# Patient Record
Sex: MALE | Race: WHITE | NOT HISPANIC OR LATINO | Employment: FULL TIME | URBAN - METROPOLITAN AREA
[De-identification: names, ages, dates, MRNs, and addresses within clinical notes are randomized per-mention and may not be internally consistent; named-entity substitution may affect disease eponyms.]

---

## 2017-01-23 ENCOUNTER — COMMUNICATION - HEALTHEAST (OUTPATIENT)
Dept: ENDOCRINOLOGY | Facility: CLINIC | Age: 37
End: 2017-01-23

## 2017-01-23 DIAGNOSIS — E11.9 DIABETES MELLITUS TYPE 2, UNCOMPLICATED (H): ICD-10-CM

## 2017-02-01 ENCOUNTER — AMBULATORY - HEALTHEAST (OUTPATIENT)
Dept: LAB | Facility: CLINIC | Age: 37
End: 2017-02-01

## 2017-02-01 DIAGNOSIS — E11.9 DIABETES MELLITUS TYPE 2, UNCOMPLICATED (H): ICD-10-CM

## 2017-02-01 LAB
CHOLEST SERPL-MCNC: 128 MG/DL
CREAT SERPL-MCNC: 0.7 MG/DL (ref 0.7–1.3)
FASTING STATUS PATIENT QL REPORTED: NORMAL
GFR SERPL CREATININE-BSD FRML MDRD: >60 ML/MIN/1.73M2
HBA1C MFR BLD: 8.4 % (ref 3.5–6)
HDLC SERPL-MCNC: 46 MG/DL
LDLC SERPL CALC-MCNC: 66 MG/DL
TRIGL SERPL-MCNC: 79 MG/DL

## 2017-02-09 ENCOUNTER — OFFICE VISIT - HEALTHEAST (OUTPATIENT)
Dept: ENDOCRINOLOGY | Facility: CLINIC | Age: 37
End: 2017-02-09

## 2017-02-09 DIAGNOSIS — E11.9 DIABETES MELLITUS TYPE 2, UNCOMPLICATED (H): ICD-10-CM

## 2017-02-10 ENCOUNTER — COMMUNICATION - HEALTHEAST (OUTPATIENT)
Dept: ENDOCRINOLOGY | Facility: CLINIC | Age: 37
End: 2017-02-10

## 2017-04-01 ENCOUNTER — OFFICE VISIT - HEALTHEAST (OUTPATIENT)
Dept: FAMILY MEDICINE | Facility: CLINIC | Age: 37
End: 2017-04-01

## 2017-04-01 DIAGNOSIS — H10.10 ALLERGIC CONJUNCTIVITIS: ICD-10-CM

## 2017-05-01 ENCOUNTER — COMMUNICATION - HEALTHEAST (OUTPATIENT)
Dept: ENDOCRINOLOGY | Facility: CLINIC | Age: 37
End: 2017-05-01

## 2017-05-01 DIAGNOSIS — E11.9 TYPE 2 DIABETES MELLITUS WITHOUT COMPLICATION (H): ICD-10-CM

## 2017-05-13 ENCOUNTER — OFFICE VISIT - HEALTHEAST (OUTPATIENT)
Dept: FAMILY MEDICINE | Facility: CLINIC | Age: 37
End: 2017-05-13

## 2017-05-13 ENCOUNTER — COMMUNICATION - HEALTHEAST (OUTPATIENT)
Dept: SCHEDULING | Facility: CLINIC | Age: 37
End: 2017-05-13

## 2017-05-13 ENCOUNTER — COMMUNICATION - HEALTHEAST (OUTPATIENT)
Dept: ENDOCRINOLOGY | Facility: CLINIC | Age: 37
End: 2017-05-13

## 2017-05-13 DIAGNOSIS — E11.9 DIABETES MELLITUS TYPE 2, UNCOMPLICATED (H): ICD-10-CM

## 2017-05-13 ASSESSMENT — MIFFLIN-ST. JEOR: SCORE: 2027.1

## 2017-05-31 ENCOUNTER — COMMUNICATION - HEALTHEAST (OUTPATIENT)
Dept: ENDOCRINOLOGY | Facility: CLINIC | Age: 37
End: 2017-05-31

## 2017-05-31 DIAGNOSIS — E11.9 DIABETES MELLITUS TYPE 2, UNCOMPLICATED (H): ICD-10-CM

## 2017-07-31 ENCOUNTER — COMMUNICATION - HEALTHEAST (OUTPATIENT)
Dept: ENDOCRINOLOGY | Facility: CLINIC | Age: 37
End: 2017-07-31

## 2017-07-31 DIAGNOSIS — E11.9 DIABETES MELLITUS TYPE 2, UNCOMPLICATED (H): ICD-10-CM

## 2017-08-01 ENCOUNTER — COMMUNICATION - HEALTHEAST (OUTPATIENT)
Dept: ENDOCRINOLOGY | Facility: CLINIC | Age: 37
End: 2017-08-01

## 2017-08-01 ENCOUNTER — AMBULATORY - HEALTHEAST (OUTPATIENT)
Dept: ENDOCRINOLOGY | Facility: CLINIC | Age: 37
End: 2017-08-01

## 2017-08-01 ENCOUNTER — AMBULATORY - HEALTHEAST (OUTPATIENT)
Dept: LAB | Facility: CLINIC | Age: 37
End: 2017-08-01

## 2017-08-01 DIAGNOSIS — E11.9 DIABETES MELLITUS TYPE 2, UNCOMPLICATED (H): ICD-10-CM

## 2017-08-01 LAB
CREAT SERPL-MCNC: 0.8 MG/DL (ref 0.7–1.3)
GFR SERPL CREATININE-BSD FRML MDRD: >60 ML/MIN/1.73M2
HBA1C MFR BLD: 8.1 % (ref 3.5–6)

## 2017-08-08 ENCOUNTER — OFFICE VISIT - HEALTHEAST (OUTPATIENT)
Dept: ENDOCRINOLOGY | Facility: CLINIC | Age: 37
End: 2017-08-08

## 2017-08-08 DIAGNOSIS — E11.9 DIABETES MELLITUS TYPE 2, UNCOMPLICATED (H): ICD-10-CM

## 2017-08-08 DIAGNOSIS — E11.9 TYPE 2 DIABETES MELLITUS WITHOUT COMPLICATION (H): ICD-10-CM

## 2017-08-09 ENCOUNTER — COMMUNICATION - HEALTHEAST (OUTPATIENT)
Dept: ENDOCRINOLOGY | Facility: CLINIC | Age: 37
End: 2017-08-09

## 2017-08-09 DIAGNOSIS — E11.9 DIABETES MELLITUS TYPE 2, UNCOMPLICATED (H): ICD-10-CM

## 2017-08-14 ENCOUNTER — COMMUNICATION - HEALTHEAST (OUTPATIENT)
Dept: ENDOCRINOLOGY | Facility: CLINIC | Age: 37
End: 2017-08-14

## 2017-08-14 DIAGNOSIS — E11.9 DIABETES (H): ICD-10-CM

## 2017-08-15 ENCOUNTER — COMMUNICATION - HEALTHEAST (OUTPATIENT)
Dept: ENDOCRINOLOGY | Facility: CLINIC | Age: 37
End: 2017-08-15

## 2017-09-19 ENCOUNTER — COMMUNICATION - HEALTHEAST (OUTPATIENT)
Dept: ENDOCRINOLOGY | Facility: CLINIC | Age: 37
End: 2017-09-19

## 2017-09-21 ENCOUNTER — AMBULATORY - HEALTHEAST (OUTPATIENT)
Dept: ENDOCRINOLOGY | Facility: CLINIC | Age: 37
End: 2017-09-21

## 2017-09-21 DIAGNOSIS — E11.9 DIABETES MELLITUS TYPE 2, UNCOMPLICATED (H): ICD-10-CM

## 2017-09-25 ENCOUNTER — COMMUNICATION - HEALTHEAST (OUTPATIENT)
Dept: ADMINISTRATIVE | Facility: CLINIC | Age: 37
End: 2017-09-25

## 2017-09-25 DIAGNOSIS — E11.9 DIABETES MELLITUS TYPE 2, UNCOMPLICATED (H): ICD-10-CM

## 2017-09-26 ENCOUNTER — COMMUNICATION - HEALTHEAST (OUTPATIENT)
Dept: ENDOCRINOLOGY | Facility: CLINIC | Age: 37
End: 2017-09-26

## 2017-09-26 DIAGNOSIS — N52.9 ERECTILE DYSFUNCTION: ICD-10-CM

## 2017-10-23 ENCOUNTER — AMBULATORY - HEALTHEAST (OUTPATIENT)
Dept: NURSING | Facility: CLINIC | Age: 37
End: 2017-10-23

## 2017-10-25 ENCOUNTER — COMMUNICATION - HEALTHEAST (OUTPATIENT)
Dept: ENDOCRINOLOGY | Facility: CLINIC | Age: 37
End: 2017-10-25

## 2017-10-26 ENCOUNTER — COMMUNICATION - HEALTHEAST (OUTPATIENT)
Dept: ENDOCRINOLOGY | Facility: CLINIC | Age: 37
End: 2017-10-26

## 2017-10-26 DIAGNOSIS — E11.9 DIABETES MELLITUS TYPE 2, UNCOMPLICATED (H): ICD-10-CM

## 2017-11-02 ENCOUNTER — OFFICE VISIT - HEALTHEAST (OUTPATIENT)
Dept: INTERNAL MEDICINE | Facility: CLINIC | Age: 37
End: 2017-11-02

## 2017-11-02 DIAGNOSIS — E11.9 DIABETES MELLITUS (H): ICD-10-CM

## 2017-11-02 DIAGNOSIS — N52.9 ED (ERECTILE DYSFUNCTION): ICD-10-CM

## 2017-11-02 DIAGNOSIS — Z11.3 SCREEN FOR STD (SEXUALLY TRANSMITTED DISEASE): ICD-10-CM

## 2017-11-02 DIAGNOSIS — Z00.00 HEALTH MAINTENANCE EXAMINATION: ICD-10-CM

## 2017-11-02 LAB
HBA1C MFR BLD: 7.5 % (ref 3.5–6)
HIV 1+2 AB+HIV1 P24 AG SERPL QL IA: NEGATIVE

## 2017-11-02 ASSESSMENT — MIFFLIN-ST. JEOR: SCORE: 2029.59

## 2017-11-03 LAB — SYPHILIS RPR SCREEN - HISTORICAL: NORMAL

## 2017-11-29 ENCOUNTER — COMMUNICATION - HEALTHEAST (OUTPATIENT)
Dept: ENDOCRINOLOGY | Facility: CLINIC | Age: 37
End: 2017-11-29

## 2017-11-29 DIAGNOSIS — E11.9 DIABETES MELLITUS TYPE 2, UNCOMPLICATED (H): ICD-10-CM

## 2017-12-18 ENCOUNTER — COMMUNICATION - HEALTHEAST (OUTPATIENT)
Dept: ENDOCRINOLOGY | Facility: CLINIC | Age: 37
End: 2017-12-18

## 2017-12-18 DIAGNOSIS — E11.9 DIABETES MELLITUS TYPE 2, UNCOMPLICATED (H): ICD-10-CM

## 2018-01-22 ENCOUNTER — COMMUNICATION - HEALTHEAST (OUTPATIENT)
Dept: ENDOCRINOLOGY | Facility: CLINIC | Age: 38
End: 2018-01-22

## 2018-01-22 DIAGNOSIS — E11.9 DIABETES MELLITUS TYPE 2, UNCOMPLICATED (H): ICD-10-CM

## 2018-01-23 ENCOUNTER — COMMUNICATION - HEALTHEAST (OUTPATIENT)
Dept: LAB | Facility: CLINIC | Age: 38
End: 2018-01-23

## 2018-01-25 ENCOUNTER — AMBULATORY - HEALTHEAST (OUTPATIENT)
Dept: ENDOCRINOLOGY | Facility: CLINIC | Age: 38
End: 2018-01-25

## 2018-01-25 DIAGNOSIS — E11.9 DIABETES MELLITUS TYPE 2, UNCOMPLICATED (H): ICD-10-CM

## 2018-02-05 ENCOUNTER — AMBULATORY - HEALTHEAST (OUTPATIENT)
Dept: LAB | Facility: CLINIC | Age: 38
End: 2018-02-05

## 2018-02-05 DIAGNOSIS — E11.9 DIABETES MELLITUS TYPE 2, UNCOMPLICATED (H): ICD-10-CM

## 2018-02-05 LAB
CHOLEST SERPL-MCNC: 124 MG/DL
CREAT SERPL-MCNC: 0.74 MG/DL (ref 0.7–1.3)
FASTING STATUS PATIENT QL REPORTED: YES
GFR SERPL CREATININE-BSD FRML MDRD: >60 ML/MIN/1.73M2
HBA1C MFR BLD: 7.3 % (ref 3.5–6)
HDLC SERPL-MCNC: 47 MG/DL
LDLC SERPL CALC-MCNC: 58 MG/DL
POTASSIUM BLD-SCNC: 4.5 MMOL/L (ref 3.5–5)
TRIGL SERPL-MCNC: 95 MG/DL

## 2018-02-12 ENCOUNTER — OFFICE VISIT - HEALTHEAST (OUTPATIENT)
Dept: ENDOCRINOLOGY | Facility: CLINIC | Age: 38
End: 2018-02-12

## 2018-02-12 DIAGNOSIS — E11.9 DIABETES MELLITUS TYPE 2, UNCOMPLICATED (H): ICD-10-CM

## 2018-02-12 ASSESSMENT — MIFFLIN-ST. JEOR: SCORE: 2070.87

## 2018-02-28 ENCOUNTER — COMMUNICATION - HEALTHEAST (OUTPATIENT)
Dept: ENDOCRINOLOGY | Facility: CLINIC | Age: 38
End: 2018-02-28

## 2018-02-28 DIAGNOSIS — E11.9 DIABETES (H): ICD-10-CM

## 2018-03-02 ENCOUNTER — RECORDS - HEALTHEAST (OUTPATIENT)
Dept: ADMINISTRATIVE | Facility: OTHER | Age: 38
End: 2018-03-02

## 2018-03-19 ENCOUNTER — TRANSFERRED RECORDS (OUTPATIENT)
Dept: HEALTH INFORMATION MANAGEMENT | Facility: CLINIC | Age: 38
End: 2018-03-19

## 2018-03-19 ENCOUNTER — RECORDS - HEALTHEAST (OUTPATIENT)
Dept: ADMINISTRATIVE | Facility: OTHER | Age: 38
End: 2018-03-19

## 2018-03-22 ENCOUNTER — TELEPHONE (OUTPATIENT)
Dept: UROLOGY | Facility: CLINIC | Age: 38
End: 2018-03-22

## 2018-03-22 DIAGNOSIS — N35.919 URETHRAL STRICTURE: Primary | ICD-10-CM

## 2018-03-22 NOTE — TELEPHONE ENCOUNTER
Patient is being referred to Dr Awad from Dr Richmond at [facility]. He is being seen for a urethral stricture (has had this problem for the last 8-10 years). Last dilation was about 1 year ago. No previous urethroplasty surgeries.     He recently have a VCUG at an OSH. Will get records and see if he needs a RUG.    His appt was originally scheduled for June.  It was moved up to the end of April.     We also discussed what would happen if he was unable to urinate. He does not self dilate.     Will review images and records to see if he needs a RUG prior to his appointment and let him know.    Patient verbalized understanding. No further questions.       Radha Becerra, RN, BSN

## 2018-03-26 ENCOUNTER — PRE VISIT (OUTPATIENT)
Dept: UROLOGY | Facility: CLINIC | Age: 38
End: 2018-03-26

## 2018-03-26 NOTE — TELEPHONE ENCOUNTER
RECORDS RECEIVED FROM: [facility] IN PROCESS   DATE RECEIVED: IN PROCESS   NOTES STATUS DETAILS   OFFICE NOTE from referring provider {Records Status:[id number]    OFFICE NOTES from other specialists {Records Status:[id number]    DISCHARGE SUMMARY from hospital {Records Status:[id number]    DISCHARGE REPORT from the ER {Records Status:[id number]    OPERATIVE REPORT {Records Status:[id number]    MEDICATION LIST {Records Status:802004    LABS     URINALYSIS (UA) {Records Status:[id number]    URINE CYTOLOGY {Records Status:558607    DIAGNOSIS SPECIFIC LABS     RUG (IMAGES & REPORT) and VCUG (IMAGES & REPORT)  REQUEST SENT,.. CDK {Records Status:[id number] IN PROCESS

## 2018-04-03 ENCOUNTER — COMMUNICATION - HEALTHEAST (OUTPATIENT)
Dept: ENDOCRINOLOGY | Facility: CLINIC | Age: 38
End: 2018-04-03

## 2018-04-03 DIAGNOSIS — E11.9 DIABETES MELLITUS TYPE 2, UNCOMPLICATED (H): ICD-10-CM

## 2018-04-05 ENCOUNTER — COMMUNICATION - HEALTHEAST (OUTPATIENT)
Dept: ENDOCRINOLOGY | Facility: CLINIC | Age: 38
End: 2018-04-05

## 2018-04-05 DIAGNOSIS — E11.9 DIABETES MELLITUS TYPE 2, UNCOMPLICATED (H): ICD-10-CM

## 2018-04-10 ENCOUNTER — COMMUNICATION - HEALTHEAST (OUTPATIENT)
Dept: ENDOCRINOLOGY | Facility: CLINIC | Age: 38
End: 2018-04-10

## 2018-04-13 ENCOUNTER — TELEPHONE (OUTPATIENT)
Dept: UROLOGY | Facility: CLINIC | Age: 38
End: 2018-04-13

## 2018-04-16 ENCOUNTER — OFFICE VISIT (OUTPATIENT)
Dept: UROLOGY | Facility: CLINIC | Age: 38
End: 2018-04-16
Payer: COMMERCIAL

## 2018-04-16 ENCOUNTER — HOSPITAL ENCOUNTER (OUTPATIENT)
Dept: GENERAL RADIOLOGY | Facility: CLINIC | Age: 38
End: 2018-04-16
Attending: UROLOGY
Payer: COMMERCIAL

## 2018-04-16 ENCOUNTER — RECORDS - HEALTHEAST (OUTPATIENT)
Dept: ADMINISTRATIVE | Facility: OTHER | Age: 38
End: 2018-04-16

## 2018-04-16 ENCOUNTER — ALLIED HEALTH/NURSE VISIT (OUTPATIENT)
Dept: UROLOGY | Facility: CLINIC | Age: 38
End: 2018-04-16
Payer: COMMERCIAL

## 2018-04-16 VITALS
HEIGHT: 70 IN | BODY MASS INDEX: 37.37 KG/M2 | DIASTOLIC BLOOD PRESSURE: 72 MMHG | HEART RATE: 66 BPM | SYSTOLIC BLOOD PRESSURE: 113 MMHG | WEIGHT: 261 LBS

## 2018-04-16 DIAGNOSIS — N35.912 BULBOUS URETHRAL STRICTURE: Primary | ICD-10-CM

## 2018-04-16 DIAGNOSIS — N35.919 URETHRAL STRICTURE: ICD-10-CM

## 2018-04-16 PROCEDURE — 25000125 ZZHC RX 250: Performed by: STUDENT IN AN ORGANIZED HEALTH CARE EDUCATION/TRAINING PROGRAM

## 2018-04-16 PROCEDURE — 51610 INJECTION FOR BLADDER X-RAY: CPT

## 2018-04-16 RX ORDER — LISINOPRIL 10 MG/1
10 TABLET ORAL
COMMUNITY
Start: 2014-03-27 | End: 2019-09-23

## 2018-04-16 RX ORDER — LORATADINE 10 MG/1
10 TABLET ORAL
COMMUNITY
Start: 2013-09-11

## 2018-04-16 RX ORDER — DAPAGLIFLOZIN 10 MG/1
10 TABLET, FILM COATED ORAL
COMMUNITY
Start: 2017-02-10 | End: 2018-05-17

## 2018-04-16 RX ORDER — SIMVASTATIN 20 MG
20 TABLET ORAL
COMMUNITY
Start: 2017-11-29 | End: 2019-09-23

## 2018-04-16 RX ORDER — SIMVASTATIN 40 MG
40 TABLET ORAL
COMMUNITY
Start: 2014-01-08 | End: 2018-04-30

## 2018-04-16 RX ORDER — CIPROFLOXACIN 2 MG/ML
400 INJECTION, SOLUTION INTRAVENOUS EVERY 12 HOURS
Status: CANCELLED | OUTPATIENT
Start: 2018-04-16

## 2018-04-16 RX ORDER — TADALAFIL 10 MG/1
10 TABLET ORAL
COMMUNITY
Start: 2017-09-26 | End: 2023-04-26

## 2018-04-16 RX ORDER — METFORMIN HCL 500 MG
2000 TABLET, EXTENDED RELEASE 24 HR ORAL
COMMUNITY
Start: 2014-01-08 | End: 2019-09-23

## 2018-04-16 RX ADMIN — LIDOCAINE HYDROCHLORIDE 10 TUBE: 20 JELLY TOPICAL at 09:15

## 2018-04-16 ASSESSMENT — ENCOUNTER SYMPTOMS
BLOATING: 0
DIFFICULTY URINATING: 1
VOMITING: 1
ABDOMINAL PAIN: 1
NAUSEA: 1
CONSTIPATION: 0
DIARRHEA: 1
FLANK PAIN: 0
HEMATURIA: 0
JAUNDICE: 0
DYSURIA: 1
HEARTBURN: 0
BLOOD IN STOOL: 0
RECTAL PAIN: 0
BOWEL INCONTINENCE: 0

## 2018-04-16 ASSESSMENT — PAIN SCALES - GENERAL: PAINLEVEL: NO PAIN (0)

## 2018-04-16 NOTE — MR AVS SNAPSHOT
After Visit Summary   2018    Clarence Boyle    MRN: [id number]           Patient Information     Date Of Birth          1980        Visit Information        Provider Department      2018 12:30 PM Nurse, [facility] and [facility]        Today's Diagnoses     Bulbous urethral stricture    -  1       Follow-ups after your visit        Who to contact     Please call your clinic at [phone] to:    Ask questions about your health    Make or cancel appointments    Discuss your medicines    Learn about your test results    Speak to your doctor            Additional Information About Your Visit        MyChart Information     Wesabe is an electronic gateway that provides easy, online access to your medical records. With Wesabe, you can request a clinic appointment, read your test results, renew a prescription or communicate with your care team.     To sign up for MD2Ut visit the website at [email or web address]   You will be asked to enter the access code listed below, as well as some personal information. Please follow the directions to create your username and password.     Your access code is: [email or web address]  Expires: 2018  6:31 AM     Your access code will  in 90 days. If you need help or a new code, please contact your [facility] or call [phone] for assistance.        Care EveryWhere ID     This is your Care EveryWhere ID. This could be used by other organizations to access your [facility] medical records  [id number]         Blood Pressure from Last 3 Encounters:   18 113/72    Weight from Last 3 Encounters:   18 118.4 kg (261 lb)              Today, you had the following     No orders found for display       Primary Care Provider Office Phone # Fax #    Marcos Jo [phone]       [facility] [address] [place] [address]         Equal Access to Services     [facility]: Hadii dominik florentino aaron Somerly, waaxda luqadaha, qaybta kaalmada mayatwilasteven, abbe leachmelodytanya dave. So [facility] [phone].    ATENCIÓN: Si habla español, tiene a he disposición servicios gratuitos de asistencia lingüística. Llame al [phone].    We comply with applicable federal civil rights laws and [place] laws. We do not discriminate on the basis of race, color, national origin, age, disability, sex, sexual orientation, or gender identity.            Thank you!     Thank you for choosing [facility]OLOG[facility] AND [facility] FOR PROSTATE AND UROLOGIC CANCERS  for your care. Our goal is always to provide you with excellent care. Hearing back from our patients is one way we can continue to improve our services. Please take a few minutes to complete the written survey that you may receive in the mail after your visit with us. Thank you!             Your Updated Medication List - Protect others around you: Learn how to safely use, store and throw away your medicines at www.disposemymeds.org.          This list is accurate as of 4/16/18  2:35 PM.  Always use your most recent med list.                   Brand Name Dispense Instructions for use Diagnosis    ASPIRIN 81 PO      Take 81 mg by mouth        B-D U/F 31G X 8 MM   Generic drug:  insulin pen needle      FOR ADMINISTERING INSULIN (3 HUMALOG AND 1 LANTUS)        MARYURI CONTOUR test strip   Generic drug:  blood glucose monitoring      Dispense test strips covered by the patient insurance. Test 3 times per day.        dapagliflozin 10 MG Tabs tablet    FARXIGA     Take 10 mg by mouth        insulin degludec 100 UNIT/ML pen    TRESIBA     Inject 65 Units Subcutaneous        lisinopril 10 MG tablet    PRINIVIL/ZESTRIL     Take 10 mg by mouth        loratadine 10 MG tablet    CLARITIN     Take 10 mg by mouth        metFORMIN 500 MG 24 hr tablet    GLUCOPHAGE-XR     Take 1,000 mg by mouth        MULTI COMPLETE PO            NovoLOG FLEXPEN 100 UNIT/ML injection   Generic drug:  insulin aspart           * simvastatin 40 MG tablet    ZOCOR     Take 40 mg by mouth        * simvastatin 20 MG tablet    ZOCOR     Take 20 mg by mouth        tadalafil 10 MG tablet    CIALIS     Take 10 mg by mouth        * Notice:  This list has 2 medication(s) that are the same as other medications prescribed for you. Read the directions carefully, and ask your doctor or other care provider to review them with you.

## 2018-04-16 NOTE — NURSING NOTE
"Chief Complaint   Patient presents with     Consult     Urethral stricture consult       Blood pressure 113/72, pulse 66, height 1.778 m (5' 10\"), weight 118.4 kg (261 lb). Body mass index is 37.45 kg/(m^2).    There is no problem list on file for this patient.      Allergies   Allergen Reactions     Penicillins        Current Outpatient Prescriptions   Medication Sig Dispense Refill     insulin pen needle (B-D U/F) 31G X 8 MM FOR ADMINISTERING INSULIN (3 HUMALOG AND 1 LANTUS)       blood glucose monitoring (Lipocalyx CONTOUR) test strip Dispense test strips covered by the patient insurance. Test 3 times per day.       lisinopril (PRINIVIL/ZESTRIL) 10 MG tablet Take 10 mg by mouth       loratadine (CLARITIN) 10 MG tablet Take 10 mg by mouth       metFORMIN (GLUCOPHAGE-XR) 500 MG 24 hr tablet Take 1,000 mg by mouth       insulin aspart (NOVOLOG FLEXPEN) 100 UNIT/ML injection        simvastatin (ZOCOR) 40 MG tablet Take 40 mg by mouth       dapagliflozin (FARXIGA) 10 MG TABS tablet Take 10 mg by mouth       insulin degludec (TRESIBA) 100 UNIT/ML pen Inject 65 Units Subcutaneous       simvastatin (ZOCOR) 20 MG tablet Take 20 mg by mouth       tadalafil (CIALIS) 10 MG tablet Take 10 mg by mouth       ASPIRIN 81 PO Take 81 mg by mouth       Multiple Vitamins-Minerals (MULTI COMPLETE PO)          Social History   Substance Use Topics     Smoking status: Never Smoker     Smokeless tobacco: Never Used     Alcohol use Not on file       LISA Rivas  4/16/2018  10:06 AM       "

## 2018-04-16 NOTE — PROGRESS NOTES
Pre Op Teaching Flowsheet       Pre and Post op Patient Education  Relevant Diagnosis:  Bulbous urethral stricture  Teaching Topic:  Pre and post op teaching for Posterior urethroplasty with single buccal mucosa graft  Person Involved in teaching:  Clarence Boyle      Motivation Level:  Asks Questions: Yes  Eager to Learn:  Yes  Cooperative: Yes  Receptive (willing/able to accept information):  Yes  Patient demonstrates understanding of the following:  Date and time of surgery:  TBD  Location of surgery: TBD  History and Physical and any other testing necessary prior to surgery: Yes  Required time line for completion of History and Physical and any pre-op testing: Yes    NPO Guidelines: NPO per Anesthesia Guidelines    Patient demonstrates understanding of the following:  Patient understands the need for a responsible adult to drive them home and someone to stay with them for the first 24 hours post-operatively: YES Wife  Pre-op bowel prep: No, not needed  Pre-op showering/scrub information with Hibiclens Soap: Yes  Medications to take the day of surgery:  Per PCP  Blood thinner medications discussed and when to stop (if applicable):  Yes  Diabetes medication management (if applicable):  Patient to discuss with Primary Care Provider  Discussed pain control after surgery: pain scale, pain medications and pain management techniques  Infection Prevention: Patient demonstrates understanding of the following:  Patient instructed on hand hygiene:  Yes  Surgical procedure site care taught: Yes  Signs and symptoms of infection taught:  Yes  Wound care will be taught at the time of discharge.  Central venous catheter care will be taught at the time of discharge (if applicable).    Post-op follow-up:  Discussed how to contact the hospital, nurse, and clinic scheduling staff if necessary.    Instructional materials used/given/mailed:  [facility] Booklet, post op teaching sheet, Map, Soap, and arrival/location  information.    Surgical instructions given to patient in clinic: Yes.    Instructional Materials given:  Before your surgery packet , Medications to avoid before surgery , Showering or Bathing instructions before surgery  and What to expect after surgery    Post-op appointment/testing scheduled per MD orders: TBD    Total time with patient: 10 minutes    Megan Villareal RN  Urology Care Coordinator

## 2018-04-16 NOTE — LETTER
4/16/2018       RE: Clarence Boyle  [address]  [facility] [address]     Dear Colleague,    Thank you for referring your patient, Clarence Boyle, to the [facility] UROLOGY AND [facility] PROSTATE AND UROLOGIC CANCERS at [facility]. Please see a copy of my visit note below.    UROLOGY CONSULT HISTORY & PHYSICAL    Name: Clarence Boyle    MRN: [id number]   YOB: 1980         CHIEF COMPLAINT:  Urethral stricture    HISTORY OF PRESENT ILLNESS:  Mr. Boyle is a 37 year old-year-old man seen in consultation from Dr. Richmond for urethral stricture. Symptoms include slow stream and sense of incomplete emptying. He has had prior treatments for his urethral stricture. He reports at least 10-12 urethral dilations since his early 20s. Each helped him for roughly 3 months. He was seen by Dr. Richmond at [facility] and was offered urethroplasty with buccal graft, but they were not able to have surgery with him due to insurance issues. He is now referred here for further discussion of urethroplasty.     He does not have a history of self dilation. He currently does not perform self dilation. He does not currently have an indwelling catheter.    Etiology:  He denies a history of sexually transmitted diseases. He denies a history of straddle injury. He denies a history of pelvic fracture. He confirms a history of urethral catheterization or instrumentation as per HPI.    REVIEW OF QUESTIONNAIRES:  Questionnaires reviewed. See flowsheet for details.    No past medical history on file.    No past surgical history on file.    Current Outpatient Prescriptions   Medication     insulin pen needle (B-D U/F) 31G X 8 MM     blood glucose monitoring (MARYURI CONTOUR) test strip     lisinopril (PRINIVIL/ZESTRIL) 10 MG tablet     loratadine (CLARITIN) 10 MG tablet     metFORMIN (GLUCOPHAGE-XR) 500 MG 24 hr tablet     insulin aspart (NOVOLOG FLEXPEN) 100 UNIT/ML injection     simvastatin (ZOCOR) 40 MG tablet      "dapagliflozin (FARXIGA) 10 MG TABS tablet     insulin degludec (TRESIBA) 100 UNIT/ML pen     simvastatin (ZOCOR) 20 MG tablet     tadalafil (CIALIS) 10 MG tablet     ASPIRIN 81 PO     Multiple Vitamins-Minerals (MULTI COMPLETE PO)     No current facility-administered medications for this visit.        Allergies as of 04/16/2018 - never reviewed   Allergen Reaction Noted     Penicillins         No family history on file.    Social History     Social History     Marital status:      Spouse name: N/A     Number of children: N/A     Years of education: N/A     Occupational History     Not on file.     Social History Main Topics     Smoking status: Never Smoker     Smokeless tobacco: Never Used     Alcohol use Not on file     Drug use: Not on file     Sexual activity: Not on file     Other Topics Concern     Not on file     Social History Narrative     No narrative on file       REVIEW OF SYSTEMS:   See HPI for pertinent details.  Remainder of 10-point ROS negative.    PHYSICAL EXAM:  General: Well-dressed, well-nourished man in no acute distress  Vitals: /72  Pulse 66  Ht 1.778 m (5' 10\")  Wt 118.4 kg (261 lb)  BMI 37.45 kg/m2 Estimated body mass index is 37.45 kg/(m^2) as calculated from the following:    Height as of this encounter: 1.778 m (5' 10\").    Weight as of this encounter: 118.4 kg (261 lb).  Eyes: Anicteric, EOMI  Lymph: No cervical, supraclavicular lymphadenopathy  Lungs: No respiratory distress  Heart: Regular rate and rhythm  Abdomen: moderately obese, soft, nontender, without masses.  There are no surgical scars  Back: Flanks are nontender  : Circumcised. Meatal stenosis is absent, penile plaques are absent. Skin lesions are absent.  There is not firmness along the course of the entire urethra urethra.  Testes are 10 mL bilaterally without masses. Rectal examination was not performed  Neurologic: Grossly nonfocal    REVIEW OF IMAGING STUDIES:  Retrograde urethrogram was performed " earlier and the images were independently interpreted by me and are reviewed with the patient.  These demonstrate a 3 cm  distal bulbar urethral stricture that is not obliterative.  8 Fr feeding tube was unable to be advanced for VCUG.     REVIEW OF OFFICE STUDIES:  Urinary Flow Rate  Peak Flow: 9.9 mL/s  Average Flow: 6.9 mL/s  Voided (mL): 259 mL  Residual Volume by Ultrasound: 45 mL    REVIEW OF OUTSIDE RECORDS:  I reviewed outside records for 10 minutes.  Findings include:  His first intervention for urethral stricture was a dilation in ~2000 by Dr. Barton.  Subsequent urethral stricture treatments include 10-12 additional dilations by Dr. Barton over the past 15 years.    ASSESSMENT/PLAN:  A 37 year old-year-old gentleman with urethral stricture refractory to minimally invasive management.  He has been managed by prior urologist(s) and is referred to our center for advanced treatment options.    Risks, benefits, and alternatives of repeat urethrotomy versus urethroplasty were described.  He wishes to proceed with urethroplasty.  He understands the risks to include but not be limited to bleeding, infection, penile pain or numbness, scrotal pain or numbness, lower extremity neuropathy, change in erectile function, change in ejaculatory function, and need for additional procedures. He understands the success rate of urethroplasty to be about 95% for anastomotic and 85% for augmentation procedures.      He would like to proceed with urethroplasty with buccal mucosal graft. He is amenable to participation in the dorsal vs ventral onlay study and signed the consent.     He is also interested in vasectomy and would like to have this done at the time of his urethroplasty if possible. We discussed that vasectomy is considered a permanent procedure. We discussed that he should consider himself fertile until azoospermia is confirmed on semen analysis and that there is a 1 in 5000 failure rate even after two negative semen  analyses.     Additional Risk Factors in this case/sugery: none    Patient was seen and examined with Dr. Awad    --    Vinicius Alicea MD  Urology Resident  11:56 AM, 4/16/2018    =======================================================  As the attending surgeon I, Herb Awad, interviewed and examined the patient. The plan was developed between me and the patient. My findings and plan are as stated by the resident.      Again, thank you for allowing me to participate in the care of your patient.      Sincerely,    Herb Awad MD

## 2018-04-16 NOTE — PROGRESS NOTES
UROLOGY CONSULT HISTORY & PHYSICAL    Name: Clarence Boyle    MRN: [id number]   YOB: 1980         CHIEF COMPLAINT:  Urethral stricture    HISTORY OF PRESENT ILLNESS:  Mr. Boyle is a 37 year old-year-old man seen in consultation from Dr. Richmond for urethral stricture. Symptoms include slow stream and sense of incomplete emptying. He has had prior treatments for his urethral stricture. He reports at least 10-12 urethral dilations since his early 20s. Each helped him for roughly 3 months. He was seen by Dr. Richmond at [facility] and was offered urethroplasty with buccal graft, but they were not able to have surgery with him due to insurance issues. He is now referred here for further discussion of urethroplasty.     He does not have a history of self dilation. He currently does not perform self dilation. He does not currently have an indwelling catheter.    Etiology:  He denies a history of sexually transmitted diseases. He denies a history of straddle injury. He denies a history of pelvic fracture. He confirms a history of urethral catheterization or instrumentation as per HPI.    REVIEW OF QUESTIONNAIRES:  Questionnaires reviewed. See flowsheet for details.    No past medical history on file.    No past surgical history on file.    Current Outpatient Prescriptions   Medication     insulin pen needle (B-D U/F) 31G X 8 MM     blood glucose monitoring (MARYURI CONTOUR) test strip     lisinopril (PRINIVIL/ZESTRIL) 10 MG tablet     loratadine (CLARITIN) 10 MG tablet     metFORMIN (GLUCOPHAGE-XR) 500 MG 24 hr tablet     insulin aspart (NOVOLOG FLEXPEN) 100 UNIT/ML injection     simvastatin (ZOCOR) 40 MG tablet     dapagliflozin (FARXIGA) 10 MG TABS tablet     insulin degludec (TRESIBA) 100 UNIT/ML pen     simvastatin (ZOCOR) 20 MG tablet     tadalafil (CIALIS) 10 MG tablet     ASPIRIN 81 PO     Multiple Vitamins-Minerals (MULTI COMPLETE PO)     No current facility-administered medications for this visit.   "      Allergies as of 04/16/2018 - never reviewed   Allergen Reaction Noted     Penicillins         No family history on file.    Social History     Social History     Marital status:      Spouse name: N/A     Number of children: N/A     Years of education: N/A     Occupational History     Not on file.     Social History Main Topics     Smoking status: Never Smoker     Smokeless tobacco: Never Used     Alcohol use Not on file     Drug use: Not on file     Sexual activity: Not on file     Other Topics Concern     Not on file     Social History Narrative     No narrative on file       REVIEW OF SYSTEMS:   See HPI for pertinent details.  Remainder of 10-point ROS negative.    PHYSICAL EXAM:  General: Well-dressed, well-nourished man in no acute distress  Vitals: /72  Pulse 66  Ht 1.778 m (5' 10\")  Wt 118.4 kg (261 lb)  BMI 37.45 kg/m2 Estimated body mass index is 37.45 kg/(m^2) as calculated from the following:    Height as of this encounter: 1.778 m (5' 10\").    Weight as of this encounter: 118.4 kg (261 lb).  Eyes: Anicteric, EOMI  Lymph: No cervical, supraclavicular lymphadenopathy  Lungs: No respiratory distress  Heart: Regular rate and rhythm  Abdomen: moderately obese, soft, nontender, without masses.  There are no surgical scars  Back: Flanks are nontender  : Circumcised. Meatal stenosis is absent, penile plaques are absent. Skin lesions are absent.  There is not firmness along the course of the entire urethra urethra.  Testes are 10 mL bilaterally without masses. Rectal examination was not performed  Neurologic: Grossly nonfocal    REVIEW OF IMAGING STUDIES:  Retrograde urethrogram was performed earlier and the images were independently interpreted by me and are reviewed with the patient.  These demonstrate a 3 cm  distal bulbar urethral stricture that is not obliterative.  8 Fr feeding tube was unable to be advanced for VCUG.     REVIEW OF OFFICE STUDIES:  Urinary Flow Rate  Peak Flow: 9.9 " mL/s  Average Flow: 6.9 mL/s  Voided (mL): 259 mL  Residual Volume by Ultrasound: 45 mL    REVIEW OF OUTSIDE RECORDS:  I reviewed outside records for 10 minutes.  Findings include:  His first intervention for urethral stricture was a dilation in ~2000 by Dr. Barton.  Subsequent urethral stricture treatments include 10-12 additional dilations by Dr. Barton over the past 15 years.    ASSESSMENT/PLAN:  A 37 year old-year-old gentleman with urethral stricture refractory to minimally invasive management.  He has been managed by prior urologist(s) and is referred to our center for advanced treatment options.    Risks, benefits, and alternatives of repeat urethrotomy versus urethroplasty were described.  He wishes to proceed with urethroplasty.  He understands the risks to include but not be limited to bleeding, infection, penile pain or numbness, scrotal pain or numbness, lower extremity neuropathy, change in erectile function, change in ejaculatory function, and need for additional procedures. He understands the success rate of urethroplasty to be about 95% for anastomotic and 85% for augmentation procedures.      He would like to proceed with urethroplasty with buccal mucosal graft. He is amenable to participation in the dorsal vs ventral onlay study and signed the consent.     He is also interested in vasectomy and would like to have this done at the time of his urethroplasty if possible. We discussed that vasectomy is considered a permanent procedure. We discussed that he should consider himself fertile until azoospermia is confirmed on semen analysis and that there is a 1 in 5000 failure rate even after two negative semen analyses.     Additional Risk Factors in this case/sugery: none    Patient was seen and examined with Dr. Awad    --    Vinicius Alicea MD  Urology Resident  11:56 AM, 4/16/2018    =======================================================  As the attending surgeon I, Herb Awad, interviewed and examined  the patient. The plan was developed between me and the patient. My findings and plan are as stated by the resident.    Herb Awad MD    Answers for HPI/ROS submitted by the patient on 4/16/2018   General Symptoms: No  Skin Symptoms: No  HENT Symptoms: No  EYE SYMPTOMS: No  HEART SYMPTOMS: No  LUNG SYMPTOMS: No  INTESTINAL SYMPTOMS: Yes  URINARY SYMPTOMS: Yes  REPRODUCTIVE SYMPTOMS: No  SKELETAL SYMPTOMS: No  BLOOD SYMPTOMS: No  NERVOUS SYSTEM SYMPTOMS: No  MENTAL HEALTH SYMPTOMS: No  Heart burn or indigestion: No  Nausea: Yes  Vomiting: Yes  Abdominal pain: Yes  Bloating: No  Constipation: No  Diarrhea: Yes  Blood in stool: No  Black stools: No  Rectal or Anal pain: No  Fecal incontinence: No  Yellowing of skin or eyes: No  Vomit with blood: No  Change in stools: No  Trouble holding urine or incontinence: No  Pain or burning: Yes  Trouble starting or stopping: Yes  Increased frequency of urination: Yes  Blood in urine: No  Decreased frequency of urination: No  Frequent nighttime urination: No  Flank pain: No  Difficulty emptying bladder: Yes

## 2018-04-16 NOTE — MR AVS SNAPSHOT
"              After Visit Summary   2018    Clarence Boyle    MRN: [id number]           Patient Information     Date Of Birth          1980        Visit Information        Provider Department      2018 10:30 AM Herb Awad MD [facility]        Today's Diagnoses     Bulbous urethral stricture    -  1       Follow-ups after your visit        Who to contact     Please call your clinic at [phone] to:    Ask questions about your health    Make or cancel appointments    Discuss your medicines    Learn about your test results    Speak to your doctor            Additional Information About Your Visit        MyChart Information     [email or web address] is an electronic gateway that provides easy, online access to your medical records. With [email or web address], you can request a clinic appointment, read your test results, renew a prescription or communicate with your care team.     To sign up for [email or web address] visit the website at [email or web address]   You will be asked to enter the access code listed below, as well as some personal information. Please follow the directions to create your username and password.     Your access code is: [email or web address]  Expires: 2018  6:31 AM     Your access code will  in 90 days. If you need help or a new code, please contact your [facility] or call [phone] for assistance.        Care EveryWhere ID     This is your Care EveryWhere ID. This could be used by other organizations to access your [facility] medical records  [id number]        Your Vitals Were     Pulse Height BMI (Body Mass Index)             66 1.778 m (5' 10\") 37.45 kg/m2          Blood Pressure from Last 3 Encounters:   18 113/72    Weight from Last 3 Encounters:   18 118.4 kg (261 lb)              We Performed the Following     COMPLEX UROFLOWMETRY     Maria L-Operative Worksheet  (Urology General)     POST-VOID RESIDUAL " BLADDER SCAN        Primary Care Provider Office Phone # Fax #    Marcos Jo [phone]       [place] [address]        Equal Access to Services     ABNER TERRY : Hadii dominik ku hadzio Soomaali, waaxda luqadaha, qaybta kaalmada adeegyada, abbe olivarezary dave. So [facility] [phone].    ATENCIÓN: Si habla español, tiene a he disposición servicios gratuitos de asistencia lingüística. Llame al [phone].    We comply with applicable federal civil rights laws and [place] laws. We do not discriminate on the basis of race, color, national origin, age, disability, sex, sexual orientation, or gender identity.            Thank you!     Thank you for choosing [facility] AND [facility]  for your care. Our goal is always to provide you with excellent care. Hearing back from our patients is one way we can continue to improve our services. Please take a few minutes to complete the written survey that you may receive in the mail after your visit with us. Thank you!             Your Updated Medication List - Protect others around you: Learn how to safely use, store and throw away your medicines at www.disposemymeds.org.          This list is accurate as of 4/16/18 12:58 PM.  Always use your most recent med list.                   Brand Name Dispense Instructions for use Diagnosis    ASPIRIN 81 PO      Take 81 mg by mouth        B-D U/F 31G X 8 MM   Generic drug:  insulin pen needle      FOR ADMINISTERING INSULIN (3 HUMALOG AND 1 LANTUS)        MARYURI CONTOUR test strip   Generic drug:  blood glucose monitoring      Dispense test strips covered by the patient insurance. Test 3 times per day.        dapagliflozin 10 MG Tabs tablet    FARXIGA     Take 10 mg by mouth        insulin degludec 100 UNIT/ML pen    TRESIBA     Inject 65 Units Subcutaneous        lisinopril 10 MG tablet    PRINIVIL/ZESTRIL     Take 10 mg  by mouth        loratadine 10 MG tablet    CLARITIN     Take 10 mg by mouth        metFORMIN 500 MG 24 hr tablet    GLUCOPHAGE-XR     Take 1,000 mg by mouth        MULTI COMPLETE PO           NovoLOG FLEXPEN 100 UNIT/ML injection   Generic drug:  insulin aspart           * simvastatin 40 MG tablet    ZOCOR     Take 40 mg by mouth        * simvastatin 20 MG tablet    ZOCOR     Take 20 mg by mouth        tadalafil 10 MG tablet    CIALIS     Take 10 mg by mouth        * Notice:  This list has 2 medication(s) that are the same as other medications prescribed for you. Read the directions carefully, and ask your doctor or other care provider to review them with you.

## 2018-04-17 ENCOUNTER — TELEPHONE (OUTPATIENT)
Dept: UROLOGY | Facility: CLINIC | Age: 38
End: 2018-04-17

## 2018-04-17 DIAGNOSIS — N35.919 URETHRAL STRICTURE: Primary | ICD-10-CM

## 2018-04-17 NOTE — TELEPHONE ENCOUNTER
Patient returned phone call to schedule surgery with Dr Awad. Surgery scheduled on 5/2/18 @ 8:25 am @ [facility] OR.  Surgery packet was given in clinic during surgery teaching on 4/16/18 with RN care coordinator Kerry

## 2018-04-18 ENCOUNTER — COMMUNICATION - HEALTHEAST (OUTPATIENT)
Dept: ENDOCRINOLOGY | Facility: CLINIC | Age: 38
End: 2018-04-18

## 2018-04-18 DIAGNOSIS — E11.9 DIABETES MELLITUS TYPE 2, UNCOMPLICATED (H): ICD-10-CM

## 2018-04-19 ENCOUNTER — COMMUNICATION - HEALTHEAST (OUTPATIENT)
Dept: ENDOCRINOLOGY | Facility: CLINIC | Age: 38
End: 2018-04-19

## 2018-04-24 PROBLEM — N35.919 URETHRAL STRICTURE: Status: ACTIVE | Noted: 2018-04-24

## 2018-04-27 ENCOUNTER — OFFICE VISIT - HEALTHEAST (OUTPATIENT)
Dept: INTERNAL MEDICINE | Facility: CLINIC | Age: 38
End: 2018-04-27

## 2018-04-27 ENCOUNTER — COMMUNICATION - HEALTHEAST (OUTPATIENT)
Dept: LAB | Facility: CLINIC | Age: 38
End: 2018-04-27

## 2018-04-27 ENCOUNTER — TRANSFERRED RECORDS (OUTPATIENT)
Dept: HEALTH INFORMATION MANAGEMENT | Facility: CLINIC | Age: 38
End: 2018-04-27

## 2018-04-27 DIAGNOSIS — I10 ESSENTIAL HYPERTENSION: ICD-10-CM

## 2018-04-27 DIAGNOSIS — E11.9 TYPE 2 DIABETES MELLITUS WITHOUT COMPLICATION, WITH LONG-TERM CURRENT USE OF INSULIN (H): ICD-10-CM

## 2018-04-27 DIAGNOSIS — Z01.818 PREOP EXAM FOR INTERNAL MEDICINE: ICD-10-CM

## 2018-04-27 DIAGNOSIS — Z79.4 TYPE 2 DIABETES MELLITUS WITHOUT COMPLICATION, WITH LONG-TERM CURRENT USE OF INSULIN (H): ICD-10-CM

## 2018-04-27 DIAGNOSIS — Z79.899 MEDICATION MANAGEMENT: ICD-10-CM

## 2018-04-27 LAB
ALBUMIN UR-MCNC: NEGATIVE MG/DL
ANION GAP SERPL CALCULATED.3IONS-SCNC: 10 MMOL/L (ref 5–18)
APPEARANCE UR: CLEAR
BACTERIA #/AREA URNS HPF: ABNORMAL HPF
BILIRUB UR QL STRIP: NEGATIVE
BUN SERPL-MCNC: 20 MG/DL (ref 8–22)
CALCIUM SERPL-MCNC: 9.4 MG/DL (ref 8.5–10.5)
CHLORIDE BLD-SCNC: 105 MMOL/L (ref 98–107)
CO2 SERPL-SCNC: 23 MMOL/L (ref 22–31)
COLOR UR AUTO: YELLOW
CREAT SERPL-MCNC: 0.73 MG/DL (ref 0.7–1.3)
GFR SERPL CREATININE-BSD FRML MDRD: >60 ML/MIN/1.73M2
GLUCOSE BLD-MCNC: 166 MG/DL (ref 70–125)
GLUCOSE UR STRIP-MCNC: ABNORMAL MG/DL
HBA1C MFR BLD: 7.5 % (ref 3.5–6)
HGB UR QL STRIP: NEGATIVE
KETONES UR STRIP-MCNC: ABNORMAL MG/DL
LEUKOCYTE ESTERASE UR QL STRIP: NEGATIVE
MUCOUS THREADS #/AREA URNS LPF: ABNORMAL LPF
NITRATE UR QL: NEGATIVE
PH UR STRIP: 6 [PH] (ref 4.5–8)
POTASSIUM BLD-SCNC: 4.6 MMOL/L (ref 3.5–5)
RBC #/AREA URNS AUTO: ABNORMAL HPF
SODIUM SERPL-SCNC: 138 MMOL/L (ref 136–145)
SP GR UR STRIP: 1.03 (ref 1–1.03)
SPERM #/AREA URNS HPF: PRESENT /[HPF]
SQUAMOUS #/AREA URNS AUTO: ABNORMAL LPF
UROBILINOGEN UR STRIP-ACNC: ABNORMAL
WBC #/AREA URNS AUTO: ABNORMAL HPF

## 2018-04-27 ASSESSMENT — MIFFLIN-ST. JEOR: SCORE: 2127.17

## 2018-04-28 LAB — BACTERIA SPEC CULT: NO GROWTH

## 2018-04-30 ENCOUNTER — COMMUNICATION - HEALTHEAST (OUTPATIENT)
Dept: INTERNAL MEDICINE | Facility: CLINIC | Age: 38
End: 2018-04-30

## 2018-05-01 ENCOUNTER — ANESTHESIA EVENT (OUTPATIENT)
Dept: SURGERY | Facility: AMBULATORY SURGERY CENTER | Age: 38
End: 2018-05-01

## 2018-05-01 NOTE — TELEPHONE ENCOUNTER
Left the patient a voice message time changed to 7:15am with a 5:45am check in. Left my direct number for a call back.

## 2018-05-02 ENCOUNTER — ANESTHESIA (OUTPATIENT)
Dept: SURGERY | Facility: AMBULATORY SURGERY CENTER | Age: 38
End: 2018-05-02

## 2018-05-02 ENCOUNTER — SURGERY (OUTPATIENT)
Age: 38
End: 2018-05-02

## 2018-05-02 ENCOUNTER — HOSPITAL ENCOUNTER (OUTPATIENT)
Facility: AMBULATORY SURGERY CENTER | Age: 38
End: 2018-05-02
Attending: UROLOGY
Payer: COMMERCIAL

## 2018-05-02 VITALS
BODY MASS INDEX: 38.08 KG/M2 | OXYGEN SATURATION: 93 % | RESPIRATION RATE: 16 BRPM | DIASTOLIC BLOOD PRESSURE: 83 MMHG | HEART RATE: 90 BPM | TEMPERATURE: 96.9 F | WEIGHT: 266 LBS | HEIGHT: 70 IN | SYSTOLIC BLOOD PRESSURE: 129 MMHG

## 2018-05-02 LAB
GLUCOSE BLDC GLUCOMTR-MCNC: 107 MG/DL (ref 70–99)
GLUCOSE BLDC GLUCOMTR-MCNC: 125 MG/DL (ref 70–99)

## 2018-05-02 RX ORDER — SODIUM CHLORIDE, SODIUM LACTATE, POTASSIUM CHLORIDE, CALCIUM CHLORIDE 600; 310; 30; 20 MG/100ML; MG/100ML; MG/100ML; MG/100ML
INJECTION, SOLUTION INTRAVENOUS CONTINUOUS
Status: DISCONTINUED | OUTPATIENT
Start: 2018-05-02 | End: 2018-05-02 | Stop reason: HOSPADM

## 2018-05-02 RX ORDER — CHLORHEXIDINE GLUCONATE ORAL RINSE 1.2 MG/ML
SOLUTION DENTAL PRN
Status: DISCONTINUED | OUTPATIENT
Start: 2018-05-02 | End: 2018-05-02 | Stop reason: HOSPADM

## 2018-05-02 RX ORDER — EPHEDRINE SULFATE 50 MG/ML
INJECTION, SOLUTION INTRAMUSCULAR; INTRAVENOUS; SUBCUTANEOUS PRN
Status: DISCONTINUED | OUTPATIENT
Start: 2018-05-02 | End: 2018-05-02

## 2018-05-02 RX ORDER — KETOROLAC TROMETHAMINE 30 MG/ML
INJECTION, SOLUTION INTRAMUSCULAR; INTRAVENOUS PRN
Status: DISCONTINUED | OUTPATIENT
Start: 2018-05-02 | End: 2018-05-02

## 2018-05-02 RX ORDER — TOLTERODINE 4 MG/1
4 CAPSULE, EXTENDED RELEASE ORAL DAILY PRN
Qty: 30 CAPSULE | Refills: 0 | Status: SHIPPED | OUTPATIENT
Start: 2018-05-02 | End: 2018-08-06

## 2018-05-02 RX ORDER — OXYCODONE HYDROCHLORIDE 5 MG/1
5 TABLET ORAL ONCE
Status: COMPLETED | OUTPATIENT
Start: 2018-05-02 | End: 2018-05-02

## 2018-05-02 RX ORDER — FENTANYL CITRATE 50 UG/ML
25-50 INJECTION, SOLUTION INTRAMUSCULAR; INTRAVENOUS
Status: DISCONTINUED | OUTPATIENT
Start: 2018-05-02 | End: 2018-05-02 | Stop reason: HOSPADM

## 2018-05-02 RX ORDER — ACETAMINOPHEN 325 MG/1
325-650 TABLET ORAL EVERY 6 HOURS PRN
Qty: 45 TABLET | Refills: 0 | Status: SHIPPED | OUTPATIENT
Start: 2018-05-02 | End: 2021-08-06

## 2018-05-02 RX ORDER — LIDOCAINE 40 MG/G
CREAM TOPICAL
Status: DISCONTINUED | OUTPATIENT
Start: 2018-05-02 | End: 2018-05-02 | Stop reason: HOSPADM

## 2018-05-02 RX ORDER — LIDOCAINE HYDROCHLORIDE 20 MG/ML
INJECTION, SOLUTION INFILTRATION; PERINEURAL PRN
Status: DISCONTINUED | OUTPATIENT
Start: 2018-05-02 | End: 2018-05-02

## 2018-05-02 RX ORDER — ONDANSETRON 2 MG/ML
4 INJECTION INTRAMUSCULAR; INTRAVENOUS EVERY 30 MIN PRN
Status: DISCONTINUED | OUTPATIENT
Start: 2018-05-02 | End: 2018-05-03 | Stop reason: HOSPADM

## 2018-05-02 RX ORDER — NALOXONE HYDROCHLORIDE 0.4 MG/ML
.1-.4 INJECTION, SOLUTION INTRAMUSCULAR; INTRAVENOUS; SUBCUTANEOUS
Status: DISCONTINUED | OUTPATIENT
Start: 2018-05-02 | End: 2018-05-03 | Stop reason: HOSPADM

## 2018-05-02 RX ORDER — ONDANSETRON 2 MG/ML
INJECTION INTRAMUSCULAR; INTRAVENOUS PRN
Status: DISCONTINUED | OUTPATIENT
Start: 2018-05-02 | End: 2018-05-02

## 2018-05-02 RX ORDER — ACETAMINOPHEN 325 MG/1
975 TABLET ORAL ONCE
Status: COMPLETED | OUTPATIENT
Start: 2018-05-02 | End: 2018-05-02

## 2018-05-02 RX ORDER — GLYCOPYRROLATE 0.2 MG/ML
INJECTION, SOLUTION INTRAMUSCULAR; INTRAVENOUS PRN
Status: DISCONTINUED | OUTPATIENT
Start: 2018-05-02 | End: 2018-05-02

## 2018-05-02 RX ORDER — BACITRACIN ZINC 500 [USP'U]/G
OINTMENT TOPICAL
Qty: 28 G | Refills: 0 | Status: SHIPPED | OUTPATIENT
Start: 2018-05-02 | End: 2018-08-06

## 2018-05-02 RX ORDER — AMOXICILLIN 250 MG
2 CAPSULE ORAL 2 TIMES DAILY
Qty: 60 TABLET | Refills: 1 | Status: SHIPPED | OUTPATIENT
Start: 2018-05-02 | End: 2018-08-06

## 2018-05-02 RX ORDER — BUPIVACAINE HYDROCHLORIDE AND EPINEPHRINE 5; 5 MG/ML; UG/ML
INJECTION, SOLUTION PERINEURAL PRN
Status: DISCONTINUED | OUTPATIENT
Start: 2018-05-02 | End: 2018-05-02 | Stop reason: HOSPADM

## 2018-05-02 RX ORDER — DEXAMETHASONE SODIUM PHOSPHATE 10 MG/ML
INJECTION, SOLUTION INTRAMUSCULAR; INTRAVENOUS PRN
Status: DISCONTINUED | OUTPATIENT
Start: 2018-05-02 | End: 2018-05-02

## 2018-05-02 RX ORDER — GABAPENTIN 300 MG/1
300 CAPSULE ORAL ONCE
Status: COMPLETED | OUTPATIENT
Start: 2018-05-02 | End: 2018-05-02

## 2018-05-02 RX ORDER — PROPOFOL 10 MG/ML
INJECTION, EMULSION INTRAVENOUS PRN
Status: DISCONTINUED | OUTPATIENT
Start: 2018-05-02 | End: 2018-05-02

## 2018-05-02 RX ORDER — SODIUM CHLORIDE, SODIUM LACTATE, POTASSIUM CHLORIDE, CALCIUM CHLORIDE 600; 310; 30; 20 MG/100ML; MG/100ML; MG/100ML; MG/100ML
INJECTION, SOLUTION INTRAVENOUS CONTINUOUS
Status: DISCONTINUED | OUTPATIENT
Start: 2018-05-02 | End: 2018-05-03 | Stop reason: HOSPADM

## 2018-05-02 RX ORDER — OXYCODONE HYDROCHLORIDE 5 MG/1
5-10 TABLET ORAL EVERY 6 HOURS PRN
Qty: 30 TABLET | Refills: 0 | Status: SHIPPED | OUTPATIENT
Start: 2018-05-02 | End: 2018-08-06

## 2018-05-02 RX ORDER — CIPROFLOXACIN 2 MG/ML
400 INJECTION, SOLUTION INTRAVENOUS EVERY 12 HOURS
Status: DISCONTINUED | OUTPATIENT
Start: 2018-05-02 | End: 2018-05-02 | Stop reason: HOSPADM

## 2018-05-02 RX ORDER — ONDANSETRON 4 MG/1
4 TABLET, ORALLY DISINTEGRATING ORAL EVERY 30 MIN PRN
Status: DISCONTINUED | OUTPATIENT
Start: 2018-05-02 | End: 2018-05-03 | Stop reason: HOSPADM

## 2018-05-02 RX ORDER — NITROFURANTOIN 25; 75 MG/1; MG/1
100 CAPSULE ORAL DAILY
Qty: 30 CAPSULE | Refills: 0 | Status: SHIPPED | OUTPATIENT
Start: 2018-05-02 | End: 2018-08-06

## 2018-05-02 RX ORDER — PROPOFOL 10 MG/ML
INJECTION, EMULSION INTRAVENOUS CONTINUOUS PRN
Status: DISCONTINUED | OUTPATIENT
Start: 2018-05-02 | End: 2018-05-02

## 2018-05-02 RX ORDER — FENTANYL CITRATE 50 UG/ML
INJECTION, SOLUTION INTRAMUSCULAR; INTRAVENOUS PRN
Status: DISCONTINUED | OUTPATIENT
Start: 2018-05-02 | End: 2018-05-02

## 2018-05-02 RX ORDER — CHLORHEXIDINE GLUCONATE ORAL RINSE 1.2 MG/ML
15 SOLUTION DENTAL
Qty: 750 ML | Refills: 0 | Status: SHIPPED | OUTPATIENT
Start: 2018-05-02 | End: 2018-05-17

## 2018-05-02 RX ORDER — CIPROFLOXACIN 500 MG/1
500 TABLET, FILM COATED ORAL ONCE
Qty: 1 TABLET | Refills: 0 | Status: SHIPPED | OUTPATIENT
Start: 2018-05-02 | End: 2018-05-02

## 2018-05-02 RX ADMIN — SODIUM CHLORIDE, SODIUM LACTATE, POTASSIUM CHLORIDE, CALCIUM CHLORIDE: 600; 310; 30; 20 INJECTION, SOLUTION INTRAVENOUS at 10:05

## 2018-05-02 RX ADMIN — ONDANSETRON 4 MG: 2 INJECTION INTRAMUSCULAR; INTRAVENOUS at 10:29

## 2018-05-02 RX ADMIN — Medication 100 MCG: at 08:20

## 2018-05-02 RX ADMIN — Medication 200 MCG: at 08:35

## 2018-05-02 RX ADMIN — SODIUM CHLORIDE, SODIUM LACTATE, POTASSIUM CHLORIDE, CALCIUM CHLORIDE: 600; 310; 30; 20 INJECTION, SOLUTION INTRAVENOUS at 06:21

## 2018-05-02 RX ADMIN — FENTANYL CITRATE 50 MCG: 50 INJECTION, SOLUTION INTRAMUSCULAR; INTRAVENOUS at 07:55

## 2018-05-02 RX ADMIN — ONDANSETRON 4 MG: 2 INJECTION INTRAMUSCULAR; INTRAVENOUS at 07:25

## 2018-05-02 RX ADMIN — Medication 0.5 MG: at 09:42

## 2018-05-02 RX ADMIN — KETOROLAC TROMETHAMINE 30 MG: 30 INJECTION, SOLUTION INTRAMUSCULAR; INTRAVENOUS at 09:32

## 2018-05-02 RX ADMIN — LIDOCAINE HYDROCHLORIDE 100 MG: 20 INJECTION, SOLUTION INFILTRATION; PERINEURAL at 07:25

## 2018-05-02 RX ADMIN — GLYCOPYRROLATE 0.2 MG: 0.2 INJECTION, SOLUTION INTRAMUSCULAR; INTRAVENOUS at 07:30

## 2018-05-02 RX ADMIN — PROPOFOL: 10 INJECTION, EMULSION INTRAVENOUS at 09:55

## 2018-05-02 RX ADMIN — EPHEDRINE SULFATE 5 MG: 50 INJECTION, SOLUTION INTRAMUSCULAR; INTRAVENOUS; SUBCUTANEOUS at 08:38

## 2018-05-02 RX ADMIN — Medication 100 MCG: at 08:21

## 2018-05-02 RX ADMIN — BUPIVACAINE HYDROCHLORIDE AND EPINEPHRINE 10 ML: 5; 5 INJECTION, SOLUTION PERINEURAL at 08:00

## 2018-05-02 RX ADMIN — ACETAMINOPHEN 975 MG: 325 TABLET ORAL at 06:21

## 2018-05-02 RX ADMIN — PROPOFOL: 10 INJECTION, EMULSION INTRAVENOUS at 08:40

## 2018-05-02 RX ADMIN — EPHEDRINE SULFATE 5 MG: 50 INJECTION, SOLUTION INTRAMUSCULAR; INTRAVENOUS; SUBCUTANEOUS at 08:43

## 2018-05-02 RX ADMIN — PROPOFOL 120 MCG/KG/MIN: 10 INJECTION, EMULSION INTRAVENOUS at 07:29

## 2018-05-02 RX ADMIN — CHLORHEXIDINE GLUCONATE ORAL RINSE 5 ML: 1.2 SOLUTION DENTAL at 07:30

## 2018-05-02 RX ADMIN — OXYCODONE HYDROCHLORIDE 5 MG: 5 TABLET ORAL at 11:03

## 2018-05-02 RX ADMIN — CIPROFLOXACIN 400 MG: 2 INJECTION, SOLUTION INTRAVENOUS at 08:23

## 2018-05-02 RX ADMIN — DEXAMETHASONE SODIUM PHOSPHATE 4 MG: 10 INJECTION, SOLUTION INTRAMUSCULAR; INTRAVENOUS at 07:25

## 2018-05-02 RX ADMIN — Medication 50 MG: at 07:25

## 2018-05-02 RX ADMIN — PROPOFOL 100 MCG/KG/MIN: 10 INJECTION, EMULSION INTRAVENOUS at 07:52

## 2018-05-02 RX ADMIN — PROPOFOL 200 MG: 10 INJECTION, EMULSION INTRAVENOUS at 07:25

## 2018-05-02 RX ADMIN — PROPOFOL: 10 INJECTION, EMULSION INTRAVENOUS at 09:17

## 2018-05-02 RX ADMIN — FENTANYL CITRATE 50 MCG: 50 INJECTION, SOLUTION INTRAMUSCULAR; INTRAVENOUS at 07:25

## 2018-05-02 RX ADMIN — GABAPENTIN 300 MG: 300 CAPSULE ORAL at 06:21

## 2018-05-02 NOTE — OP NOTE
PREOPERATIVE DIAGNOSIS: proximal to mid  Bulbar urethral stricture (3cm) .   POSTOPERATIVE DIAGNOSIS: Same  PROCEDURES:   1. Cystoscopy.   2. Bickleton of buccal mucosa graft from the left oral cavity ( 5 cm wide x 2.5 cm long).   3. Preparation of wound bed for grafting   4. Complex single stage posterior urethral reconstruction utilizing a dorsal onlay of buccal mucosa graft. (Recipient site: 5cm X 2.5cm)  SURGEON: Herb Awad MD, MS  ASSISTANT: Alvin Hopper MD; Polo Silvestre MD  INDICATIONS: Mr. Clarence Boyle is a 38 year old gentleman with a bulbar urethral stricture refractory to minimally-invasive management . He has been randomized to the dorsal buccal approach.The risks, benefits and alternatives of the multiple treatment options were described and the patient wished to proceed with urethroplasty. He understood the risks to include but not be limited to bleeding, infection, penile pain or numbness, scrotal pain or numbness, change in erectile or ejaculatory function, lower extremity neuropathy, deep venous thrombosis. He wished to proceed.   DESCRIPTION OF PROCEDURE:   After informed consent was obtained and preoperative antibiotics were given, the patient was taken to the operating room and placed supine on the operating table. General anesthesia was induced. He was intubated.    The patient was placed in the high lithotomy position. The perineum was shaved, prepped and draped in the usual sterile fashion. The mouth was prepped and draped in the usual sterile fashion.   A vertical midline incision was made in the perineum and carried down through Colle ' s fascia.   The bulbospongiosus muscles were split in the midline and reflected off the bulbar urethra.   A 20-Latvian red rubber catheter passed through the meatus and up to the point of obstruction. The urethra was unilaterally mobilized off the corporal bodies from tip of the catheter to just beyond where we believed the proximal end of the stricture to  be.  Holding sutures were placed in the posterior midline of the corpus spongiosum and used to rotate the urethra 180 degrees. A dorsal midline urethrotomy was made with a #15 blade scalpel over the tip of the red rubber catheter. This was in the proximal to mid bulbar urethra. Holding stitches of 4-0 Vicryl were placed on either side of the urethra. The urethrotomy was then continued proximally and distally for a total length of 5cm until the urethra calibrated to 28F proximally and 28F distally. Upon visual inspection the urethral stricture appeared to be 3cm long. We had extended our urethrotomy into normal urethra for a distance of 1cm proximally and 1cm distally.  A flexible cystoscope was advanced through the proximal urethral opening. Additional urethral stricture was none. The voluntary sphincter was intact and was 2cm proximal to our urethrotomy. The prostate was small . Bladder stones were absent. Trabeculations were present. Tumors were absent. The scope was removed.   A 5cm x 2.5cm buccal graft was harvested from the left oral cavity in the standard fashion. Three holding sutures of 2-0 silk were placed in the vermillion border of the lip. The Steinhauser buccal mucosa retractor was put in place. The graft was marked out with calipers and hydrodissection was achieved with 0.5% Marcaine with 1:200,000 epinephrine. The graft was sharply harvested with a #15-blade scalpel taking care to avoid Colton's duct and leave the buccinator muscle down with the patient. The graft was defatted and tapered on the back table using a silicone block.   The corporal bodies (wound bed) were prepared for grafting by ensuring they were free of overlying tissue and controlling all bleeding points with bipolar cautery.  The graft was brought into the field and sewn to these previously placed sutures. The graft was fixed in several places to the coporal bodies with mattress sutures of 4-0 Vicryl. 4-0 Vicryl was also used to  "fixed the lateral edges of the graft to the lateral edges of the corporal bodies. 5-0 PDS interrupted sutures were used to anastomose the proximal and distal apices of the graft to the proximal and distal apices of the urethra. The deep (right) edge of the graft was sewn to the lateral edge of the urethrotomy on the first side with a running 5-0 PDS. The urethra was then rotated back to anatomical position (0 degrees) The lateral edge of the second side of the graft was then sewn to the second side of the urethrotomy with another 5-0 PDS suture. This was all done over 16-Tamazight silicone catheter. The muscle was closed with a running 3-0 Vicryl suture. Subcutaneous fatty layer was closed using 3-0 Vicryl. Colles fascia was closed with a running 3-0 Vicryl suture. Skin was closed with interrupted 4-0 Monocryl sutures.   Xeroform, fluffs and scrotal support were placed. The patient was awakened from anesthesia, transferred to [facility] and then to the postanesthesia care unit in stable condition. There were no complications.   ESTIMATED BLOOD LOSS: 50 mL.   SPECIMENS TO PATHOLOGY: None.     Polo Silvestre MD  Urology Resident      Lithotomy time: 2 hours 25 minutes  Operative Time: 2 hours 17 minutes  5' 10\"  266 lbs 0 oz  Body mass index is 38.17 kg/(m^2).    "

## 2018-05-02 NOTE — IP AVS SNAPSHOT
MRN:[id number]                      After Visit Summary   5/2/2018    Clarence Boyle    MRN: [id number]           Thank you!     Thank you for choosing [facility] for your care. Our goal is always to provide you with excellent care. Hearing back from our patients is one way we can continue to improve our services. Please take a few minutes to complete the written survey that you may receive in the mail after you visit with us. Thank you!        Patient Information     Date Of Birth          1980        About your hospital stay     You were admitted on:  May 2, 2018 [facility]    You were discharged on:  May 2, 2018       Who to Call     For medical emergencies, please call 911.  For non-urgent questions about your medical care, please call your primary care provider or clinic, [phone]  For questions related to your surgery, please call your surgery clinic        Attending Provider     Provider Specialty    Herb Awad MD Urology       Primary Care Provider Office Phone # Fax #    Marcos ARMANI Jo [phone]      After Care Instructions     Discharge Instructions       Procedure:  Urethroplasty     Pain:    You have a buccal mucosa graft done, so usually the mouth hurts more than the groin. For pain you are being prescribed oxycodone, a narcotic pain medication which should only be required for the first three to five days.  Narcotics will cause sleepiness and constipation, therefore it is best to stop or reduce them as soon as you can and switch to using acetaminophen (Tylenol) and/or ibuprofen (Advil/Motrin), taken as directed on the packaging.  Do not take more than 4,000mg of Tylenol (acetaminophen) from all sources in any 24 hour period since this can cause liver damage.      You may notice bladder spasms which can present as sharp sudden pains in the area of bladder (just above the pubic hair) or the  tip of the penis.  This is usually because the catheter irritates the bladder. You may feel like you have to urinate even though the bladder is fully drained. You will be given a prescription (Detrol / tolterodine) of a medication that can help with these.  It is important to take the bladder spasm medicine because occasionally these bladder spasms can get intense enough to cause you to urinate around (rather than through) the catheter, and this rush of urine can damage the urethroplasty work.    If you had a buccal graft, you will notice some tightness and discomfort in one side of your mouth that may limit how much you can open your mouth.  This should improve over the next several weeks.    Urination:  You will go home with the Mercedes catheter in your penis to drain your bladder until your follow-up appointment.  This should be secured at all times to avoid tugging /trauma, especially to the penis.  This catheter will not generally restrict your activities, but you should be careful if you are driving such that they do not become a distraction.  You should apply the bacitracin antibiotic ointment (see below) to the tip of the penis 2-3 times/a day to prevent irritation.  You should also apply this ointment to the incision behind the scrotum. You may notice a little blood, small amount of white discharge, or caking at the tip of the penis. This is normal but it can irritate the catheter insertion sites, so it is best to wash this off in the shower.  It is fine to get the urethral catheter and drain bags wet.     Activity:   - No strenuous exercise for 6 weeks but walking and stairs are fine. You may gradually return to normal activity and normal lifting over the course of 6 weeks but take care not to strain your incisions.  Use good judgment: if something hurts then don't do it.  - You should not soak in a tub or pool until the catheter is removed but daily showering is important for healing.  - Do not strain with  bowel movements.    - Do not drive until you can press the brake pedal quickly and fully without pain.   - Do not operate a motor vehicle while taking narcotic pain medications.     Diet:  You may return to your normal diet that you were taking before surgery. If you had a buccal graft used as part of your surgery, you may find soft food like yogurt and smoothies more comfortable for the first couple of days, but there are no specific restrictions.    Wound Care:    -- Mouth -- the stitches will dissolve in about a week. You will be given a mouth wash to keep the incision clean (see below).  -- You will have an incision between your scrotum and anus. These will be closed with stitches that will dissolve on their own and do not need to be removed.  You will not need any specific bandage on this area, but you may find wearing a small pad in your underwear can help protect from blood staining your underwear. It is recommended that you wear your scrotal support for the first week following surgery to help reduce swelling (please wash scrotal support if it becomes soiled).   You can shower and let the water run over your incisions but you should not scrub them with soap.      Medications:  1) Oxycodone- this is a narcotic pain medication which you should only need for three to five days after surgery.    2) Ditropan - This is a bladder spasm medicine which you should take three times daily  3) Bacitracin Ointment - This is an antibiotic ointment which will help with discomfort from the fried cathteter  4) Macrobid - This is an antibiotic pill which you will take daily to prevent infection while the catheter is in place  5) Ciprofloxacin  - This is an antibiotic pill to take the morning of your Fried catheter removal appointment  6) Pericolace (Senna/docusate) - This is a stool softener to take twice daily.  The surgery, pain medications, and bladder spasm medications can lead to constipation.  You can stop or reduce the  pericolace if you are having loose stools.  Other over the counter solutions such as prune juice, miralax, fiber products, senna, and dulcolax can also be used.  If you have not had a bowel movement in 3 days start over-the-counter Milk of Magnesia taken twice daily as directed until you have a good result.   7) Peridex - if you had a buccal graft you will also have this mouth rinse to use every two hours while you are awake, until it is gone to help prevent infection and promote healing.    Follow-up:  Please call Dr. Awad's nurse on the Monday after your surgery at [phone] to update him on your progress and so he can answer any questions you have.   Your follow-up appointment in Dr. Awad's clinic is on: 5/21/18 in the outpatient Urology Clinic. This appointment may be with our Physician's Assistant, or with Dr. Awad's fellow, rather than Dr. Awad. They are part of his team and very experienced in the post-operative care of his patients.      You will have an X-ray and catheter removal done immediately before the clinic visit: Please take your Ciprofloxacin tablet prior to these appointments.   Call or return sooner than your regularly scheduled visit if you develop any of the following:  Fever (greater than 101.3F) or flu-like symptoms, uncontrolled pain, uncontrolled nausea or vomiting, as well as increased redness, swelling, or drainage from your wound.  Call or return sooner if you notice bladder or kidney pain, difficulty with your catheter drainage or problem with your drains (redness, pain or increasing drain output (rather than decreasing output)).     Questions:  If you have any questions here are some phone numbers and emails you can use to reach us:  - Dr. Awad's nurse- [phone]  - Nursing phone helpline at the Urology Clinic (8A-5P M-F): [phone] and choose option 3  - Nights or weekends, call [phone] and ask the  to page the urology resident on call.   - For  emergencies, always call 911                  Your next 10 appointments already scheduled     May 21, 2018  2:00 PM CDT   XR CYSTOGRAM VOIDING with UUXR4   [facility], [place],  Radiology ([facility], [place])    [address] [facility] [id number] [phone]           Please bring a list of your current medicines to your exam. (Include vitamins, minerals and over-thecounter medicines.) Leave your valuables at home.  Tell your doctor if there is a chance you may be pregnant.  You do not need to do anything special for this exam.            May 21, 2018  4:30 PM CDT   (Arrive by 4:15 PM)   Post-Op with Janae Juarez PA-C   [facility] ([facility])    [address] [address] [phone]              Further instructions from your care team       [facility]  Home Care Following Anesthesia  For 24 hours after surgery:  1. Get plenty of rest.  A responsible adult must stay with you for at least 24 hours after you leave the surgery center.  2. Do not drive or use heavy equipment.  If you have weakness or tingling, don't drive or use heavy equipment until this feeling goes away.   3. Do not drink alcohol.   4. Avoid strenuous or risky activities.  Ask for help when climbing stairs.  5. You may feel lightheaded.  IF so, sit for a few minutes before standing.  Have someone help you get up.   6. If you have nausea (feel sick to your stomach): Drink only clear liquids such as apple juice, ginger ale, broth or 7-Up.  Rest may also help.  Be sure to drink enough fluids.  Move to a regular diet as you feel able.   7. You may have a slight fever.  Call the doctor if your fever is over 100 F (37.7 C) (taken under the tongue) or lasts longer than 24 hours.  8. You may have a dry mouth, a sore throat, muscle aches or trouble  sleeping. These should go away after 24 hours.  9. Do not make important or legal decisions.       Tips for taking pain medications  To get the best pain relief possible, remember these points:    Take pain medications as directed, before pain becomes severe.    Pain medication can upset your stomach: taking it with food may help.    Constipation is a common side effect of pain medication. Drink plenty of  fluids.    Eat foods high in fiber. Take a stool softener if recommended by your doctor or pharmacist.    Do not drink alcohol, drive or operate machinery while taking pain medications.    Ask about other ways to control pain, such as with heat, ice or relaxation.    Tylenol/Acetaminophen Consumption  To help encourage the safe use of acetaminophen, the makers of TYLENOL  have lowered the maximum daily dose for single-ingredient Extra Strength TYLENOL  (acetaminophen) products sold in the [place] from 8 pills per day (4,000 mg) to 6 pills per day (3,000 mg). The dosing interval has also changed from 2 pills every 4-6 hours to 2 pills every 6 hours.    If you feel your pain relief is insufficient, you may take Tylenol/Acetaminophen in addition to your narcotic pain medication.     Be careful not to exceed 3,000 mg of Tylenol/Acetaminophen in a 24 hour period from all sources.    If you are taking extra strength Tylenol/acetaminophen (500 mg), the maximum dose is 6 tablets in 24 hours.    If you are taking regular strength acetaminophen (325 mg), the maximum dose is 9 tablets in 24 hours.    Call a doctor for any of the followin. Signs of infection (fever, growing tenderness at the surgery site, a large amount of drainage or bleeding, severe pain, foul-smelling drainage, redness, swelling).  2. It has been over 8 to 10 hours since surgery and you are still not able to urinate (pass water).  3. Headache for over 24 hours.  Your doctor is:  Dr. Herb Wong, Prostate and Urology: [phone]                    Or  dial [phone] and ask for the resident on call for:  Prostate Urology  For emergency care, call the:  [facility]:  [phone] (TTY for hearing impaired: [phone])    Care of Indwelling Mercedes Catheter  Cleanliness is VERY important!  1. Wash well around catheter with soap and water and rinse well.  Do this every morning and before bedtime.  2. Empty leg bag or bed bag into toilet whenever it becomes half full.  3. When disconnecting and reconnecting, wipe both the catheter end and tubing tip with alcohol. (You may use commercially prepared alcohol wipes or regular cotton balls soaked in alcohol.)  4. Rinse leg bag and bed bag inside and out after each use. You can soak leg bag and/or bed bag in two to three ounces of white vinegar when not in use. Rinse thoroughly before reconnecting to catheter.  5. You may clamp the catheter for short periods of time (two to three hours) if you are not uncomfortable. If planning intercourse: clamp catheter, disconnect from bag and   a) Tape catheter along shaft of penis, if male; or  b) Tape catheter to abdomen, if female  6. Drink lots of fluids (at least eight to ten cups/glasses per day) and take two to four grams of Vitamin C (optional) per day.      7. Watch for sign of catheter-associated urinary tract infection which include:      Cloudy urine, sediment in urine (may look like sand particles or white flakes), foul smelling urine    A burning feeling, pressure or pain in your lower abdomen    A burning feeling in the urethra or genitalia    Aching in the back (by the kidney)                  Pending Results     No orders found from 4/30/2018 to 5/3/2018.            Admission Information     Date & Time Provider Department Dept. Phone    5/2/2018 Herb Awad MD [facility] [phone]      Your Vitals Were     Blood Pressure Pulse Temperature Respirations Height Weight    102/72 95 97.1  F (36.2  C) (Temporal)  "14 1.778 m (5' 10\") 120.7 kg (266 lb)    Pulse Oximetry BMI (Body Mass Index)                95% 38.17 kg/m2          [employer] Information     [employer] is an electronic gateway that provides easy, online access to your medical records. With [employer], you can request a clinic appointment, read your test results, renew a prescription or communicate with your care team.     To sign up for [employer] visit the website at [email or web address]   You will be asked to enter the access code listed below, as well as some personal information. Please follow the directions to create your username and password.     Your access code is: [email or web address]  Expires: 2018  6:31 AM     Your access code will  in 90 days. If you need help or a new code, please contact your [facility] or call [phone] for assistance.        Care EveryWhere ID     This is your Care EveryWhere ID. This could be used by other organizations to access your [facility] medical records  [id number]        Equal Access to Services     ABNER TERRY AH: Hadii dominik Lazaro, warishida ailin, qaybchun ganallissy yarbrough, abbe ledesma . So [facility] [phone].    ATENCIÓN: Si habla español, tiene a he disposición servicios gratuitos de asistencia lingüística. Lldg al [phone].    We comply with applicable federal civil rights laws and [place] laws. We do not discriminate on the basis of race, color, national origin, age, disability, sex, sexual orientation, or gender identity.               Review of your medicines      START taking        Dose / Directions    acetaminophen 325 MG tablet   Commonly known as:  TYLENOL   Used for:  Other specified causes of urethral stricture        Dose:  325-650 mg   Take 1-2 tablets (325-650 mg) by mouth every 6 hours as needed for mild pain   Quantity:  45 tablet   Refills:  0       bacitracin ointment   Used for:  Other specified causes of urethral " stricture        Apply to the incision twice daily for 7 days to prevent a wound infection   Quantity:  28 g   Refills:  0       chlorhexidine 0.12 % solution   Commonly known as:  PERIDEX   Used for:  Other specified causes of urethral stricture        Dose:  15 mL   Swish and spit 15 mLs in mouth every 2 hours (while awake) Do this for 7 days   Quantity:  750 mL   Refills:  0       nitroFURantoin (macrocrystal-monohydrate) 100 MG capsule   Commonly known as:  MACROBID   Used for:  Other specified causes of urethral stricture        Dose:  100 mg   Take 1 capsule (100 mg) by mouth daily Take while your catheter is in place   Quantity:  30 capsule   Refills:  0       oxyCODONE IR 5 MG tablet   Commonly known as:  ROXICODONE   Used for:  Other specified causes of urethral stricture        Dose:  5-10 mg   Take 1-2 tablets (5-10 mg) by mouth every 6 hours as needed for severe pain maximum 6 tablet(s) per day   Quantity:  30 tablet   Refills:  0       senna-docusate 8.6-50 MG per tablet   Commonly known as:  SENOKOT-S;PERICOLACE   Used for:  Other specified causes of urethral stricture        Dose:  2 tablet   Take 2 tablets by mouth 2 times daily to prevent constipation with narcotic pain medication. Hold if you have loose stools.   Quantity:  60 tablet   Refills:  1       tolterodine 4 MG 24 hr capsule   Commonly known as:  DETROL LA   Used for:  Other specified causes of urethral stricture        Dose:  4 mg   Take 1 capsule (4 mg) by mouth daily as needed   Quantity:  30 capsule   Refills:  0         CONTINUE these medicines which have NOT CHANGED        Dose / Directions    B-D U/F 31G X 8 MM   Generic drug:  insulin pen needle        FOR ADMINISTERING INSULIN (3 HUMALOG AND 1 LANTUS)   Refills:  0       MARYURI CONTOUR test strip   Generic drug:  blood glucose monitoring        Dispense test strips covered by the patient insurance. Test 3 times per day.   Refills:  0       dapagliflozin 10 MG Tabs tablet   Commonly  known as:  FARXIGA        Dose:  10 mg   Take 10 mg by mouth   Refills:  0       LANTUS SC        Dose:  70 Units   Inject 70 Units Subcutaneous At Bedtime   Refills:  0       lisinopril 10 MG tablet   Commonly known as:  PRINIVIL/ZESTRIL        Dose:  10 mg   Take 10 mg by mouth daily (with dinner)   Refills:  0       loratadine 10 MG tablet   Commonly known as:  CLARITIN        Dose:  10 mg   Take 10 mg by mouth   Refills:  0       metFORMIN 500 MG 24 hr tablet   Commonly known as:  GLUCOPHAGE-XR        Dose:  1000 mg   Take 1,000 mg by mouth daily (with dinner)   Refills:  0       MULTI COMPLETE PO        Refills:  0       NovoLOG FLEXPEN 100 UNIT/ML injection   Generic drug:  insulin aspart        Refills:  0       simvastatin 20 MG tablet   Commonly known as:  ZOCOR        Dose:  20 mg   Take 20 mg by mouth   Refills:  0       tadalafil 10 MG tablet   Commonly known as:  CIALIS        Dose:  10 mg   Take 10 mg by mouth   Refills:  0         STOP taking     ASPIRIN 81 PO                Where to get your medicines      These medications were sent to [address] [address]    Hours:  TRANSPLANT PHONE NUMBER [phone] Phone:  [phone]     acetaminophen 325 MG tablet    bacitracin ointment    chlorhexidine 0.12 % solution    nitroFURantoin (macrocrystal-monohydrate) 100 MG capsule    senna-docusate 8.6-50 MG per tablet    tolterodine 4 MG 24 hr capsule         Some of these will need a paper prescription and others can be bought over the counter. Ask your nurse if you have questions.     Bring a paper prescription for each of these medications     oxyCODONE IR 5 MG tablet                Protect others around you: Learn how to safely use, store and throw away your medicines at www.disposemymeds.org.        Information about OPIOIDS     PRESCRIPTION OPIOIDS: WHAT YOU NEED TO KNOW   You have a  prescription for an opioid (narcotic) pain medicine. Opioids can cause addiction. If you have a history of chemical dependency of any type, you are at a higher risk of becoming addicted to opioids. Only take this medicine after all other options have been tried. Take it for as short a time and as few doses as possible.     Do not:    Drive. If you drive while taking these medicines, you could be arrested for driving under the influence (DUI).    Operate heavy machinery    Do any other dangerous activities while taking these medicines.     Drink any alcohol while taking these medicines.      Take with any other medicines that contain acetaminophen. Read all labels carefully. Look for the word  acetaminophen  or  Tylenol.  Ask your pharmacist if you have questions or are unsure.    Store your pills in a secure place, locked if possible. We will not replace any lost or stolen medicine. If you don t finish your medicine, please throw away (dispose) as directed by your pharmacist. The [place] [employer]ution [employer] has more information about safe disposal: https://www.pca.[facility].mn.us/living-green/managing-unwanted-medications    All opioids tend to cause constipation. Drink plenty of water and eat foods that have a lot of fiber, such as fruits, vegetables, prune juice, apple juice and high-fiber cereal. Take a laxative (Miralax, milk of magnesia, Colace, Senna) if you don t move your bowels at least every other day.              Medication List: This is a list of all your medications and when to take them. Check marks below indicate your daily home schedule. Keep this list as a reference.      Medications           Morning Afternoon Evening Bedtime As Needed    acetaminophen 325 MG tablet   Commonly known as:  TYLENOL   Take 1-2 tablets (325-650 mg) by mouth every 6 hours as needed for mild pain   Last time this was given:  975 mg on 5/2/2018  6:21 AM                                B-D U/F 31G X 8 MM   FOR  ADMINISTERING INSULIN (3 HUMALOG AND 1 LANTUS)   Generic drug:  insulin pen needle                                bacitracin ointment   Apply to the incision twice daily for 7 days to prevent a wound infection                                [email or web address] CONTOUR test strip   Dispense test strips covered by the patient insurance. Test 3 times per day.   Generic drug:  blood glucose monitoring                                chlorhexidine 0.12 % solution   Commonly known as:  PERIDEX   Swish and spit 15 mLs in mouth every 2 hours (while awake) Do this for 7 days   Last time this was given:  5 mLs on 5/2/2018  7:30 AM                                dapagliflozin 10 MG Tabs tablet   Commonly known as:  FARXIGA   Take 10 mg by mouth                                LANTUS SC   Inject 70 Units Subcutaneous At Bedtime                                lisinopril 10 MG tablet   Commonly known as:  PRINIVIL/ZESTRIL   Take 10 mg by mouth daily (with dinner)                                loratadine 10 MG tablet   Commonly known as:  CLARITIN   Take 10 mg by mouth                                metFORMIN 500 MG 24 hr tablet   Commonly known as:  GLUCOPHAGE-XR   Take 1,000 mg by mouth daily (with dinner)                                MULTI COMPLETE PO                                nitroFURantoin (macrocrystal-monohydrate) 100 MG capsule   Commonly known as:  MACROBID   Take 1 capsule (100 mg) by mouth daily Take while your catheter is in place                                NovoLOG FLEXPEN 100 UNIT/ML injection   Generic drug:  insulin aspart                                oxyCODONE IR 5 MG tablet   Commonly known as:  ROXICODONE   Take 1-2 tablets (5-10 mg) by mouth every 6 hours as needed for severe pain maximum 6 tablet(s) per day   Last time this was given:  5 mg on 5/2/2018 11:03 AM                                senna-docusate 8.6-50 MG per tablet   Commonly known as:  SENOKOT-S;PERICOLACE   Take 2 tablets by mouth 2 times daily to  prevent constipation with narcotic pain medication. Hold if you have loose stools.                                simvastatin 20 MG tablet   Commonly known as:  ZOCOR   Take 20 mg by mouth                                tadalafil 10 MG tablet   Commonly known as:  CIALIS   Take 10 mg by mouth                                tolterodine 4 MG 24 hr capsule   Commonly known as:  DETROL LA   Take 1 capsule (4 mg) by mouth daily as needed

## 2018-05-02 NOTE — ANESTHESIA POSTPROCEDURE EVALUATION
Patient: Clarence Boyle    Procedure(s):  Posterior Urethroplasty with Single Buccal Mucosa Graft - Wound Class: I-Clean    Diagnosis:Urethral Stricture  Diagnosis Additional Information: No value filed.    Anesthesia Type:  General, ETT    Note:  Anesthesia Post Evaluation    Patient location during evaluation: bedside  Patient participation: Able to fully participate in evaluation  Level of consciousness: awake  Pain management: adequate  Airway patency: patent  Cardiovascular status: acceptable  Respiratory status: acceptable  Hydration status: acceptable  PONV: controlled     Anesthetic complications: None          Last vitals:  Vitals:    05/02/18 1138 05/02/18 1223 05/02/18 1244   BP: 104/71 110/70 114/70   Pulse: 82 95 93   Resp:      Temp: 35.9  C (96.7  F) 36.2  C (97.2  F) 35.9  C (96.7  F)   SpO2: 95% 92%          Electronically Signed By: Balaji Ochoa MD, MD  May 2, 2018  1:01 PM

## 2018-05-02 NOTE — BRIEF OP NOTE
[facility]    Brief Operative Note    Pre-operative diagnosis: Urethral Stricture  Post-operative diagnosis Same  Procedure: Procedure(s):  Posterior Urethroplasty with Single Buccal Mucosa Graft - Wound Class: I-Clean   Surgeon: Surgeon(s) and Role:     * Herb Awad MD - Primary     * Jaron Hopper MD - Assisting     * Polo Silvestre MD-Resident  Anesthesia: General   Estimated blood loss: 50  Drains: Mercedes  Specimens: None  Findings: 3cm mid to proximal bulbar sx. Dorsal buccal  Complications: None.

## 2018-05-02 NOTE — DISCHARGE INSTRUCTIONS
[facility]  Home Care Following Anesthesia  For 24 hours after surgery:  1. Get plenty of rest.  A responsible adult must stay with you for at least 24 hours after you leave the surgery center.  2. Do not drive or use heavy equipment.  If you have weakness or tingling, don't drive or use heavy equipment until this feeling goes away.   3. Do not drink alcohol.   4. Avoid strenuous or risky activities.  Ask for help when climbing stairs.  5. You may feel lightheaded.  IF so, sit for a few minutes before standing.  Have someone help you get up.   6. If you have nausea (feel sick to your stomach): Drink only clear liquids such as apple juice, ginger ale, broth or 7-Up.  Rest may also help.  Be sure to drink enough fluids.  Move to a regular diet as you feel able.   7. You may have a slight fever.  Call the doctor if your fever is over 100 F (37.7 C) (taken under the tongue) or lasts longer than 24 hours.  8. You may have a dry mouth, a sore throat, muscle aches or trouble sleeping. These should go away after 24 hours.  9. Do not make important or legal decisions.       Tips for taking pain medications  To get the best pain relief possible, remember these points:    Take pain medications as directed, before pain becomes severe.    Pain medication can upset your stomach: taking it with food may help.    Constipation is a common side effect of pain medication. Drink plenty of  fluids.    Eat foods high in fiber. Take a stool softener if recommended by your doctor or pharmacist.    Do not drink alcohol, drive or operate machinery while taking pain medications.    Ask about other ways to control pain, such as with heat, ice or relaxation.    Tylenol/Acetaminophen Consumption  To help encourage the safe use of acetaminophen, the makers of TYLENOL  have lowered the maximum daily dose for single-ingredient Extra Strength TYLENOL  (acetaminophen) products sold in the [place] from 8 pills per  day (4,000 mg) to 6 pills per day (3,000 mg). The dosing interval has also changed from 2 pills every 4-6 hours to 2 pills every 6 hours.    If you feel your pain relief is insufficient, you may take Tylenol/Acetaminophen in addition to your narcotic pain medication.     Be careful not to exceed 3,000 mg of Tylenol/Acetaminophen in a 24 hour period from all sources.    If you are taking extra strength Tylenol/acetaminophen (500 mg), the maximum dose is 6 tablets in 24 hours.    If you are taking regular strength acetaminophen (325 mg), the maximum dose is 9 tablets in 24 hours.    Call a doctor for any of the followin. Signs of infection (fever, growing tenderness at the surgery site, a large amount of drainage or bleeding, severe pain, foul-smelling drainage, redness, swelling).  2. It has been over 8 to 10 hours since surgery and you are still not able to urinate (pass water).  3. Headache for over 24 hours.  Your doctor is:  Dr. Herb Wong, Prostate and Urology: [phone]                    Or dial [phone] and ask for the resident on call for:  Prostate Urology  For emergency care, call the:  [place]:  [phone] (TTY for hearing impaired: [phone])    Care of Indwelling Mercedes Catheter  Cleanliness is VERY important!  1. Wash well around catheter with soap and water and rinse well.  Do this every morning and before bedtime.  2. Empty leg bag or bed bag into toilet whenever it becomes half full.  3. When disconnecting and reconnecting, wipe both the catheter end and tubing tip with alcohol. (You may use commercially prepared alcohol wipes or regular cotton balls soaked in alcohol.)  4. Rinse leg bag and bed bag inside and out after each use. You can soak leg bag and/or bed bag in two to three ounces of white vinegar when not in use. Rinse thoroughly before reconnecting to catheter.  5. You may clamp the catheter for short periods of time (two to three hours) if you are  not uncomfortable. If planning intercourse: clamp catheter, disconnect from bag and   a) Tape catheter along shaft of penis, if male; or  b) Tape catheter to abdomen, if female  6. Drink lots of fluids (at least eight to ten cups/glasses per day) and take two to four grams of Vitamin C (optional) per day.      7. Watch for sign of catheter-associated urinary tract infection which include:      Cloudy urine, sediment in urine (may look like sand particles or white flakes), foul smelling urine    A burning feeling, pressure or pain in your lower abdomen    A burning feeling in the urethra or genitalia    Aching in the back (by the kidney)

## 2018-05-02 NOTE — ANESTHESIA CARE TRANSFER NOTE
Patient: Clarence Boyle    Procedure(s):  Posterior Urethroplasty with Single Buccal Mucosa Graft - Wound Class: I-Clean    Diagnosis: Urethral Stricture  Diagnosis Additional Information: No value filed.    Anesthesia Type:   General, ETT     Note:  Airway :Face Mask  Patient transferred to:PACU  Comments: To PACU pt. Alert O2 via face mask @ 8 liters. Report given to RNHandoff Report: Identifed the Patient, Identified the Reponsible Provider, Reviewed the pertinent medical history, Discussed the surgical course, Reviewed Intra-OP anesthesia mangement and issues during anesthesia, Set expectations for post-procedure period and Allowed opportunity for questions and acknowledgement of understanding      Vitals: (Last set prior to Anesthesia Care Transfer)    CRNA VITALS  5/2/2018 1007 - 5/2/2018 1042      5/2/2018             Pulse: 95    SpO2: 92 %    Resp Rate (set): 10                Electronically Signed By: NAN Chen CRNA  May 2, 2018  10:42 AM

## 2018-05-02 NOTE — ANESTHESIA PREPROCEDURE EVALUATION
Anesthesia Evaluation     . Pt has had prior anesthetic. Type: General    No history of anesthetic complications          ROS/MED HX    ENT/Pulmonary:  - neg pulmonary ROS     Neurologic:  - neg neurologic ROS     Cardiovascular:     (+) hypertension----. : . . . :. .       METS/Exercise Tolerance:  4 - Raking leaves, gardening   Hematologic:  - neg hematologic  ROS       Musculoskeletal:  - neg musculoskeletal ROS       GI/Hepatic:  - neg GI/hepatic ROS       Renal/Genitourinary:         Endo:     (+) type II DM Last HgA1c: 7.5 Using insulin - not using insulin pump Obesity, .      Psychiatric:  - neg psychiatric ROS       Infectious Disease:         Malignancy:         Other:                     Physical Exam  Normal systems: dental    Airway   Mallampati: II  TM distance: >3 FB  Neck ROM: full    Dental     Cardiovascular   Rhythm and rate: regular and normal      Pulmonary    breath sounds clear to auscultation                    Anesthesia Plan      History & Physical Review  History and physical reviewed and following examination; no interval change.    ASA Status:  3 .    NPO Status:  > 6 hours    Plan for General and ETT with Intravenous induction. Maintenance will be TIVA.    PONV prophylaxis:  Ondansetron (or other 5HT-3) and Dexamethasone or Solumedrol       Postoperative Care  Postoperative pain management:  Oral pain medications and Multi-modal analgesia.      Consents  Anesthetic plan, risks, benefits and alternatives discussed with:  Patient..                          .

## 2018-05-02 NOTE — IP AVS SNAPSHOT
[facility]    [address] [place] [address]    Phone:  [phone]    Fax:  [phone]                                       After Visit Summary   5/2/2018    Clarence Boyle    MRN: [id number]           After Visit Summary Signature Page     I have received my discharge instructions, and my questions have been answered. I have discussed any challenges I see with this plan with the nurse or doctor.    ..........................................................................................................................................  Patient/Patient Representative Signature      ..........................................................................................................................................  Patient Representative Print Name and Relationship to Patient    ..................................................               ................................................  Date                                            Time    ..........................................................................................................................................  Reviewed by Signature/Title    ...................................................              ..............................................  Date                                                            Time

## 2018-05-03 ENCOUNTER — CARE COORDINATION (OUTPATIENT)
Dept: UROLOGY | Facility: CLINIC | Age: 38
End: 2018-05-03

## 2018-05-03 ENCOUNTER — COMMUNICATION - HEALTHEAST (OUTPATIENT)
Dept: ENDOCRINOLOGY | Facility: CLINIC | Age: 38
End: 2018-05-03

## 2018-05-03 DIAGNOSIS — Z79.4 TYPE 2 DIABETES MELLITUS WITHOUT COMPLICATION, WITH LONG-TERM CURRENT USE OF INSULIN (H): ICD-10-CM

## 2018-05-03 DIAGNOSIS — E11.9 TYPE 2 DIABETES MELLITUS WITHOUT COMPLICATION, WITH LONG-TERM CURRENT USE OF INSULIN (H): ICD-10-CM

## 2018-05-03 NOTE — PROGRESS NOTES
Urology Postop Phone Note:    Mr. Clarence Boyle is a 38 year old male who underwent Posterior urethroplasty with single buccal mucosa graft on 5/2/18 with Dr. Awad for a history of Bulbous urethral stricture.  His postoperative course was unremarkable and the patient was d/c to home on 5/2/18.    Fevers/chills: Patient denies any fever or chills.  Eating/drinking: Patient is able to eat and drink without any complaints.  Bowel habits: Patient reports no bowel movement since surgery.  Urine output Mercedes catheter Color Dark Yellow Clarity Clear Odor None  Incisions: Patient denies any signs or symptoms of infection.  Pain: Patient reports pain as a 2 out of 10.  The pain is located at incision site.  Narcotics: The patient has been rotating Oxycodone 5 mg and Tylenol for pain medication and is able to control the pain.  Antibiotics: Yes. Cipro 500 mg once prior to catheter removal.    Follow up appointment scheduled on 5/21/18 at 1630 with Janae Juarez PA-C.  [facility] at 1400.    Informed the patient of the clinic triage nursing # ([phone] option 3) and urology resident on call # for after hours concerns.    Megan Villareal, JERRY  Care Coordinator - Reconstructive Urology

## 2018-05-08 ENCOUNTER — CARE COORDINATION (OUTPATIENT)
Dept: UROLOGY | Facility: CLINIC | Age: 38
End: 2018-05-08

## 2018-05-08 NOTE — PROGRESS NOTES
Patient states having pain and swelling in scrotum area from Posterior urethroplasty with single buccal mucosa graft on 5/2/18 with Dr. Awad.  Explained to patient that 7-10 days post op the swelling can be at it's worst.  Patient took his last oxycodone 5 mg this morning and is worried he will not be able to tolerate the pain.  I recommended patient try rotating Tylenol 1000 mg and Ibuprofen 400 mg every 3-6 hours, not to take more then 4000 mg of Tylenol per day.  Will check back in with patient tomorrow to see how patient is coping with pain/swelling.    Megan Villareal, JERRY  Care Coordinator- Reconstructive Urology

## 2018-05-09 ENCOUNTER — CARE COORDINATION (OUTPATIENT)
Dept: UROLOGY | Facility: CLINIC | Age: 38
End: 2018-05-09

## 2018-05-09 NOTE — PROGRESS NOTES
Left voicemail to see how patient's swelling/pain is in scrotum area from Posterior urethroplasty with single buccal mucosa graft on 5/2/18 with Dr. Awad.  Recommended yesterday that patient try rotating Tylenol 1000 mg and Ibuprofen 400 mg every 3-6 hours, not to take more then 4000 mg of Tylenol per day. Waiting to hear back from patient.    Megan Villareal RN  Care Coordinator- Reconstructive Urology

## 2018-05-11 ENCOUNTER — CARE COORDINATION (OUTPATIENT)
Dept: UROLOGY | Facility: CLINIC | Age: 38
End: 2018-05-11

## 2018-05-11 NOTE — PROGRESS NOTES
"    Picture above is of left buccal graft from Posterior urethroplasty with single buccal mucosa graft on 5/2/18 with Dr. Awad.  Patient reports increased swelling in left cheek, intermittent pain, an \"odd taste in his mouth\", no fever/chills.  Per Dr. Awad  graft looks really good, will continue to heal.  Patient states understanding.    Megan Villareal RN  Care Coordinator- Reconstructive Urology        "

## 2018-05-14 ENCOUNTER — PRE VISIT (OUTPATIENT)
Dept: UROLOGY | Facility: CLINIC | Age: 38
End: 2018-05-14

## 2018-05-14 NOTE — TELEPHONE ENCOUNTER
Patient is coming in to see Janae Juarez PA-C for a post op appointment for proximal to mid  Bulbar urethral stricture (3cm).

## 2018-05-17 ENCOUNTER — OFFICE VISIT (OUTPATIENT)
Dept: UROLOGY | Facility: CLINIC | Age: 38
End: 2018-05-17
Payer: COMMERCIAL

## 2018-05-17 ENCOUNTER — RECORDS - HEALTHEAST (OUTPATIENT)
Dept: ADMINISTRATIVE | Facility: OTHER | Age: 38
End: 2018-05-17

## 2018-05-17 VITALS
DIASTOLIC BLOOD PRESSURE: 86 MMHG | SYSTOLIC BLOOD PRESSURE: 124 MMHG | HEIGHT: 70 IN | WEIGHT: 265.3 LBS | BODY MASS INDEX: 37.98 KG/M2

## 2018-05-17 DIAGNOSIS — S30.22XA SCROTAL HEMATOMA: Primary | ICD-10-CM

## 2018-05-17 DIAGNOSIS — N35.912 BULBOUS URETHRAL STRICTURE: ICD-10-CM

## 2018-05-17 ASSESSMENT — PAIN SCALES - GENERAL: PAINLEVEL: MILD PAIN (3)

## 2018-05-17 NOTE — MR AVS SNAPSHOT
After Visit Summary   5/17/2018    Clarence Boyle    MRN: [id number]           Patient Information     Date Of Birth          1980        Visit Information        Provider Department      5/17/2018 9:00 AM Janae Juarez PA-C [facility]        Today's Diagnoses     Scrotal hematoma    -  1    Bulbous urethral stricture           Follow-ups after your visit        Your next 10 appointments already scheduled     May 21, 2018  2:00 PM CDT   XR CYSTOGRAM VOIDING with [id number]   [facility] [place],  Radiology ([facility])    [address] [id number] [phone]           Please bring a list of your current medicines to your exam. (Include vitamins, minerals and over-thecounter medicines.) Leave your valuables at home.  Tell your doctor if there is a chance you may be pregnant.  You do not need to do anything special for this exam.            May 21, 2018  4:30 PM CDT   (Arrive by 4:15 PM)   Post-Op with ANTWON Leung [facility] Urologic C[facility] ([facility])    [address] [id number] [phone]              Who to contact     Please call your clinic at [phone] to:    Ask questions about your health    Make or cancel appointments    Discuss your medicines    Learn about your test results    Speak to your doctor            Additional Information About Your Visit        MyChart Information     [employer] is an electronic gateway that provides easy, online access to your medical records. With [employer], you can request a clinic appointment, read your test results, renew a prescription or communicate with your care team.     To sign up for [employer] visit the website at [email or web address]   You will be asked to enter the access code listed below, as well as some personal  "information. Please follow the directions to create your username and password.     Your access code is: [email or web address]  Expires: 2018  6:31 AM     Your access code will  in 90 days. If you need help or a new code, please contact your [facility] or call [phone] for assistance.        Care EveryWhere ID     This is your Care EveryWhere ID. This could be used by other organizations to access your [facility] medical records  [id number]        Your Vitals Were     Height BMI (Body Mass Index)                1.778 m (5' 10\") 38.07 kg/m2           Blood Pressure from Last 3 Encounters:   18 124/86   18 129/83   18 113/72    Weight from Last 3 Encounters:   18 120.3 kg (265 lb 4.8 oz)   18 120.7 kg (266 lb)   18 118.4 kg (261 lb)              Today, you had the following     No orders found for display       Primary Care Provider Office Phone # Fax #    Marcos ARMANI Jo [phone]       [facility] [address] [place] [address]        Equal Access to Services     ABNER TERRY AH: Hadii aad ku hadasho Soomaali, waaxda luqadaha, qaybta kaalmada adeegyada, waxay idiin hayalanisn sarah trujillo lakassidy . So [facility] [phone].    ATENCIÓN: Si habla español, tiene a eh disposición servicios gratuitos de asistencia lingüística. Llame al [phone].    We comply with applicable federal civil rights laws and [place] laws. We do not discriminate on the basis of race, color, national origin, age, disability, sex, sexual orientation, or gender identity.            Thank you!     Thank you for choosing [facility] UROLOGY AND [facility] FOR PROSTATE AND UROLOGIC CANCERS  for your care. Our goal is always to provide you with excellent care. Hearing back from our patients is one way we can continue to improve our services. Please take a few minutes to complete the written survey that you may receive in the mail after your visit with us. " Thank you!             Your Updated Medication List - Protect others around you: Learn how to safely use, store and throw away your medicines at www.disposemymeds.org.          This list is accurate as of 5/17/18 10:06 AM.  Always use your most recent med list.                   Brand Name Dispense Instructions for use Diagnosis    acetaminophen 325 MG tablet    TYLENOL    45 tablet    Take 1-2 tablets (325-650 mg) by mouth every 6 hours as needed for mild pain    Other specified causes of urethral stricture       B-D U/F 31G X 8 MM   Generic drug:  insulin pen needle      FOR ADMINISTERING INSULIN (3 HUMALOG AND 1 LANTUS)        bacitracin ointment     28 g    Apply to the incision twice daily for 7 days to prevent a wound infection    Other specified causes of urethral stricture       MARYURI CONTOUR test strip   Generic drug:  blood glucose monitoring      Dispense test strips covered by the patient insurance. Test 3 times per day.        empagliflozin 10 MG Tabs tablet    JARDIANCE     Take 10 mg by mouth        insulin lispro 100 UNIT/ML injection    HumaLOG PEN     Inject 8 units with breakfast and lunch and 10 units with supper.        LANTUS SC      Inject 70 Units Subcutaneous At Bedtime        lisinopril 10 MG tablet    PRINIVIL/ZESTRIL     Take 10 mg by mouth daily (with dinner)        loratadine 10 MG tablet    CLARITIN     Take 10 mg by mouth        metFORMIN 500 MG 24 hr tablet    GLUCOPHAGE-XR     Take 1,000 mg by mouth daily (with dinner)        MULTI COMPLETE PO           nitroFURantoin (macrocrystal-monohydrate) 100 MG capsule    MACROBID    30 capsule    Take 1 capsule (100 mg) by mouth daily Take while your catheter is in place    Other specified causes of urethral stricture       NovoLOG FLEXPEN 100 UNIT/ML injection   Generic drug:  insulin aspart           oxyCODONE IR 5 MG tablet    ROXICODONE    30 tablet    Take 1-2 tablets (5-10 mg) by mouth every 6 hours as needed for severe pain maximum 6  tablet(s) per day    Other specified causes of urethral stricture       senna-docusate 8.6-50 MG per tablet    SENOKOT-S;PERICOLACE    60 tablet    Take 2 tablets by mouth 2 times daily to prevent constipation with narcotic pain medication. Hold if you have loose stools.    Other specified causes of urethral stricture       simvastatin 20 MG tablet    ZOCOR     Take 20 mg by mouth        tadalafil 10 MG tablet    CIALIS     Take 10 mg by mouth        tolterodine 4 MG 24 hr capsule    DETROL LA    30 capsule    Take 1 capsule (4 mg) by mouth daily as needed    Other specified causes of urethral stricture

## 2018-05-17 NOTE — PROGRESS NOTES
"Urethroplasty post-operative visit:    DATE: 5/2/18  SURGEON: Dr. Herb Awad  PROCEDURE:   1. Cystoscopy.   2. Trinity of buccal mucosa graft from the left oral cavity ( 5 cm wide x 2.5 cm long).   3. Preparation of wound bed for grafting   4. Complex single stage posterior urethral reconstruction utilizing a dorsal onlay of buccal mucosa graft. (Recipient site: 5cm X 2.5cm)    HPI: Mr. Clarence Boyle is a pleasant 38 year old male who is 2 weeks s/p the above noted urethroplasty with Dr. Herb Awad for a history of bulbar urethral stricture refractory to minimally invasive management. Initial post-op course was unremarkable and he was discharged to home same day. He had been recovering well with minimal pain until this morning when he \"woke up in a pool of blood.\" He called the on-call line who arranged today's clinic visit. He describes this pool of blood as roughly 5 x 5 inches with one smaller spot as well. Also noted blood in his underwear. The blood is dark red. He is unsure if there is any active bleeding at the moment; possibly some oozing when he left the house this morning. He denies any fevers, chills, N/V. Catheter is draining well with clear, yellow urine with no hematuria or clots. He does admit to being more active yesterday than he had been previously. Still taking Detrol and Macrobid daily. He has had significant scrotal swelling (scrotum was the size of a \"softball\" at one point) which has now started to improve in the last few days.     Pain is mild. Maybe 2-3/10 - along perineal incision. Taking ibuprofen and APAP for pain.  Appetite is normal. Eating and drinking ok.  Bowel movements are normal.  Mercedes catheter in place. Urine has been clear yellow - no significant hematuria or clots.    PEx:  Blood pressure 124/86, height 1.778 m (5' 10\"), weight 120.3 kg (265 lb 4.8 oz).   GEN: well-appearing male in NAD  RESP: no increased respiratory effort  ABD: soft, NT, ND  : Moderate size scrotal " hematoma that is somewhat tense/firm to palpation, but non-tender. Purple ecchymosis noted in the suprapubic region.  Perineal incision clean, dry, intact with sutures intact. There is a pinpoint opening at the anterior-most aspect of the incision with very slow oozing of dark red blood. With applied pressure, the oozing/bleeding does not increase. The rest of the incision looks great.   No tenderness or evidence of cellulitis.   Merceeds catheter indwelling draining clear, yellow urine. No blood coming from urethral meatus.       ASSESSMENT/PLAN:  38 year old male who is 2 weeks s/p the above noted urethroplasty with a post-op scrotal hematoma.   -There is a pinpoint opening at the anterior-most aspect of the perineal incision that is now oozing very scant amounts of dark, old blood. No appreciable sites of active or new bleeding. No evidence for infection. The pinpoint incisional opening is not large enough to pack.   -At 2 weeks post-op, this new bleeding most likely represents lysis of an old hematoma rather than new bleeding.  -Images of scrotum and perineal wound sent to Dr. Awad and case was discussed with him via telephone. He agrees with the above assessment and plan below.    Plan:  -Will observe for now.  -Encouraged scrotal elevation, limiting time where patient is sitting directly on scrotum to reduce pressure, and continuing with ibuprofen and APAP PRN for pain.   -Warning signs discussed on when to call vs present to ER if bleeding worsens outside of clinic hours. Patient verbalized understanding and agreement.     He will follow up on Monday, 5/21 as planned for VCUG, catheter removal, and repeat wound check.     I have enjoyed participating in the medical care of this very pleasant patient and will continue to keep you updated on his progress.      _________________________________________________________________  Janae Juarez PA-C  Department of Urology

## 2018-05-17 NOTE — LETTER
"5/17/2018       RE: Clarence Boyle  [address]  [place] [address]     Dear Colleague,    Thank you for referring your patient, Clarence Boyle, to the [facility] UROLOGY AND [facility] FOR PROSTATE AND UROLOGIC CANCERS at [facility]. Please see a copy of my visit note below.    Urethroplasty post-operative visit:    DATE: 5/2/18  SURGEON: Dr. Herb Awad  PROCEDURE:   1. Cystoscopy.   2. [place] of buccal mucosa graft from the left oral cavity ( 5 cm wide x 2.5 cm long).   3. Preparation of wound bed for grafting   4. Complex single stage posterior urethral reconstruction utilizing a dorsal onlay of buccal mucosa graft. (Recipient site: 5cm X 2.5cm)    HPI: Mr. Clarence Boyle is a pleasant 38 year old male who is 2 weeks s/p the above noted urethroplasty with Dr. Herb Awad for a history of bulbar urethral stricture refractory to minimally invasive management. Initial post-op course was unremarkable and he was discharged to home same day. He had been recovering well with minimal pain until this morning when he \"woke up in a pool of blood.\" He called the on-call line who arranged today's clinic visit. He describes this pool of blood as roughly 5 x 5 inches with one smaller spot as well. Also noted blood in his underwear. The blood is dark red. He is unsure if there is any active bleeding at the moment; possibly some oozing when he left the house this morning. He denies any fevers, chills, N/V. Catheter is draining well with clear, yellow urine with no hematuria or clots. He does admit to being more active yesterday than he had been previously. Still taking Detrol and Macrobid daily. He has had significant scrotal swelling (scrotum was the size of a \"softball\" at one point) which has now started to improve in the last few days.     Pain is mild. Maybe 2-3/10 - along perineal incision. Taking ibuprofen and APAP for pain.  Appetite is normal. Eating and drinking ok.  Bowel movements are " "normal.  Mercedes catheter in place. Urine has been clear yellow - no significant hematuria or clots.    PEx:  Blood pressure 124/86, height 1.778 m (5' 10\"), weight 120.3 kg (265 lb 4.8 oz).   GEN: well-appearing male in NAD  RESP: no increased respiratory effort  ABD: soft, NT, ND  : Moderate size scrotal hematoma that is somewhat tense/firm to palpation, but non-tender. Purple ecchymosis noted in the suprapubic region.  Perineal incision clean, dry, intact with sutures intact. There is a pinpoint opening at the anterior-most aspect of the incision with very slow oozing of dark red blood. With applied pressure, the oozing/bleeding does not increase. The rest of the incision looks great.   No tenderness or evidence of cellulitis.   Mercedes catheter indwelling draining clear, yellow urine. No blood coming from urethral meatus.       ASSESSMENT/PLAN:  38 year old male who is 2 weeks s/p the above noted urethroplasty with a post-op scrotal hematoma.   -There is a pinpoint opening at the anterior-most aspect of the perineal incision that is now oozing very scant amounts of dark, old blood. No appreciable sites of active or new bleeding. No evidence for infection. The pinpoint incisional opening is not large enough to pack.   -At 2 weeks post-op, this new bleeding most likely represents lysis of an old hematoma rather than new bleeding.  -Images of scrotum and perineal wound sent to Dr. Awad and case was discussed with him via telephone. He agrees with the above assessment and plan below.    Plan:  -Will observe for now.  -Encouraged scrotal elevation, limiting time where patient is sitting directly on scrotum to reduce pressure, and continuing with ibuprofen and APAP PRN for pain.   -Warning signs discussed on when to call vs present to ER if bleeding worsens outside of clinic hours. Patient verbalized understanding and agreement.     He will follow up on Monday, 5/21 as planned for VCUG, catheter removal, and repeat " wound check.     I have enjoyed participating in the medical care of this very pleasant patient and will continue to keep you updated on his progress.      _________________________________________________________________  Janae Juarez PA-C  Department of Urology

## 2018-05-21 ENCOUNTER — HOSPITAL ENCOUNTER (OUTPATIENT)
Dept: GENERAL RADIOLOGY | Facility: CLINIC | Age: 38
Discharge: HOME | End: 2018-05-21
Attending: UROLOGY | Admitting: UROLOGY
Payer: COMMERCIAL

## 2018-05-21 ENCOUNTER — RECORDS - HEALTHEAST (OUTPATIENT)
Dept: ADMINISTRATIVE | Facility: OTHER | Age: 38
End: 2018-05-21

## 2018-05-21 ENCOUNTER — OFFICE VISIT (OUTPATIENT)
Dept: UROLOGY | Facility: CLINIC | Age: 38
End: 2018-05-21
Payer: COMMERCIAL

## 2018-05-21 ENCOUNTER — TELEPHONE (OUTPATIENT)
Dept: UROLOGY | Facility: CLINIC | Age: 38
End: 2018-05-21

## 2018-05-21 VITALS
BODY MASS INDEX: 37.65 KG/M2 | HEART RATE: 113 BPM | DIASTOLIC BLOOD PRESSURE: 78 MMHG | WEIGHT: 263 LBS | HEIGHT: 70 IN | SYSTOLIC BLOOD PRESSURE: 117 MMHG

## 2018-05-21 DIAGNOSIS — S30.22XA SCROTAL HEMATOMA: ICD-10-CM

## 2018-05-21 DIAGNOSIS — N35.919 URETHRAL STRICTURE: ICD-10-CM

## 2018-05-21 DIAGNOSIS — N35.912 BULBOUS URETHRAL STRICTURE: Primary | ICD-10-CM

## 2018-05-21 PROCEDURE — 51600 INJECTION FOR BLADDER X-RAY: CPT

## 2018-05-21 PROCEDURE — 25500064 ZZH RX 255 OP 636: Performed by: RADIOLOGY

## 2018-05-21 RX ADMIN — DIATRIZOATE MEGLUMINE 150 ML: 180 INJECTION, SOLUTION INTRAVESICAL at 14:30

## 2018-05-21 ASSESSMENT — PAIN SCALES - GENERAL: PAINLEVEL: NO PAIN (0)

## 2018-05-21 NOTE — PATIENT INSTRUCTIONS
UROLOGY CLINIC VISIT PATIENT INSTRUCTIONS    -You may stop all antibiotics at this time.  - No sexual intercourse x 6 weeks after surgery.  Bulbospongiosus muscles divided and need time to heal.  - Some burning with urination and blood in the urine are normal the first week following catheter removal, but should resolve. Drink plenty of water.  - Physical activity as tolerated but do not lift >10 lbs for another 2 weeks.  If it is uncomfortable or painful, you should not do it, ie - bike riding, lunges, etc. Avoid straddle activities (biking, motorcycle, horse riding, tractor work etc) for another 3 months and in general should avoid these activities.   -As much as possible, try to keep pressure off of your scrotum to allow the hematoma (collection of blood) to continue healing. You can expect some drainage for another 1-2 weeks. The swelling may take up to 1 month to resolve.       Follow up with Dr. Alvin Hopper on Monday, 6/4 for another wound check.  Follow up with Dr. Awad 3 months after surgery for a cystoscopy (scope to look into the bladder).     CYSTOSCOPY    What is a Cystoscopy?  This is a procedure done to check for problems inside the bladder.  Problems may include polyps (growths), tumors, inflammation (swelling and redness) and other concerns.    The doctor inserts a thin tube (called a cystoscope) into the bladder.  The tube is about the size of a pencil.  We will give you numbing medicine to reduce the pain or discomfort you may feel.    The tube allows the doctor to:  The doctor will be able to see inside the bladder by filling the bladder with water.  The water makes it easier to see any problems that may be present.    If needed, the doctor may use the tube to:  The doctor is able to take tissue samples (biopsies).  Samples are sent to the lab for testing.  The doctor can also burn off any small growths or tumors that are found.  This is call fulguration.    How should I get ready for the  exam?  To prepare, stop taking any medications as instructed. Ask whether you should avoid eating or drinking anything after midnight before the procedure. Follow any other instructions your doctor gives you.    If you are having this procedure done at the clinic, you will be there for up to an hour.  You will receive care before and after the procedure.    Please tell your doctor if:  - You have a history of urinary tract infections.  - You know that you have a tumor in your bladder.  - You have bleeding problems.  - You have any allergies.  - You are or may be pregnant.      What happens after the exam?  You may go back to your normal diet and activity as you feel ready, unless your doctor tells you not to.    For the next two days, you may notice:  - Some blood in your urine.  - Some burning when you urinate (use the toilet).  - An urge to urinate more often.  - Bladder spasms.    These are normal after the procedure. They should go away on their own after a day or two.      - You can help to relieve the above listed symptoms by:  - Drinking 6 to 8 large glasses of water each day (includes drinks at meals).  This will help clear the urine.  - Take warm baths to relieve pain and bladder spasms.  Do not add anything to the bath water.  - Your doctor may prescribe pain medicine.  You may also take Tylenol (acetaminophen) for pain.    When should I call my doctor?  - A fever over 100.0 F (38 C) for more than a day.  (Before you call the doctor, check your temperature under your tongue.)  - Chills.  - Failure to urinate: No urine comes out when you try to use the toilet.  (Try soaking in a bathtub full of warm water.  If still no urine, call your doctor.)  - A lot of blood in the urine or blood clots larger than a nickel.  - Pain in the back or abdomen (belly / stomach area).  - Pain or spasms that are not relieved by warm tub baths and pain medicine.  - Severe pain, burning or other problems while passing  urine.  - Pain that gets worse after two days.        If you have any issues, questions or concerns in the meantime, do not hesitate to contact us at [phone] or via [employer].     It was a pleasure meeting with you today.  Thank you for allowing me and my team the privilege of caring for you today.  YOU are the reason we are here, and I truly hope we provided you with the excellent service you deserve.  Please let us know if there is anything else we can do for you so that we can be sure you are leaving completely satisfied with your care experience.

## 2018-05-21 NOTE — NURSING NOTE
Chief Complaint   Patient presents with     RECHECK     proximal to mid  Bulbar urethral stricture (3cm).       Annabelle Contreras MA

## 2018-05-21 NOTE — TELEPHONE ENCOUNTER
[facility] Call Center    Phone Message    May a detailed message be left on voicemail: yes    Reason for Call: Other: Pt requesting a call back from the clinic to discuss his imaging appt today and if he should indeed have it done even though he still has his cath and is bleeding from his incision sight like his was in his visist last Thursday. Please call back pt before 2pm to discuss what he should do about the appt. Thanks     Action Taken: Message routed to:  [facility] ([facility] Uro

## 2018-05-21 NOTE — MR AVS SNAPSHOT
After Visit Summary   5/21/2018    Clarence Boyle    MRN: [id number]           Patient Information     Date Of Birth          1980        Visit Information        Provider Department      5/21/2018 4:30 PM Janae Juarez PA-C [facility] and [facility] for Prostate and Urologic Cancers        Today's Diagnoses     Bulbous urethral stricture    -  1    Scrotal hematoma          Care Instructions    UROLOGY CLINIC VISIT PATIENT INSTRUCTIONS    -You may stop all antibiotics at this time.  - No sexual intercourse x 6 weeks after surgery.  Bulbospongiosus muscles divided and need time to heal.  - Some burning with urination and blood in the urine are normal the first week following catheter removal, but should resolve. Drink plenty of water.  - Physical activity as tolerated but do not lift >10 lbs for another 2 weeks.  If it is uncomfortable or painful, you should not do it, ie - bike riding, lunges, etc. Avoid straddle activities (biking, motorcycle, horse riding, tractor work etc) for another 3 months and in general should avoid these activities.   -As much as possible, try to keep pressure off of your scrotum to allow the hematoma (collection of blood) to continue healing. You can expect some drainage for another 1-2 weeks. The swelling may take up to 1 month to resolve.       Follow up with Dr. Alvin Hopper on Monday, 6/4 for another wound check.  Follow up with Dr. Awad 3 months after surgery for a cystoscopy (scope to look into the bladder).     CYSTOSCOPY    What is a Cystoscopy?  This is a procedure done to check for problems inside the bladder.  Problems may include polyps (growths), tumors, inflammation (swelling and redness) and other concerns.    The doctor inserts a thin tube (called a cystoscope) into the bladder.  The tube is about the size of a pencil.  We will give you numbing medicine to reduce the pain or discomfort you may feel.    The tube allows the doctor to:  The doctor will  be able to see inside the bladder by filling the bladder with water.  The water makes it easier to see any problems that may be present.    If needed, the doctor may use the tube to:  The doctor is able to take tissue samples (biopsies).  Samples are sent to the lab for testing.  The doctor can also burn off any small growths or tumors that are found.  This is call fulguration.    How should I get ready for the exam?  To prepare, stop taking any medications as instructed. Ask whether you should avoid eating or drinking anything after midnight before the procedure. Follow any other instructions your doctor gives you.    If you are having this procedure done at the clinic, you will be there for up to an hour.  You will receive care before and after the procedure.    Please tell your doctor if:  - You have a history of urinary tract infections.  - You know that you have a tumor in your bladder.  - You have bleeding problems.  - You have any allergies.  - You are or may be pregnant.      What happens after the exam?  You may go back to your normal diet and activity as you feel ready, unless your doctor tells you not to.    For the next two days, you may notice:  - Some blood in your urine.  - Some burning when you urinate (use the toilet).  - An urge to urinate more often.  - Bladder spasms.    These are normal after the procedure. They should go away on their own after a day or two.      - You can help to relieve the above listed symptoms by:  - Drinking 6 to 8 large glasses of water each day (includes drinks at meals).  This will help clear the urine.  - Take warm baths to relieve pain and bladder spasms.  Do not add anything to the bath water.  - Your doctor may prescribe pain medicine.  You may also take Tylenol (acetaminophen) for pain.    When should I call my doctor?  - A fever over 100.0 F (38 C) for more than a day.  (Before you call the doctor, check your temperature under your tongue.)  - Chills.  - Failure  to urinate: No urine comes out when you try to use the toilet.  (Try soaking in a bathtub full of warm water.  If still no urine, call your doctor.)  - A lot of blood in the urine or blood clots larger than a nickel.  - Pain in the back or abdomen (belly / stomach area).  - Pain or spasms that are not relieved by warm tub baths and pain medicine.  - Severe pain, burning or other problems while passing urine.  - Pain that gets worse after two days.        If you have any issues, questions or concerns in the meantime, do not hesitate to contact us at [phone] or via [employer].     It was a pleasure meeting with you today.  Thank you for allowing me and my team the privilege of caring for you today.  YOU are the reason we are here, and I truly hope we provided you with the excellent service you deserve.  Please let us know if there is anything else we can do for you so that we can be sure you are leaving completely satisfied with your care experience.                Follow-ups after your visit        Your next 10 appointments already scheduled     Jun 04, 2018  3:00 PM CDT   (Arrive by 2:45 PM)   Return Visit with Jaron Hopper MD   [facility] and [facility] for Prostate and Urologic Cancers ([facility])    [address] [place] [id number] [phone]            Aug 06, 2018  3:30 PM CDT   (Arrive by 3:15 PM)   Cystoscopy with Herb Awad MD   [facility] and [facility] for Prostate and Urologic Cancers ([facility])    [address] [place] [id number] [phone]              Who to contact     Please call your clinic at [phone] to:    Ask questions about your health    Make or cancel appointments    Discuss your medicines    Learn about your test results    Speak to your doctor            Additional Information About Your Visit        That's [employer] Information     [employer] is an electronic gateway  "that provides easy, online access to your medical records. With [employer], you can request a clinic appointment, read your test results, renew a prescription or communicate with your care team.     To sign up for [employer] visit the website at [email or web address]   You will be asked to enter the access code listed below, as well as some personal information. Please follow the directions to create your username and password.     Your access code is: [email or web address]  Expires: 2018  6:31 AM     Your access code will  in 90 days. If you need help or a new code, please contact your [facility] or call [phone] for assistance.        Care EveryWhere ID     This is your Care EveryWhere ID. This could be used by other organizations to access your [facility] medical records  [id number]        Your Vitals Were     Pulse Height BMI (Body Mass Index)             113 1.778 m (5' 10\") 37.74 kg/m2          Blood Pressure from Last 3 Encounters:   18 117/78   18 124/86   18 129/83    Weight from Last 3 Encounters:   18 119.3 kg (263 lb)   18 120.3 kg (265 lb 4.8 oz)   18 120.7 kg (266 lb)              Today, you had the following     No orders found for display       Primary Care Provider Office Phone # Fax #    Marcos ARMANI Jo [phone]       [facility] [address] [place] [id number]        Equal Access to Services     ABNER TERRY AH: Hadii dominik ku hadasho Soomaali, waaxda luqadaha, qaybta kaalmada adeegyada, abbe dave. So [facility] [phone].    ATENCIÓN: Si habla español, tiene a he disposición servicios gratuitos de asistencia lingüística. Llame al [phone].    We comply with applicable federal civil rights laws and [place] laws. We do not discriminate on the basis of race, color, national origin, age, disability, sex, sexual orientation, or gender identity.            Thank " you!     Thank you for choosing [facility] UROLOGY AND [facility] FOR PROSTATE AND UROLOGIC CANCERS  for your care. Our goal is always to provide you with excellent care. Hearing back from our patients is one way we can continue to improve our services. Please take a few minutes to complete the written survey that you may receive in the mail after your visit with us. Thank you!             Your Updated Medication List - Protect others around you: Learn how to safely use, store and throw away your medicines at www.disposemymeds.org.          This list is accurate as of 5/21/18  4:47 PM.  Always use your most recent med list.                   Brand Name Dispense Instructions for use Diagnosis    acetaminophen 325 MG tablet    TYLENOL    45 tablet    Take 1-2 tablets (325-650 mg) by mouth every 6 hours as needed for mild pain    Other specified causes of urethral stricture       B-D U/F 31G X 8 MM   Generic drug:  insulin pen needle      FOR ADMINISTERING INSULIN (3 HUMALOG AND 1 LANTUS)        bacitracin ointment     28 g    Apply to the incision twice daily for 7 days to prevent a wound infection    Other specified causes of urethral stricture       MARYURI CONTOUR test strip   Generic drug:  blood glucose monitoring      Dispense test strips covered by the patient insurance. Test 3 times per day.        empagliflozin 10 MG Tabs tablet    JARDIANCE     Take 10 mg by mouth        insulin lispro 100 UNIT/ML injection    HumaLOG PEN     Inject 8 units with breakfast and lunch and 10 units with supper.        LANTUS SC      Inject 70 Units Subcutaneous At Bedtime        lisinopril 10 MG tablet    PRINIVIL/ZESTRIL     Take 10 mg by mouth daily (with dinner)        loratadine 10 MG tablet    CLARITIN     Take 10 mg by mouth        metFORMIN 500 MG 24 hr tablet    GLUCOPHAGE-XR     Take 1,000 mg by mouth daily (with dinner)        MULTI COMPLETE PO           nitroFURantoin (macrocrystal-monohydrate) 100 MG capsule    MACROBID    30  capsule    Take 1 capsule (100 mg) by mouth daily Take while your catheter is in place    Other specified causes of urethral stricture       NovoLOG FLEXPEN 100 UNIT/ML injection   Generic drug:  insulin aspart           oxyCODONE IR 5 MG tablet    ROXICODONE    30 tablet    Take 1-2 tablets (5-10 mg) by mouth every 6 hours as needed for severe pain maximum 6 tablet(s) per day    Other specified causes of urethral stricture       senna-docusate 8.6-50 MG per tablet    SENOKOT-S;PERICOLACE    60 tablet    Take 2 tablets by mouth 2 times daily to prevent constipation with narcotic pain medication. Hold if you have loose stools.    Other specified causes of urethral stricture       simvastatin 20 MG tablet    ZOCOR     Take 20 mg by mouth        tadalafil 10 MG tablet    CIALIS     Take 10 mg by mouth        tolterodine 4 MG 24 hr capsule    DETROL LA    30 capsule    Take 1 capsule (4 mg) by mouth daily as needed    Other specified causes of urethral stricture

## 2018-05-21 NOTE — PROGRESS NOTES
"Urethroplasty post-operative visit:    DATE: 5/2/18  SURGEON: Dr. Herb Awad  PROCEDURE:   1. Cystoscopy.   2. [place] of buccal mucosa graft from the left oral cavity ( 5 cm wide x 2.5 cm long).   3. Preparation of wound bed for grafting   4. Complex single stage posterior urethral reconstruction utilizing a dorsal onlay of buccal mucosa graft. (Recipient site: 5cm X 2.5cm)    HPI: Mr. Clarence Boyle is a pleasant 38 year old male who is 2.5 weeks s/p the above noted urethroplasty with Dr. Herb Awad for a history of bulbar urethral stricture refractory to minimally invasive management. Initial post-op course was unremarkable and he was discharged to home same day. He had been recovering well with minimal pain until the morning of 5/17/18 when he \"woke up in a pool of blood.\" He called the on-call line who arranged a clinic visit with me later that morning. He described this pool of blood as roughly 5 x 5 inches with one smaller spot as well. Also noted blood in his underwear. The blood was dark red. He denied any fevers, chills, N/V. Catheter was draining well with clear, yellow urine with no hematuria or clots. He did admit to being more active the day prior than he had been previously. He also reported significant scrotal swelling (scrotum was the size of a \"softball\" at one point) which had started to improve over the few days preceding his appointment on 5/17/18. On exam, he was noted to have a moderate size scrotal hematoma and bruising in the suprapubic region. There was also a pinpoint opening at the anterior-most aspect of the perineal incision with very slow oozing of dark red blood noted. No evidence for cellulitis. The case was discussed with Dr. Awad who recommended conservative management. Patient was sent home with instructions to elevate the scrotum and minimize direct pressure on the scrotum/perineum as much as possible.    He returns today for catheter removal in conjunction with VCUG and " "another wound check. Continues to have minimal, intermittent blood-tinged drainage but overall this seems to be improving. The scrotal swelling is also slightly improved but still bothersome and somewhat painful. He continues to deny fevers, chills, N/V, gross hematuria.     Pain is mild.  Appetite is normal. Eating and drinking ok.  Bowel movements are normal.  Mercedes catheter has remained in place until today's radiology appointment.  Urine has been clear yellow.    PEx:  Blood pressure 117/78, pulse 113, height 1.778 m (5' 10\"), weight 119.3 kg (263 lb).   GEN: well-appearing male in NAD  RESP: no increased respiratory effort  ABD: soft, NT, ND  : Moderate size scrotal hematoma that is mildly tender to palpation but somewhat improved from last check on 5/17/18. Purple ecchymosis noted in the suprapubic region which is significantly improved from last check on 5/17/18.  Perineal incision clean, dry, intact with sutures intact. There is a tiny pinpoint opening at the anterior-most aspect of the incision with no active bleeding or drainage noted today. The rest of the incision looks great.   No tenderness or evidence of cellulitis.     IMAGING  VCUG 5/21/18 (post-op):   IMPRESSION:   1. Postoperative changes of ureteroplasty without significant residual  stricture. Mild narrowing of the penile urethra may represent normal  postoperative change/edema.  2. Near the bulbar urethra, there is an 8 mm tubular area which fills  with contrast and fully clears with voiding. This could represent a  small false channel in the urethral wall related to patient's  urethrotomy.        RUG  4/16/18 (pre-op):        ASSESSMENT/PLAN:  - Excellent outcome after urethroplasty.  - Post-op scrotal hematoma continues to resolve. No evidence for infection. Patient would prefer to have one more wound check in another 1-2 weeks. Will schedule with Dr. Hopper who has appointment openings on 6/4/18.  - Encouraged scrotal elevation, limiting " time where patient is sitting directly on scrotum to reduce pressure, and continuing with ibuprofen and APAP PRN for pain.   - One cipro 500 mg tablet taken this morning prior to imaging appointment.    - No sexual intercourse x 6 weeks after surgery.  Bulbospongiosus muscles divided and need time to heal.  - Some dysuria and hematuria are normal the first week following catheter removal, but should resolve.   - Physical activity as tolerated but do not lift >10 lbs for another 2 weeks.  If it is uncomfortable or painful, Mr. Boyle should not do it, ie - bike riding, lunges, etc. He is to avoid straddle activities (biking, motorcycle, horse riding, tractor work etc) for another 3 months and in general should avoid these activities.   - He understands that should he require catheterization in the future, to make the performing provider aware of his urethral reconstruction.    Follow Up:   -2 weeks for one more wound check for monitoring of post-op scrotal hematoma with mild drainage per perineal incision.  -3 months with Dr. Awad for uroflow, PVR, and cystourethroscopy.    I have enjoyed participating in the medical care of this very pleasant patient and will continue to keep you updated on his progress.      _________________________________________________________________  Janae Juarez PA-C  Department of Urology

## 2018-05-21 NOTE — TELEPHONE ENCOUNTER
Patient called again and left message to go ahead with imaging.  Spoke to Simeon and she agreed. Anisa Hernández LPN Staff Nurse

## 2018-05-21 NOTE — LETTER
"5/21/2018       RE: Clarence Boyle  [address]  [place] [address]     Dear Colleague,    Thank you for referring your patient, Clarence Boyle, to the [facility] UROLOGY AND [facility] FOR PROSTATE AND UROLOGIC CANCERS at [facility]. Please see a copy of my visit note below.    Urethroplasty post-operative visit:    DATE: 5/2/18  SURGEON: Dr. Herb Awad  PROCEDURE:   1. Cystoscopy.   2. [place] of buccal mucosa graft from the left oral cavity ( 5 cm wide x 2.5 cm long).   3. Preparation of wound bed for grafting   4. Complex single stage posterior urethral reconstruction utilizing a dorsal onlay of buccal mucosa graft. (Recipient site: 5cm X 2.5cm)    HPI: Mr. Clarence Boyle is a pleasant 38 year old male who is 2.5 weeks s/p the above noted urethroplasty with Dr. Herb Awad for a history of bulbar urethral stricture refractory to minimally invasive management. Initial post-op course was unremarkable and he was discharged to home same day. He had been recovering well with minimal pain until the morning of 5/17/18 when he \"woke up in a pool of blood.\" He called the on-call line who arranged a clinic visit with me later that morning. He described this pool of blood as roughly 5 x 5 inches with one smaller spot as well. Also noted blood in his underwear. The blood was dark red. He denied any fevers, chills, N/V. Catheter was draining well with clear, yellow urine with no hematuria or clots. He did admit to being more active the day prior than he had been previously. He also reported significant scrotal swelling (scrotum was the size of a \"softball\" at one point) which had started to improve over the few days preceding his appointment on 5/17/18. On exam, he was noted to have a moderate size scrotal hematoma and bruising in the suprapubic region. There was also a pinpoint opening at the anterior-most aspect of the perineal incision with very slow oozing of dark red blood noted. No evidence for " "cellulitis. The case was discussed with Dr. Awad who recommended conservative management. Patient was sent home with instructions to elevate the scrotum and minimize direct pressure on the scrotum/perineum as much as possible.    He returns today for catheter removal in conjunction with VCUG and another wound check. Continues to have minimal, intermittent blood-tinged drainage but overall this seems to be improving. The scrotal swelling is also slightly improved but still bothersome and somewhat painful. He continues to deny fevers, chills, N/V, gross hematuria.     Pain is mild.  Appetite is normal. Eating and drinking ok.  Bowel movements are normal.  Mercedes catheter has remained in place until today's radiology appointment.  Urine has been clear yellow.    PEx:  Blood pressure 117/78, pulse 113, height 1.778 m (5' 10\"), weight 119.3 kg (263 lb).   GEN: well-appearing male in NAD  RESP: no increased respiratory effort  ABD: soft, NT, ND  : Moderate size scrotal hematoma that is mildly tender to palpation but somewhat improved from last check on 5/17/18. Purple ecchymosis noted in the suprapubic region which is significantly improved from last check on 5/17/18.  Perineal incision clean, dry, intact with sutures intact. There is a tiny pinpoint opening at the anterior-most aspect of the incision with no active bleeding or drainage noted today. The rest of the incision looks great.   No tenderness or evidence of cellulitis.     IMAGING  VCUG 5/21/18 (post-op):   IMPRESSION:   1. Postoperative changes of ureteroplasty without significant residual  stricture. Mild narrowing of the penile urethra may represent normal  postoperative change/edema.  2. Near the bulbar urethra, there is an 8 mm tubular area which fills  with contrast and fully clears with voiding. This could represent a  small false channel in the urethral wall related to patient's  urethrotomy.        RUG  4/16/18 (pre-op):      ASSESSMENT/PLAN:  - " Excellent outcome after urethroplasty.  - Post-op scrotal hematoma continues to resolve. No evidence for infection. Patient would prefer to have one more wound check in another 1-2 weeks. Will schedule with Dr. Hopper who has appointment openings on 6/4/18.  - Encouraged scrotal elevation, limiting time where patient is sitting directly on scrotum to reduce pressure, and continuing with ibuprofen and APAP PRN for pain.   - One cipro 500 mg tablet taken this morning prior to imaging appointment.    - No sexual intercourse x 6 weeks after surgery.  Bulbospongiosus muscles divided and need time to heal.  - Some dysuria and hematuria are normal the first week following catheter removal, but should resolve.   - Physical activity as tolerated but do not lift >10 lbs for another 2 weeks.  If it is uncomfortable or painful, Mr. Boyle should not do it, ie - bike riding, lunges, etc. He is to avoid straddle activities (biking, motorcycle, horse riding, tractor work etc) for another 3 months and in general should avoid these activities.   - He understands that should he require catheterization in the future, to make the performing provider aware of his urethral reconstruction.    Follow Up:   -2 weeks for one more wound check for monitoring of post-op scrotal hematoma with mild drainage per perineal incision.  -3 months with Dr. Awad for uroflow, PVR, and cystourethroscopy.    I have enjoyed participating in the medical care of this very pleasant patient and will continue to keep you updated on his progress.      _________________________________________________________________  Janae Juarez PA-C  Department of Urology

## 2018-05-22 ENCOUNTER — PRE VISIT (OUTPATIENT)
Dept: UROLOGY | Facility: CLINIC | Age: 38
End: 2018-05-22

## 2018-05-22 NOTE — TELEPHONE ENCOUNTER
Patient with history of post-op scrotal hematoma with mild drainage per perineal incision coming in for wound check. Patient chart reviewed, no need for call, all records available and ready for appointment.

## 2018-05-25 DIAGNOSIS — R30.0 DYSURIA: ICD-10-CM

## 2018-05-25 DIAGNOSIS — R30.0 DYSURIA: Primary | ICD-10-CM

## 2018-05-25 LAB
ALBUMIN UR-MCNC: 30 MG/DL
APPEARANCE UR: CLEAR
BILIRUB UR QL STRIP: NEGATIVE
COLOR UR AUTO: YELLOW
GLUCOSE UR STRIP-MCNC: >=1000 MG/DL
HGB UR QL STRIP: ABNORMAL
KETONES UR STRIP-MCNC: NEGATIVE MG/DL
LEUKOCYTE ESTERASE UR QL STRIP: NEGATIVE
MUCOUS THREADS #/AREA URNS LPF: PRESENT /LPF
NITRATE UR QL: NEGATIVE
PH UR STRIP: 5 PH (ref 5–7)
RBC #/AREA URNS AUTO: ABNORMAL /HPF
SOURCE: ABNORMAL
SP GR UR STRIP: >1.03 (ref 1–1.03)
UROBILINOGEN UR STRIP-ACNC: 0.2 EU/DL (ref 0.2–1)
WBC #/AREA URNS AUTO: ABNORMAL /HPF

## 2018-05-25 PROCEDURE — 81001 URINALYSIS AUTO W/SCOPE: CPT | Performed by: PHYSICIAN ASSISTANT

## 2018-05-25 PROCEDURE — 87086 URINE CULTURE/COLONY COUNT: CPT | Performed by: PHYSICIAN ASSISTANT

## 2018-05-26 LAB
BACTERIA SPEC CULT: NO GROWTH
SPECIMEN SOURCE: NORMAL

## 2018-06-02 ENCOUNTER — COMMUNICATION - HEALTHEAST (OUTPATIENT)
Dept: ENDOCRINOLOGY | Facility: CLINIC | Age: 38
End: 2018-06-02

## 2018-06-02 DIAGNOSIS — E11.9 DIABETES MELLITUS TYPE 2, UNCOMPLICATED (H): ICD-10-CM

## 2018-06-04 ENCOUNTER — OFFICE VISIT (OUTPATIENT)
Dept: UROLOGY | Facility: CLINIC | Age: 38
End: 2018-06-04
Payer: COMMERCIAL

## 2018-06-04 ENCOUNTER — RECORDS - HEALTHEAST (OUTPATIENT)
Dept: ADMINISTRATIVE | Facility: OTHER | Age: 38
End: 2018-06-04

## 2018-06-04 VITALS
BODY MASS INDEX: 37.65 KG/M2 | DIASTOLIC BLOOD PRESSURE: 80 MMHG | HEART RATE: 100 BPM | HEIGHT: 70 IN | SYSTOLIC BLOOD PRESSURE: 126 MMHG | WEIGHT: 263 LBS

## 2018-06-04 ASSESSMENT — PAIN SCALES - GENERAL: PAINLEVEL: NO PAIN (0)

## 2018-06-04 NOTE — NURSING NOTE
"Chief Complaint   Patient presents with     RECHECK     wound check       Blood pressure 126/80, pulse 100, height 1.778 m (5' 10\"), weight 119.3 kg (263 lb). Body mass index is 37.74 kg/(m^2).    Patient Active Problem List   Diagnosis     Urethral stricture       Allergies   Allergen Reactions     Liraglutide      Stomach upset, vomiting      Penicillins        Current Outpatient Prescriptions   Medication Sig Dispense Refill     acetaminophen (TYLENOL) 325 MG tablet Take 1-2 tablets (325-650 mg) by mouth every 6 hours as needed for mild pain 45 tablet 0     bacitracin ointment Apply to the incision twice daily for 7 days to prevent a wound infection 28 g 0     blood glucose monitoring (MARYURI CONTOUR) test strip Dispense test strips covered by the patient insurance. Test 3 times per day.       empagliflozin (JARDIANCE) 10 MG TABS tablet Take 10 mg by mouth       insulin aspart (NOVOLOG FLEXPEN) 100 UNIT/ML injection        Insulin Glargine (LANTUS SC) Inject 70 Units Subcutaneous At Bedtime       insulin lispro (HUMALOG PEN) 100 UNIT/ML injection Inject 8 units with breakfast and lunch and 10 units with supper.       insulin pen needle (B-D U/F) 31G X 8 MM FOR ADMINISTERING INSULIN (3 HUMALOG AND 1 LANTUS)       lisinopril (PRINIVIL/ZESTRIL) 10 MG tablet Take 10 mg by mouth daily (with dinner)        loratadine (CLARITIN) 10 MG tablet Take 10 mg by mouth       metFORMIN (GLUCOPHAGE-XR) 500 MG 24 hr tablet Take 1,000 mg by mouth daily (with dinner)        Multiple Vitamins-Minerals (MULTI COMPLETE PO)        nitroFURantoin, macrocrystal-monohydrate, (MACROBID) 100 MG capsule Take 1 capsule (100 mg) by mouth daily Take while your catheter is in place 30 capsule 0     oxyCODONE IR (ROXICODONE) 5 MG tablet Take 1-2 tablets (5-10 mg) by mouth every 6 hours as needed for severe pain maximum 6 tablet(s) per day 30 tablet 0     senna-docusate (SENOKOT-S;PERICOLACE) 8.6-50 MG per tablet Take 2 tablets by mouth 2 times " daily to prevent constipation with narcotic pain medication. Hold if you have loose stools. 60 tablet 1     simvastatin (ZOCOR) 20 MG tablet Take 20 mg by mouth       tadalafil (CIALIS) 10 MG tablet Take 10 mg by mouth       tolterodine (DETROL LA) 4 MG 24 hr capsule Take 1 capsule (4 mg) by mouth daily as needed 30 capsule 0       Social History   Substance Use Topics     Smoking status: Never Smoker     Smokeless tobacco: Never Used     Alcohol use Yes      Comment: 2-3/month       Virginia Clark LPN  6/4/2018  2:49 PM

## 2018-06-04 NOTE — LETTER
"6/4/2018       RE: Clarence Boyle  [address]  [place] [address]     Dear Colleague,    Thank you for referring your patient, Clarence Boyle, to the [facility] UROLOGY AND [facility] FOR PROSTATE AND UROLOGIC CANCERS at [facility]. Please see a copy of my visit note below.    Urology Postop Note  Clarence Boyle is a 38 year old male who is 4 weeks s/p dorsal onlay buccal urethroplasty for bulbar urethral stricture.    His postoperative course was complicated by a scrotal hematoma which has caused some pain. He also has a minor wound breakdown at the anterior most aspect. The finding is now subtle. The hematoma has been managed conservatively as there is fever, cellulitis or other signs of infection.    The hematoma is improving slowly.     He was last seen by Janae Juarez PA-C on 5/21/2018.     /80  Pulse 100  Ht 1.778 m (5' 10\")  Wt 119.3 kg (263 lb)  BMI 37.74 kg/m2    Exam:  Incision nearly closed with small aspect of anterior aspect of wound with very small opening.  No tenderness or evidence of cellulitis  Moderate scrotal hematoma localized over left hemiscrotum. Not tender or tense. Testicles palpable bilaterally.    A/P   Doing well after buccal urethroplasty.  -He experienced a scrotal hematoma which was managed conservatively.  -He also has some drainage from his perineal incision, but now closing.  -This should heal without further intervention though I discussed with patient that scrotal hematomas often take weeks to months to completely resolve. No sign of infection, and we do not recommend any invasive treatment.  -Will plan on followup 3 months after surgery with Dr. Awad for uroflow, PVR, and cystoscopy.    Jaron Hopper MD    "

## 2018-06-04 NOTE — MR AVS SNAPSHOT
After Visit Summary   6/4/2018    Clarence Boyle    MRN: [id number]           Patient Information     Date Of Birth          1980        Visit Information        Provider Department      6/4/2018 3:00 PM Jaron Hopper MD [facility] Urology and [facility] for Prostate and Urologic Cancers        Today's Diagnoses     Other specified causes of urethral stricture    -  1       Follow-ups after your visit        Your next 10 appointments already scheduled     Aug 06, 2018  3:30 PM CDT   (Arrive by 3:15 PM)   Cystoscopy with Herb Awad MD   [facility] Urology and [facility] for Prostate and Urologic Cancers ([facility])    [address]  [address] [facility] [address] [phone]              Who to contact     Please call your clinic at [phone] to:    Ask questions about your health    Make or cancel appointments    Discuss your medicines    Learn about your test results    Speak to your doctor            Additional Information About Your Visit        My[employer] Information     [employer] gives you secure access to your electronic health record. If you see a primary care provider, you can also send messages to your care team and make appointments. If you have questions, please call your primary care clinic.  If you do not have a primary care provider, please call [phone] and they will assist you.      [employer] is an electronic gateway that provides easy, online access to your medical records. With [employer], you can request a clinic appointment, read your test results, renew a prescription or communicate with your care team.     To access your existing account, please contact your [facility] or call [phone] for assistance.        Care EveryWhere ID     This is your Care EveryWhere ID. This could be used by other organizations to access your [place] medical records  [id number]        Your Vitals Were     Pulse Height BMI (Body  "Mass Index)             100 1.778 m (5' 10\") 37.74 kg/m2          Blood Pressure from Last 3 Encounters:   06/04/18 126/80   05/21/18 117/78   05/17/18 124/86    Weight from Last 3 Encounters:   06/04/18 119.3 kg (263 lb)   05/21/18 119.3 kg (263 lb)   05/17/18 120.3 kg (265 lb 4.8 oz)              Today, you had the following     No orders found for display       Primary Care Provider Office Phone # Fax #    Marcos LOMELI Do Brar,  [phone]       [facility] [address] [address]        Equal Access to Services     ABNER TERRY : Hadii aad ku hadasho Somerly, waaxda luqadaha, qaybta kaalmada adeegyada, abbe ledesma . So [facility] [phone].    ATENCIÓN: Si habla español, tiene a he disposición servicios gratuitos de asistencia lingüística. Llame al [phone].    We comply with applicable federal civil rights laws and [place] laws. We do not discriminate on the basis of race, color, national origin, age, disability, sex, sexual orientation, or gender identity.            Thank you!     Thank you for choosing [facility] UROLOGY AND [facility] FOR PROSTATE AND UROLOGIC CANCERS  for your care. Our goal is always to provide you with excellent care. Hearing back from our patients is one way we can continue to improve our services. Please take a few minutes to complete the written survey that you may receive in the mail after your visit with us. Thank you!             Your Updated Medication List - Protect others around you: Learn how to safely use, store and throw away your medicines at www.disposemymeds.org.          This list is accurate as of 6/4/18  9:52 PM.  Always use your most recent med list.                   Brand Name Dispense Instructions for use Diagnosis    acetaminophen 325 MG tablet    TYLENOL    45 tablet    Take 1-2 tablets (325-650 mg) by mouth every 6 hours as needed for mild pain    Other specified causes of urethral stricture       B-D " U/F 31G X 8 MM   Generic drug:  insulin pen needle      FOR ADMINISTERING INSULIN (3 HUMALOG AND 1 LANTUS)        bacitracin ointment     28 g    Apply to the incision twice daily for 7 days to prevent a wound infection    Other specified causes of urethral stricture       MARYURI CONTOUR test strip   Generic drug:  blood glucose monitoring      Dispense test strips covered by the patient insurance. Test 3 times per day.        empagliflozin 10 MG Tabs tablet    JARDIANCE     Take 10 mg by mouth        insulin lispro 100 UNIT/ML injection    HumaLOG PEN     Inject 8 units with breakfast and lunch and 10 units with supper.        LANTUS SC      Inject 70 Units Subcutaneous At Bedtime        lisinopril 10 MG tablet    PRINIVIL/ZESTRIL     Take 10 mg by mouth daily (with dinner)        loratadine 10 MG tablet    CLARITIN     Take 10 mg by mouth        metFORMIN 500 MG 24 hr tablet    GLUCOPHAGE-XR     Take 1,000 mg by mouth daily (with dinner)        MULTI COMPLETE PO           nitroFURantoin (macrocrystal-monohydrate) 100 MG capsule    MACROBID    30 capsule    Take 1 capsule (100 mg) by mouth daily Take while your catheter is in place    Other specified causes of urethral stricture       NovoLOG FLEXPEN 100 UNIT/ML injection   Generic drug:  insulin aspart           oxyCODONE IR 5 MG tablet    ROXICODONE    30 tablet    Take 1-2 tablets (5-10 mg) by mouth every 6 hours as needed for severe pain maximum 6 tablet(s) per day    Other specified causes of urethral stricture       senna-docusate 8.6-50 MG per tablet    SENOKOT-S;PERICOLACE    60 tablet    Take 2 tablets by mouth 2 times daily to prevent constipation with narcotic pain medication. Hold if you have loose stools.    Other specified causes of urethral stricture       simvastatin 20 MG tablet    ZOCOR     Take 20 mg by mouth        tadalafil 10 MG tablet    CIALIS     Take 10 mg by mouth        tolterodine 4 MG 24 hr capsule    DETROL LA    30 capsule    Take 1  capsule (4 mg) by mouth daily as needed    Other specified causes of urethral stricture

## 2018-07-12 ENCOUNTER — COMMUNICATION - HEALTHEAST (OUTPATIENT)
Dept: INTERNAL MEDICINE | Facility: CLINIC | Age: 38
End: 2018-07-12

## 2018-07-12 DIAGNOSIS — B00.9 HSV (HERPES SIMPLEX VIRUS) INFECTION: ICD-10-CM

## 2018-07-16 ENCOUNTER — COMMUNICATION - HEALTHEAST (OUTPATIENT)
Dept: LAB | Facility: CLINIC | Age: 38
End: 2018-07-16

## 2018-07-16 DIAGNOSIS — Z79.4 TYPE 2 DIABETES MELLITUS WITHOUT COMPLICATION, WITH LONG-TERM CURRENT USE OF INSULIN (H): ICD-10-CM

## 2018-07-16 DIAGNOSIS — E11.9 TYPE 2 DIABETES MELLITUS WITHOUT COMPLICATION, WITH LONG-TERM CURRENT USE OF INSULIN (H): ICD-10-CM

## 2018-07-20 ENCOUNTER — AMBULATORY - HEALTHEAST (OUTPATIENT)
Dept: ENDOCRINOLOGY | Facility: CLINIC | Age: 38
End: 2018-07-20

## 2018-07-20 DIAGNOSIS — E11.9 TYPE 2 DIABETES MELLITUS WITHOUT COMPLICATION, WITH LONG-TERM CURRENT USE OF INSULIN (H): ICD-10-CM

## 2018-07-20 DIAGNOSIS — Z79.4 TYPE 2 DIABETES MELLITUS WITHOUT COMPLICATION, WITH LONG-TERM CURRENT USE OF INSULIN (H): ICD-10-CM

## 2018-07-24 ENCOUNTER — COMMUNICATION - HEALTHEAST (OUTPATIENT)
Dept: ENDOCRINOLOGY | Facility: CLINIC | Age: 38
End: 2018-07-24

## 2018-07-25 ENCOUNTER — COMMUNICATION - HEALTHEAST (OUTPATIENT)
Dept: ENDOCRINOLOGY | Facility: CLINIC | Age: 38
End: 2018-07-25

## 2018-07-25 DIAGNOSIS — E11.9 DIABETES MELLITUS TYPE 2, UNCOMPLICATED (H): ICD-10-CM

## 2018-08-03 ENCOUNTER — PRE VISIT (OUTPATIENT)
Dept: UROLOGY | Facility: CLINIC | Age: 38
End: 2018-08-03

## 2018-08-03 NOTE — TELEPHONE ENCOUNTER
Reason for visit: cystoscopy     Relevant information: 3 month post urethroplasty    Records/imaging/labs: all records available    Pt called: generic message left    Rooming: flow/pvr

## 2018-08-06 ENCOUNTER — OFFICE VISIT (OUTPATIENT)
Dept: UROLOGY | Facility: CLINIC | Age: 38
End: 2018-08-06
Payer: COMMERCIAL

## 2018-08-06 ENCOUNTER — TELEPHONE (OUTPATIENT)
Dept: UROLOGY | Facility: CLINIC | Age: 38
End: 2018-08-06

## 2018-08-06 ENCOUNTER — RECORDS - HEALTHEAST (OUTPATIENT)
Dept: ADMINISTRATIVE | Facility: OTHER | Age: 38
End: 2018-08-06

## 2018-08-06 VITALS
BODY MASS INDEX: 38.37 KG/M2 | HEIGHT: 70 IN | DIASTOLIC BLOOD PRESSURE: 84 MMHG | HEART RATE: 68 BPM | WEIGHT: 268 LBS | SYSTOLIC BLOOD PRESSURE: 124 MMHG

## 2018-08-06 DIAGNOSIS — N35.9 URETHRAL STRICTURE, UNSPECIFIED STRICTURE TYPE: Primary | ICD-10-CM

## 2018-08-06 ASSESSMENT — PAIN SCALES - GENERAL
PAINLEVEL: NO PAIN (0)
PAINLEVEL: NO PAIN (0)

## 2018-08-06 NOTE — PROGRESS NOTES
Urethroplasty Follow-up Visit with Surveillance Urethroscopy    PRE-PROCEDURE DIAGNOSIS: History of urethral stricture  POST-PROCEDURE DIAGNOSIS: No evidence of urethral stricture   PROCEDURE: Urethroscopy    HISTORY: Clarence Boyle is a 38 year old man 3 months status-post buccal graft dorsal onlay urethroplasty for urethral stricture.     BMG complaints: No  Positioning complaints: No  Perineal / Genital complaints: complains of vague perineal swelling during sex. Also early loss of erections.  Urinary incontinence: No    Patient does complain of sometimes experiencing difficulty maintaining an erection. He also reports experiencing scrotal swelling during intercourse    Questionnaires reviewed. See flowsheet for details.    REVIEW OF OFFICE STUDIES:  Urinary Flow Rate  Peak Flow: 27.7 mL/s  Average Flow: 14.1 mL/s  Voided (mL): 226 mL   PVR: 82    DESCRIPTION OF PROCEDURE:  After informed consent was obtained, the patient was brought to the procedure room where he was placed in the supine position with all pressure points well padded.  He was prepped and draped in a sterile fashion. A flexible cystoscope was introduced through a well-lubricated urethra.  The anterior urethra up to the point of reconstruction was normal in appearance. At the site of reconstruction there was not evidence of stricture recurrence. The narrowest caliber of the urethra at the reconstruction was estimated to be 22F and this was located in the anastamosis. The flexible cystoscope passed easily. The voluntary sphincter was identified and the scope withdrawn.    ASSESSMENT AND PLAN:  Excellent outcome after urethroplasty. The patient will follow-up in 9 months with with uroflowmetry, post-void residual urine volume measurement, Urethroplasty PROM, VINCENT, MSHQ, and CLSS and post-op questionnaires. Cystoscopy will be needed. If symptoms recur cystoscopy will be done sooner. Reassured him that genital complaints should improve with time.     As the  attending surgeon I, Herb Awad, was present and scrubbed throughout the procedure.

## 2018-08-06 NOTE — LETTER
8/6/2018       RE: Clarence Boyle  [address]  [place] [address]     Dear Colleague,    Thank you for referring your patient, Clarence Boyle, to the [facility] UROLOGY AND [facility] FOR PROSTATE AND UROLOGIC CANCERS at [facility]. Please see a copy of my visit note below.    Urethroplasty Follow-up Visit with Surveillance Urethroscopy    PRE-PROCEDURE DIAGNOSIS: History of urethral stricture  POST-PROCEDURE DIAGNOSIS: No evidence of urethral stricture   PROCEDURE: Urethroscopy    HISTORY: Clarence Boyle is a 38 year old man 3 months status-post buccal graft dorsal onlay urethroplasty for urethral stricture.     BMG complaints: No  Positioning complaints: No  Perineal / Genital complaints: complains of vague perineal swelling during sex. Also early loss of erections.  Urinary incontinence: No    Patient does complain of sometimes experiencing difficulty maintaining an erection. He also reports experiencing scrotal swelling during intercourse    Questionnaires reviewed. See flowsheet for details.    REVIEW OF OFFICE STUDIES:  Urinary Flow Rate  Peak Flow: 27.7 mL/s  Average Flow: 14.1 mL/s  Voided (mL): 226 mL   PVR: 82    DESCRIPTION OF PROCEDURE:  After informed consent was obtained, the patient was brought to the procedure room where he was placed in the supine position with all pressure points well padded.  He was prepped and draped in a sterile fashion. A flexible cystoscope was introduced through a well-lubricated urethra.  The anterior urethra up to the point of reconstruction was normal in appearance. At the site of reconstruction there was not evidence of stricture recurrence. The narrowest caliber of the urethra at the reconstruction was estimated to be 22F and this was located in the anastamosis. The flexible cystoscope passed easily. The voluntary sphincter was identified and the scope withdrawn.    ASSESSMENT AND PLAN:  Excellent outcome after urethroplasty. The patient will  follow-up in 9 months with with uroflowmetry, post-void residual urine volume measurement, Urethroplasty PROM, VINCENT, MSHQ, and CLSS and post-op questionnaires. Cystoscopy will be needed. If symptoms recur cystoscopy will be done sooner. Reassured him that genital complaints should improve with time.     As the attending surgeon I, Herb Awad, was present and scrubbed throughout the procedure.      Again, thank you for allowing me to participate in the care of your patient.      Sincerely,    Herb Awad MD

## 2018-08-06 NOTE — TELEPHONE ENCOUNTER
Health Call Center    Phone Message    May a detailed message be left on voicemail: yes    Reason for Call: Other: Patient called in to confirm a message he got on his voicemail on Friday. Patient states it was garbled and unclear, he thinks he heard arrive with full bladder, but was not sure if he can be eating or if he was supposed to be fasting. Please call him back to confirm appt details/instructions. Thanks!     Action Taken: Message routed to:  [facility] Urology     May 15, 2018      Kory Barthelemy  [address]  [place] [address]        Dear Mr.Barthelemy,    We are writing to inform you of your test results.    Normal fasting blood sugar (glucose)   Normal lipid panel.     Resulted Orders   GLUCOSE   Result Value Ref Range    Glucose 90 70 - 99 mg/dL      Comment:      Fasting specimen   Lipid panel reflex to direct LDL Fasting   Result Value Ref Range    Cholesterol 149 <200 mg/dL    Triglycerides 32 <150 mg/dL      Comment:      Fasting specimen    HDL Cholesterol 65 >39 mg/dL    LDL Cholesterol Calculated 78 <100 mg/dL      Comment:      Desirable:       <100 mg/dl    Non HDL Cholesterol 84 <130 mg/dL       If you have any questions or concerns, please call the clinic at the number listed above.       Sincerely,        Gaby Glynn MD

## 2018-08-06 NOTE — PATIENT INSTRUCTIONS
"Return in May of 2019 for a cystoscopy. Come with a full bladder for a urine flow test.          AFTER YOUR CYSTOSCOPY        You have just completed a cystoscopy, or \"cysto\", which allowed your physician to learn more about your bladder (or to remove a stent placed after surgery). We suggest that you continue to avoid caffeine, fruit juice, and alcohol for the next 24 hours, however, you are encouraged to return to your normal activities.         A few things that are considered normal after your cystoscopy:     * Small amount of bleeding (or spotting) that clears within the next 24 hours     * Slight burning sensation with urination     * Sensation to of needing to avoid more frequently     * The feeling of \"air\" in your urine     * Mild discomfort that is relieved with Tylenol        Please contact our office promptly if you:     * Develop a fever above 101 degrees     * Are unable to urinate     * Develop bright red blood that does not stop     * Severe pain or swelling         Please contact our office with any concerns or questions @[email or web address].  "

## 2018-08-06 NOTE — NURSING NOTE
"Chief Complaint   Patient presents with     Cystoscopy     3 month post urethroplasty       Blood pressure 124/84, pulse 68, height 1.778 m (5' 10\"), weight 121.6 kg (268 lb). Body mass index is 38.45 kg/(m^2).    Patient Active Problem List   Diagnosis     Urethral stricture       Allergies   Allergen Reactions     Liraglutide      Stomach upset, vomiting      Penicillins        Current Outpatient Prescriptions   Medication Sig Dispense Refill     acetaminophen (TYLENOL) 325 MG tablet Take 1-2 tablets (325-650 mg) by mouth every 6 hours as needed for mild pain 45 tablet 0     blood glucose monitoring (MARYURI CONTOUR) test strip Dispense test strips covered by the patient insurance. Test 3 times per day.       empagliflozin (JARDIANCE) 10 MG TABS tablet Take 10 mg by mouth       Insulin Glargine (LANTUS SC) Inject 70 Units Subcutaneous At Bedtime       insulin lispro (HUMALOG PEN) 100 UNIT/ML injection Inject 8 units with breakfast and lunch and 10 units with supper.       insulin pen needle (B-D U/F) 31G X 8 MM FOR ADMINISTERING INSULIN (3 HUMALOG AND 1 LANTUS)       lisinopril (PRINIVIL/ZESTRIL) 10 MG tablet Take 10 mg by mouth daily (with dinner)        loratadine (CLARITIN) 10 MG tablet Take 10 mg by mouth       metFORMIN (GLUCOPHAGE-XR) 500 MG 24 hr tablet Take 1,000 mg by mouth daily (with dinner)        Multiple Vitamins-Minerals (MULTI COMPLETE PO)        simvastatin (ZOCOR) 20 MG tablet Take 20 mg by mouth       tadalafil (CIALIS) 10 MG tablet Take 10 mg by mouth         Social History   Substance Use Topics     Smoking status: Never Smoker     Smokeless tobacco: Never Used     Alcohol use Yes      Comment: 2-3/month       LISA Rivas  2018  3:44 PM         Invasive Procedure Safety Checklist:    Procedure:     Action: Complete sections and checkboxes as appropriate.    Pre-procedure:  1. Patient ID Verified with 2 identifiers (Mayra and  or MRN) : YES    2. Procedure and site verified with " patient/designee (when able) : YES    3. Accurate consent documentation in medical record : YES    4. H&P (or appropriate assessment) documented in medical record : YES  H&P must be up to 30 days prior to procedure an updated within 24 hours of                 Procedure as applicable.     5. Relevant diagnostic and radiology test results appropriately labeled and displayed as applicable : YES    6. Blood products, implants, devices, and/or special equipment available for the procedure as applicable : YES    7. Procedure site(s) marked with provider initials [Exclusions: None] : NO    8. Marking not required. Reason : Yes  Procedure does not require site marking    Time Out:     Time-Out performed immediately prior to starting procedure, including verbal and active participation of all team members addressing: YES    1. Correct patient identity.  2. Confirmed that the correct side and site are marked.  3. An accurate procedure to be done.  4. Agreement on the procedure to be done.  5. Correct patient position.  6. Relevant images and results are properly labeled and appropriately displayed.  7. The need to administer antibiotics or fluids for irrigation purposes during the procedure as applicable.  8. Safety precautions based on patient history or medication use.    During Procedure: Verification of correct person, site, and procedure occurs any time the responsibility for care of the patient is transferred to another member of the care team.    The following medication was given:     MEDICATION:  Lidocaine 2% jelly  ROUTE: topical  SITE: urethra via the meatus  DOSE: 10 mL  LOT #: [id number]  : IMS, Limited  EXPIRATION DATE: 04/20  NDC#: [address]-9   Was there drug waste? No  Multi-dose vial: No    Parias Osman CMA  August 6, 2018

## 2018-08-06 NOTE — MR AVS SNAPSHOT
"              After Visit Summary   8/6/2018    Clarence Boyle    MRN: [id number]           Patient Information     Date Of Birth          1980        Visit Information        Provider Department      8/6/2018 3:30 PM Herb Awad MD [facility] Urology and [facility] for Prostate and Urologic Cancers        Today's Diagnoses     Urethral stricture, unspecified stricture type    -  1      Care Instructions    Return in May of 2019 for a cystoscopy. Come with a full bladder for a urine flow test.          AFTER YOUR CYSTOSCOPY        You have just completed a cystoscopy, or \"cysto\", which allowed your physician to learn more about your bladder (or to remove a stent placed after surgery). We suggest that you continue to avoid caffeine, fruit juice, and alcohol for the next 24 hours, however, you are encouraged to return to your normal activities.         A few things that are considered normal after your cystoscopy:     * Small amount of bleeding (or spotting) that clears within the next 24 hours     * Slight burning sensation with urination     * Sensation to of needing to avoid more frequently     * The feeling of \"air\" in your urine     * Mild discomfort that is relieved with Tylenol        Please contact our office promptly if you:     * Develop a fever above 101 degrees     * Are unable to urinate     * Develop bright red blood that does not stop     * Severe pain or swelling         Please contact our office with any concerns or questions @[email or web address].          Follow-ups after your visit        Follow-up notes from your care team     Return in 12 months (on 8/6/2019).      Your next 10 appointments already scheduled     May 20, 2019  3:30 PM CDT   (Arrive by 3:15 PM)   Cystoscopy with Herb Awad MD   [facility] Urology and [facility] for Prostate and Urologic Cancers ([facility])    [address] [place] [address] [phone]              Who to contact     " "Please call your clinic at [phone] to:    Ask questions about your health    Make or cancel appointments    Discuss your medicines    Learn about your test results    Speak to your doctor            Additional Information About Your Visit        [employer] Information     NICO gives you secure access to your electronic health record. If you see a primary care provider, you can also send messages to your care team and make appointments. If you have questions, please call your primary care clinic.  If you do not have a primary care provider, please call [phone] and they will assist you.      NICO is an electronic gateway that provides easy, online access to your medical records. With NICO, you can request a clinic appointment, read your test results, renew a prescription or communicate with your care team.     To access your existing account, please contact your [facility] or call [phone] for assistance.        Care EveryWhere ID     This is your Care EveryWhere ID. This could be used by other organizations to access your [place] medical records  [id number]        Your Vitals Were     Pulse Height BMI (Body Mass Index)             68 1.778 m (5' 10\") 38.45 kg/m2          Blood Pressure from Last 3 Encounters:   08/06/18 124/84   06/04/18 126/80   05/21/18 117/78    Weight from Last 3 Encounters:   08/06/18 121.6 kg (268 lb)   06/04/18 119.3 kg (263 lb)   05/21/18 119.3 kg (263 lb)              We Performed the Following     CYSTOURETHROSCOPY          Today's Medication Changes          These changes are accurate as of 8/6/18  6:17 PM.  If you have any questions, ask your nurse or doctor.               Stop taking these medicines if you haven't already. Please contact your care team if you have questions.     bacitracin ointment   Stopped by:  Herb Awad MD           nitroFURantoin (macrocrystal-monohydrate) 100 MG capsule   Commonly known as:  MACROBID "   Stopped by:  Herb Awad MD           NovoLOG FLEXPEN 100 UNIT/ML injection   Generic drug:  insulin aspart   Stopped by:  Herb Awad MD           oxyCODONE IR 5 MG tablet   Commonly known as:  ROXICODONE   Stopped by:  Herb Awad MD           senna-docusate 8.6-50 MG per tablet   Commonly known as:  SENOKOT-S;PERICOLACE   Stopped by:  Herb Awad MD           tolterodine 4 MG 24 hr capsule   Commonly known as:  DETROL LA   Stopped by:  Herb Awad MD                    Primary Care Provider Office Phone # Fax #    Marcos LOMELI Do Brar,  [phone]       [place] [address]        Equal Access to Services     [facility]: Hadii aad ku hadasho Soomaali, waaxda luqadaha, qaybta kaalmada adeegyada, waxay idiin hayary ledesma . [facility] [phone].    ATENCIÓN: Si habla español, tiene a he disposición servicios gratuitos de asistencia lingüística. Llame al [phone].    We comply with applicable federal civil rights laws and [place] laws. We do not discriminate on the basis of race, color, national origin, age, disability, sex, sexual orientation, or gender identity.            Thank you!     Thank you for choosing [facility] UROLOGY AND [facility] FOR PROSTATE AND UROLOGIC CANCERS  for your care. Our goal is always to provide you with excellent care. Hearing back from our patients is one way we can continue to improve our services. Please take a few minutes to complete the written survey that you may receive in the mail after your visit with us. Thank you!             Your Updated Medication List - Protect others around you: Learn how to safely use, store and throw away your medicines at www.disposemymeds.org.          This list is accurate as of 8/6/18  6:17 PM.  Always use your most recent med list.                   Brand Name Dispense Instructions for use Diagnosis    acetaminophen 325 MG  tablet    TYLENOL    45 tablet    Take 1-2 tablets (325-650 mg) by mouth every 6 hours as needed for mild pain    Other specified causes of urethral stricture       B-D U/F 31G X 8 MM   Generic drug:  insulin pen needle      FOR ADMINISTERING INSULIN (3 HUMALOG AND 1 LANTUS)        [employer] CONTOUR test strip   Generic drug:  blood glucose monitoring      Dispense test strips covered by the patient insurance. Test 3 times per day.        empagliflozin 10 MG Tabs tablet    JARDIANCE     Take 10 mg by mouth        insulin lispro 100 UNIT/ML injection    HumaLOG PEN     Inject 8 units with breakfast and lunch and 10 units with supper.        LANTUS SC      Inject 70 Units Subcutaneous At Bedtime        lisinopril 10 MG tablet    PRINIVIL/ZESTRIL     Take 10 mg by mouth daily (with dinner)        loratadine 10 MG tablet    CLARITIN     Take 10 mg by mouth        metFORMIN 500 MG 24 hr tablet    GLUCOPHAGE-XR     Take 1,000 mg by mouth daily (with dinner)        MULTI COMPLETE PO           simvastatin 20 MG tablet    ZOCOR     Take 20 mg by mouth        tadalafil 10 MG tablet    CIALIS     Take 10 mg by mouth

## 2018-08-07 ENCOUNTER — AMBULATORY - HEALTHEAST (OUTPATIENT)
Dept: LAB | Facility: CLINIC | Age: 38
End: 2018-08-07

## 2018-08-07 DIAGNOSIS — Z79.4 TYPE 2 DIABETES MELLITUS WITHOUT COMPLICATION, WITH LONG-TERM CURRENT USE OF INSULIN (H): ICD-10-CM

## 2018-08-07 DIAGNOSIS — E11.9 TYPE 2 DIABETES MELLITUS WITHOUT COMPLICATION, WITH LONG-TERM CURRENT USE OF INSULIN (H): ICD-10-CM

## 2018-08-07 LAB
CREAT SERPL-MCNC: 0.81 MG/DL (ref 0.7–1.3)
CREAT UR-MCNC: 72.3 MG/DL
GFR SERPL CREATININE-BSD FRML MDRD: >60 ML/MIN/1.73M2
HBA1C MFR BLD: 7.2 % (ref 3.5–6)
MICROALBUMIN UR-MCNC: <0.5 MG/DL (ref 0–1.99)
MICROALBUMIN/CREAT UR: NORMAL MG/G
POTASSIUM BLD-SCNC: 4.7 MMOL/L (ref 3.5–5)

## 2018-08-09 ENCOUNTER — COMMUNICATION - HEALTHEAST (OUTPATIENT)
Dept: ENDOCRINOLOGY | Facility: CLINIC | Age: 38
End: 2018-08-09

## 2018-08-09 DIAGNOSIS — Z79.4 TYPE 2 DIABETES MELLITUS WITHOUT COMPLICATION, WITH LONG-TERM CURRENT USE OF INSULIN (H): ICD-10-CM

## 2018-08-09 DIAGNOSIS — E11.9 TYPE 2 DIABETES MELLITUS WITHOUT COMPLICATION, WITH LONG-TERM CURRENT USE OF INSULIN (H): ICD-10-CM

## 2018-08-23 ENCOUNTER — COMMUNICATION - HEALTHEAST (OUTPATIENT)
Dept: ENDOCRINOLOGY | Facility: CLINIC | Age: 38
End: 2018-08-23

## 2018-08-23 DIAGNOSIS — E11.9 DIABETES MELLITUS TYPE 2, UNCOMPLICATED (H): ICD-10-CM

## 2018-08-24 ENCOUNTER — HOSPITAL ENCOUNTER (EMERGENCY)
Facility: CLINIC | Age: 38
Discharge: HOME | End: 2018-08-24
Attending: EMERGENCY MEDICINE | Admitting: EMERGENCY MEDICINE
Payer: COMMERCIAL

## 2018-08-24 ENCOUNTER — APPOINTMENT (OUTPATIENT)
Dept: MRI IMAGING | Facility: CLINIC | Age: 38
End: 2018-08-24
Attending: EMERGENCY MEDICINE
Payer: COMMERCIAL

## 2018-08-24 ENCOUNTER — RECORDS - HEALTHEAST (OUTPATIENT)
Dept: ADMINISTRATIVE | Facility: OTHER | Age: 38
End: 2018-08-24

## 2018-08-24 ENCOUNTER — NURSE TRIAGE (OUTPATIENT)
Dept: NURSING | Facility: CLINIC | Age: 38
End: 2018-08-24

## 2018-08-24 VITALS
SYSTOLIC BLOOD PRESSURE: 121 MMHG | BODY MASS INDEX: 37.22 KG/M2 | HEIGHT: 70 IN | RESPIRATION RATE: 16 BRPM | TEMPERATURE: 98.4 F | WEIGHT: 260 LBS | OXYGEN SATURATION: 98 % | DIASTOLIC BLOOD PRESSURE: 75 MMHG

## 2018-08-24 DIAGNOSIS — R20.2 PARESTHESIA: ICD-10-CM

## 2018-08-24 DIAGNOSIS — R51.9 ACUTE NONINTRACTABLE HEADACHE, UNSPECIFIED HEADACHE TYPE: ICD-10-CM

## 2018-08-24 LAB
ANION GAP SERPL CALCULATED.3IONS-SCNC: 8 MMOL/L (ref 3–14)
BASOPHILS # BLD AUTO: 0 10E9/L (ref 0–0.2)
BASOPHILS NFR BLD AUTO: 0.5 %
BUN SERPL-MCNC: 17 MG/DL (ref 7–30)
CALCIUM SERPL-MCNC: 8.6 MG/DL (ref 8.5–10.1)
CHLORIDE SERPL-SCNC: 108 MMOL/L (ref 94–109)
CO2 SERPL-SCNC: 23 MMOL/L (ref 20–32)
CREAT SERPL-MCNC: 0.68 MG/DL (ref 0.66–1.25)
DIFFERENTIAL METHOD BLD: NORMAL
EOSINOPHIL # BLD AUTO: 0.2 10E9/L (ref 0–0.7)
EOSINOPHIL NFR BLD AUTO: 2.7 %
ERYTHROCYTE [DISTWIDTH] IN BLOOD BY AUTOMATED COUNT: 14.6 % (ref 10–15)
GFR SERPL CREATININE-BSD FRML MDRD: >90 ML/MIN/1.7M2
GLUCOSE SERPL-MCNC: 144 MG/DL (ref 70–99)
HCT VFR BLD AUTO: 47.3 % (ref 40–53)
HGB BLD-MCNC: 15.6 G/DL (ref 13.3–17.7)
IMM GRANULOCYTES # BLD: 0 10E9/L (ref 0–0.4)
IMM GRANULOCYTES NFR BLD: 0.1 %
LYMPHOCYTES # BLD AUTO: 2.4 10E9/L (ref 0.8–5.3)
LYMPHOCYTES NFR BLD AUTO: 31.1 %
MCH RBC QN AUTO: 27.9 PG (ref 26.5–33)
MCHC RBC AUTO-ENTMCNC: 33 G/DL (ref 31.5–36.5)
MCV RBC AUTO: 85 FL (ref 78–100)
MONOCYTES # BLD AUTO: 0.7 10E9/L (ref 0–1.3)
MONOCYTES NFR BLD AUTO: 9.6 %
NEUTROPHILS # BLD AUTO: 4.3 10E9/L (ref 1.6–8.3)
NEUTROPHILS NFR BLD AUTO: 56 %
NRBC # BLD AUTO: 0 10*3/UL
NRBC BLD AUTO-RTO: 0 /100
PLATELET # BLD AUTO: 227 10E9/L (ref 150–450)
POTASSIUM SERPL-SCNC: 4.1 MMOL/L (ref 3.4–5.3)
RBC # BLD AUTO: 5.59 10E12/L (ref 4.4–5.9)
SODIUM SERPL-SCNC: 139 MMOL/L (ref 133–144)
WBC # BLD AUTO: 7.7 10E9/L (ref 4–11)

## 2018-08-24 PROCEDURE — 70549 MR ANGIOGRAPH NECK W/O&W/DYE: CPT

## 2018-08-24 PROCEDURE — 25000128 H RX IP 250 OP 636: Performed by: EMERGENCY MEDICINE

## 2018-08-24 PROCEDURE — 70544 MR ANGIOGRAPHY HEAD W/O DYE: CPT | Mod: XS

## 2018-08-24 PROCEDURE — 80048 BASIC METABOLIC PNL TOTAL CA: CPT | Performed by: EMERGENCY MEDICINE

## 2018-08-24 PROCEDURE — 85025 COMPLETE CBC W/AUTO DIFF WBC: CPT | Performed by: EMERGENCY MEDICINE

## 2018-08-24 PROCEDURE — A9585 GADOBUTROL INJECTION: HCPCS | Performed by: EMERGENCY MEDICINE

## 2018-08-24 PROCEDURE — 70553 MRI BRAIN STEM W/O & W/DYE: CPT

## 2018-08-24 PROCEDURE — 96360 HYDRATION IV INFUSION INIT: CPT | Mod: 59

## 2018-08-24 PROCEDURE — 96361 HYDRATE IV INFUSION ADD-ON: CPT

## 2018-08-24 PROCEDURE — 99285 EMERGENCY DEPT VISIT HI MDM: CPT | Mod: 25

## 2018-08-24 RX ORDER — GADOBUTROL 604.72 MG/ML
10 INJECTION INTRAVENOUS ONCE
Status: COMPLETED | OUTPATIENT
Start: 2018-08-24 | End: 2018-08-24

## 2018-08-24 RX ADMIN — SODIUM CHLORIDE 1000 ML: 9 INJECTION, SOLUTION INTRAVENOUS at 09:32

## 2018-08-24 RX ADMIN — GADOBUTROL 10 ML: 604.72 INJECTION INTRAVENOUS at 13:07

## 2018-08-24 ASSESSMENT — ENCOUNTER SYMPTOMS
WEAKNESS: 1
FACIAL ASYMMETRY: 0
SPEECH DIFFICULTY: 0
HEADACHES: 0
NUMBNESS: 1
LIGHT-HEADEDNESS: 1
DIARRHEA: 0
CONSTIPATION: 0

## 2018-08-24 NOTE — ED AVS SNAPSHOT
Emergency Department    [address] [place] [address]    Phone:  [phone]    Fax:  [phone]                                       Clarence Boyle   MRN: [id number]    Department:   Emergency Department   Date of Visit:  8/24/2018           After Visit Summary Signature Page     I have received my discharge instructions, and my questions have been answered. I have discussed any challenges I see with this plan with the nurse or doctor.    ..........................................................................................................................................  Patient/Patient Representative Signature      ..........................................................................................................................................  Patient Representative Print Name and Relationship to Patient    ..................................................               ................................................  Date                                            Time    ..........................................................................................................................................  Reviewed by Signature/Title    ...................................................              ..............................................  Date                                                            Time          22EPIC Rev 08/18

## 2018-08-24 NOTE — DISCHARGE INSTRUCTIONS
Home, if you get these symptoms again in the same arm, try Excedrin Migraine.  Keep a journal of your symptoms and set an appointment up with your doctor next week for a recheck.  If the symptoms persist greater than 30-60 minutes, return to the emergency room.

## 2018-08-24 NOTE — ED AVS SNAPSHOT
Emergency Department    [address] [facility] [address]    Phone:  [phone]    Fax:  [phone]                                       Clarence Boyle   MRN: [id number]    Department:   Emergency Department   Date of Visit:  8/24/2018           Patient Information     Date Of Birth          1980        Your diagnoses for this visit were:     Paresthesia transient right arm    Acute nonintractable headache, unspecified headache type Suspect migraine, resolved       You were seen by Janay Bazan MD.      Follow-up Information     Schedule an appointment as soon as possible for a visit with Marcos Brar Do, DO.    Contact information:    [facility]  [address] [facility] [id number] [phone]          Discharge Instructions       Home, if you get these symptoms again in the same arm, try Excedrin Migraine.  Keep a journal of your symptoms and set an appointment up with your doctor next week for a recheck.  If the symptoms persist greater than 30-60 minutes, return to the emergency room.              Discharge References/Attachments     PARAESTHESIAS (ENGLISH)    HEADACHES, SELF-CARE FOR (ENGLISH)      Your next 10 appointments already scheduled     May 20, 2019  3:30 PM CDT   (Arrive by 3:15 PM)   Cystoscopy with Herb Awad MD   [facility] ([facility])    [address] [place] [id number] [phone]              24 Hour Appointment Hotline       To make an appointment at any [facility], call [phone] ([phone]). If you don't have a family doctor or clinic, we will help you find one. [facility] are conveniently located to serve the needs of you and your family.             Review of your medicines      Our records show that you are taking the medicines listed below. If these are incorrect, please call your family doctor or clinic.         Dose / Directions Last dose taken    acetaminophen 325 MG tablet   Commonly known as:  TYLENOL   Dose:  325-650 mg   Quantity:  45 tablet        Take 1-2 tablets (325-650 mg) by mouth every 6 hours as needed for mild pain   Refills:  0        B-D U/F 31G X 8 MM   Generic drug:  insulin pen needle        FOR ADMINISTERING INSULIN (3 HUMALOG AND 1 LANTUS)   Refills:  0        MARYURI CONTOUR test strip   Generic drug:  blood glucose monitoring        Dispense test strips covered by the patient insurance. Test 3 times per day.   Refills:  0        empagliflozin 10 MG Tabs tablet   Commonly known as:  JARDIANCE   Dose:  10 mg        Take 10 mg by mouth   Refills:  0        insulin lispro 100 UNIT/ML injection   Commonly known as:  HumaLOG PEN        Inject 8 units with breakfast and lunch and 10 units with supper.   Refills:  0        LANTUS SC   Dose:  70 Units        Inject 70 Units Subcutaneous At Bedtime   Refills:  0        lisinopril 10 MG tablet   Commonly known as:  PRINIVIL/ZESTRIL   Dose:  10 mg        Take 10 mg by mouth daily (with dinner)   Refills:  0        loratadine 10 MG tablet   Commonly known as:  CLARITIN   Dose:  10 mg        Take 10 mg by mouth   Refills:  0        metFORMIN 500 MG 24 hr tablet   Commonly known as:  GLUCOPHAGE-XR   Dose:  1000 mg        Take 1,000 mg by mouth daily (with dinner)   Refills:  0        MULTI COMPLETE PO        Refills:  0        simvastatin 20 MG tablet   Commonly known as:  ZOCOR   Dose:  20 mg        Take 20 mg by mouth   Refills:  0        tadalafil 10 MG tablet   Commonly known as:  CIALIS   Dose:  10 mg        Take 10 mg by mouth   Refills:  0                Procedures and tests performed during your visit     Basic metabolic panel    CBC with platelets differential    MR Brain w/o & w Contrast    MR Head w/o Contrast Angiogram    MR Neck w/o & w Contrast Angiogram      Orders Needing Specimen Collection     None      Pending Results     Date and Time Order  Name Status Description    8/24/2018 0919 MR Neck w/o & w Contrast Angiogram Preliminary     8/24/2018 0919 MR Head w/o Contrast Angiogram Preliminary     8/24/2018 0919 MR Brain w/o & w Contrast Preliminary             Pending Culture Results     No orders found from 8/22/2018 to 8/25/2018.            Pending Results Instructions     If you had any lab results that were not finalized at the time of your Discharge, you can call the ED Lab Result RN at [phone]. You will be contacted by this team for any positive Lab results or changes in treatment. The nurses are available 7 days a week from 10A to 6:30P.  You can leave a message 24 hours per day and they will return your call.        Test Results From Your Hospital Stay        8/24/2018 10:01 AM      Component Results     Component Value Ref Range & Units Status    WBC 7.7 4.0 - 11.0 10e9/L Final    RBC Count 5.59 4.4 - 5.9 10e12/L Final    Hemoglobin 15.6 13.3 - 17.7 g/dL Final    Hematocrit 47.3 40.0 - 53.0 % Final    MCV 85 78 - 100 fl Final    MCH 27.9 26.5 - 33.0 pg Final    MCHC 33.0 31.5 - 36.5 g/dL Final    RDW 14.6 10.0 - 15.0 % Final    Platelet Count 227 150 - 450 10e9/L Final    Diff Method Automated Method  Final    % Neutrophils 56.0 % Final    % Lymphocytes 31.1 % Final    % Monocytes 9.6 % Final    % Eosinophils 2.7 % Final    % Basophils 0.5 % Final    % Immature Granulocytes 0.1 % Final    Nucleated RBCs 0 0 /100 Final    Absolute Neutrophil 4.3 1.6 - 8.3 10e9/L Final    Absolute Lymphocytes 2.4 0.8 - 5.3 10e9/L Final    Absolute Monocytes 0.7 0.0 - 1.3 10e9/L Final    Absolute Eosinophils 0.2 0.0 - 0.7 10e9/L Final    Absolute Basophils 0.0 0.0 - 0.2 10e9/L Final    Abs Immature Granulocytes 0.0 0 - 0.4 10e9/L Final    Absolute Nucleated RBC 0.0  Final         8/24/2018  9:58 AM      Component Results     Component Value Ref Range & Units Status    Sodium 139 133 - 144 mmol/L Final    Potassium 4.1 3.4 - 5.3 mmol/L Final    Chloride 108 94 -  109 mmol/L Final    Carbon Dioxide 23 20 - 32 mmol/L Final    Anion Gap 8 3 - 14 mmol/L Final    Glucose 144 (H) 70 - 99 mg/dL Final    Urea Nitrogen 17 7 - 30 mg/dL Final    Creatinine 0.68 0.66 - 1.25 mg/dL Final    GFR Estimate >90 >60 mL/min/1.7m2 Final    Non  GFR Calc    GFR Estimate If Black >90 >60 mL/min/1.7m2 Final    African American GFR Calc    Calcium 8.6 8.5 - 10.1 mg/dL Final         8/24/2018  1:31 PM      Narrative     MRI OF THE BRAIN WITHOUT AND WITH CONTRAST 8/24/2018 1:14 PM     COMPARISON: None.    HISTORY:  10-15 minute episode of right arm weakness and numbness this  morning at 7:20.  Other than some mild numbness, symptoms have  resolved.     TECHNIQUE: Multi-sequence, multi-planar MRI images of the brain were  acquired before and after the administration of IV gadolinium (10 mL  Gadavist).    FINDINGS: There are a few tiny scattered focal areas of abnormal T2  signal hyperintensity in the deep and subcortical white matter of the  cerebral hemispheres bilaterally that are nonspecific with an  extensive differential including: Sequela of migraine headaches,  sequela of previous trauma, demyelinating disease (ADEM, MS),  infection (Lyme disease), and chronic small vessel ischemic disease  (found in patients with long-standing hypertension and/or diabetes).  Gray-white differentiation of the brain is otherwise within normal  limits.    The ventricles and basal cisterns are normal in configuration. There  is no midline shift. There are no extra-axial fluid collections. There  is no evidence for stroke or acute intracranial hemorrhage. There is  no abnormal contrast enhancement in the brain or its coverings.    There is polypoid mucosal thickening in the right maxillary sinus but  no evidence for acute sinusitis. The remaining paranasal sinuses are  well aerated. There is no mastoiditis.        Impression     IMPRESSION: Nonspecific cerebral white matter changes with  differential  as above. Otherwise, normal brain MRI. No evidence for  acute intracranial pathology.         8/24/2018  1:32 PM      Narrative     MR ANGIOGRAM OF THE HEAD WITHOUT CONTRAST   8/24/2018 1:13 PM     COMPARISON: None.    HISTORY: 10-15 minute episode of right arm weakness and numbness this  morning at 7:20.  Other than some mild numbness, symptoms have  resolved.     TECHNIQUE:  3D time-of-flight MR angiogram of the head without  contrast. MIP reconstruction of all MR angiographic data was  performed.    FINDINGS:  The bilateral distal internal carotid, basilar, bilateral  anterior cerebral, bilateral middle cerebral and bilateral posterior  cerebral arteries are patent and unremarkable with no evidence for  cerebral artery stenosis or aneurysm. The anterior communicating  artery is patent and unremarkable. The posterior communicating artery  on the left is patent and unremarkable.         Impression     IMPRESSION:  Normal MR angiogram of the head.           8/24/2018  1:33 PM      Narrative     MRA NECK WITHOUT AND WITH CONTRAST  8/24/2018 1:14 PM     COMPARISON: None.    HISTORY: Evaluate for dissection, vertebrobasilar flow, carotid  stenosis.     TECHNIQUE: 2D time-of-flight MR angiogram of the neck without contrast  and 3D MR angiogram of the neck with 10 mL Gadavist gadolinium IV  contrast.  MIP reconstruction of all MR angiographic data was  performed. Estimates of carotid stenoses are made relative to the  distal internal carotid artery diameters except as noted.    FINDINGS:    Carotids: The common carotid arteries bilaterally are widely patent.  The cervical internal carotid arteries bilaterally are patent without  stenosis.    Vertebrals: The vertebral arteries bilaterally are patent without  stenosis and demonstrate antegrade flow.    Aortic arch: The arteries as they arise from the aortic arch are  normally arranged with no evidence for stenosis.        Impression     IMPRESSION:    Normal MR angiogram  of the neck.                 Clinical Quality Measure: Blood Pressure Screening     Your blood pressure was checked while you were in the emergency department today. The last reading we obtained was  BP: 126/90 . Please read the guidelines below about what these numbers mean and what you should do about them.  If your systolic blood pressure (the top number) is less than 120 and your diastolic blood pressure (the bottom number) is less than 80, then your blood pressure is normal. There is nothing more that you need to do about it.  If your systolic blood pressure (the top number) is 120-139 or your diastolic blood pressure (the bottom number) is 80-89, your blood pressure may be higher than it should be. You should have your blood pressure rechecked within a year by a primary care provider.  If your systolic blood pressure (the top number) is 140 or greater or your diastolic blood pressure (the bottom number) is 90 or greater, you may have high blood pressure. High blood pressure is treatable, but if left untreated over time it can put you at risk for heart attack, stroke, or kidney failure. You should have your blood pressure rechecked by a primary care provider within the next 4 weeks.  If your provider in the emergency department today gave you specific instructions to follow-up with your doctor or provider even sooner than that, you should follow that instruction and not wait for up to 4 weeks for your follow-up visit.        Thank you for choosing [facility]       Thank you for choosing [facility] for your care. Our goal is always to provide you with excellent care. Hearing back from our patients is one way we can continue to improve our services. Please take a few minutes to complete the written survey that you may receive in the mail after you visit with us. Thank you!        [employer] Information     [employer] gives you secure access to your electronic health record. If you see a primary care provider, you can also  send messages to your care team and make appointments. If you have questions, please call your primary care clinic.  If you do not have a primary care provider, please call [phone] and they will assist you.        Care EveryWhere ID     This is your Care EveryWhere ID. This could be used by other organizations to access your [facility] medical records  [id number]        Equal Access to Services     ABNER TERRY : Hadii dominik Lazaro, waaxda lumaria l, qaybta kaalmada linnea, abbe ledesma . So [facility] [phone].    ATENCIÓN: Si habla español, tiene a he disposición servicios gratuitos de asistencia lingüística. Leobardo al [phone].    We comply with applicable federal civil rights laws and [place] laws. We do not discriminate on the basis of race, color, national origin, age, disability, sex, sexual orientation, or gender identity.            After Visit Summary       This is your record. Keep this with you and show to your community pharmacist(s) and doctor(s) at your next visit.

## 2018-08-24 NOTE — TELEPHONE ENCOUNTER
"Patient states that an hour ago he was sitting in a recliner holding his children (2 and 4 years of age), went to stand up and his right arm was completely numb.  \"It wasn't like it was asleep, it wasn't tingly. It was useless.\"   States that for 5 minutes he could not use the arm at all.   Currently is able to use the arm, \"but it feels off.\"   States the arm feels \"fatigued\".   Did not have any other symptoms during that time, no confusion, no numbness or weakness noted in any other part of the body.   15 minutes ago had 2 episodes of dizziness. \"Like when you get spots before your eyes.\"   First episode occurred while walking to the bedroom, second time occurred when reaching down to tie shoes.   Not currently dizzy, has just dropped his kids off at  and is calling from his car.     Protocol and care advice reviewed.   Caller states understanding of the recommended disposition. Advised to call 911 now. Caller states understanding of this but does not want to do this. States he will probably drive to [facility] ER as he is closet to that, but is going to call and speak with his wife first.   Advised to call back if further questions or concerns.         Reason for Disposition    [1] Weakness (i.e., paralysis, loss of muscle strength) of the face, arm / hand, or leg / foot on one side of the body AND [2] sudden onset AND [3] present now    Additional Information    Negative: [1] SEVERE weakness (i.e., unable to walk or barely able to walk, requires support) AND [2] new onset or worsening    Protocols used: NEUROLOGIC DEFICIT-ADULT-AH      "

## 2018-08-24 NOTE — ED PROVIDER NOTES
"  History     Chief Complaint:  Numbness     HPI   Clarence Boyle is a left-hand dominant 38 year old male with a medical history of hypertension and diabetes who presents with numbness. The patient reports he was sitting on a recliner and watching TV this morning with his children and had his arms resting on the chair while his 4-year-old son was leaning onto his right arm and his 2-year-old child on his left arm. He was also moving his arms while holding both of his children. Patient went to go to the bedroom to grab something until he suddenly felt like he couldn't move his right arm and was unable to lift his right arm. He also felt numbness when he tried to move his fingers. He states it felt like his arm was \"asleep.\" This occurred approximately 2 hours prior to arrival. When patient got dressed 5 minutes later, he started feeling lightheaded and seeing \"spot\" and sat down for a few minutes. He then drove his children to  and called the nurse line who advised the patient to come to the emergency department for further evaluation and treatment. Upon evaluation, he states the episode lasted about a total of 10 minutes. He states his arm currently feels \"tired\" primarily in the upper arm and that it feels like he was lifting heavy weights at the gym. Denies diarrhea, constipation, abdominal pain, recent illnesses, headache, difficulty with speech, facial numbness, facial asymmetry, numbness or weakness in the lower extremities.     CARDIAC RISK FACTORS:  Sex:    Male  Tobacco:   No  Hypertension:   Yes  Hyperlipidemia:  No  Diabetes:   Yes  Family History:  No    PE/DVT RISK FACTORS:  Sex:    Male  Hormones:   No  Tobacco:   No  Cancer:   No  Travel:   No  Surgery:   No  Other immobilization: No  Personal history:  No  Family history:  No   Allergies:  Liraglutide  Penicillins      Medications:    Jardiance  Lantus  Humalog  Prinivil/Zestril  Glucophage  Multi complete  Zocor  Abhi     Past Medical History:  " "  Diabetes  Hypertension  Obese    Past Surgical History:    Urethroplasty stage one with buccal graft    Family History:    History reviewed. No pertinent family history.      Social History:  Smoking status: Never smoker  Alcohol use: Yes   Marital Status:   [2]  PCP: Marcos Brar Do      Review of Systems   Eyes: Positive for visual disturbance (\"spots\").   Gastrointestinal: Negative for constipation and diarrhea.   Neurological: Positive for weakness, light-headedness and numbness. Negative for facial asymmetry, speech difficulty and headaches.   All other systems reviewed and are negative.    Physical Exam   Patient Vitals for the past 24 hrs:   BP Temp Temp src Heart Rate Resp SpO2 Height Weight   08/24/18 0930 126/90 - - 92 - - - -   08/24/18 0928 118/76 - - 73 - - - -   08/24/18 0903 (!) 136/100 - - 81 20 98 % - -   08/24/18 0902 (!) 136/100 98.4  F (36.9  C) Oral 79 16 98 % 1.778 m (5' 10\") 117.9 kg (260 lb)      Physical Exam  Nursing note and vitals reviewed.    Constitutional:  Appears well-developed and well-nourished, comfortable.    HENT:    Nose normal.  No discharge.      Oropharynx is clear and moist.  Eyes:    Conjunctivae are normal without injection. No lid droop.     Pupils are equal, round, and reactive to light.      Right eye exhibits no discharge. Left eye exhibits no discharge.      No scleral icterus.  Lymph:  No enlarged or tender cervical or submandibular lymph nodes.   Cardiovascular:  Normal rate, regular rhythm with normal S1 and S2.      Normal heart sounds and peripheral pulses 2+ and equal.       No murmur or epi.  Pulmonary:  Effort normal and breath sounds clear to auscultation bilaterally   No respiratory distress.  No stridor.     No wheezes. No rales.     GI:    Soft. No distension and no mass. No tenderness.      No rebound and no guarding. No flank pain.  No HSM.  Musculoskeletal:  Normal range of motion. No extremity deformity.     No edema and no tenderness.  " No cyanosis.                                      Neck supple, no midline spinal tenderness.   Neurological:   GCS 15. NIH stroke scale score 1 because of the decreased subjective sensation decrease in the right arm. O X 3.  No sensory deficit. Normal strength.   Noticed subjective decreased sensation to touch to his right forearm and hand. Normal coordination. Normal finger to nose. Normal heel-knee-shin. No hand drift. No leg drift. Visual fields full. EOMs intact. No diplopia. No facial droop. No slurred speech. Mental status and memory normal. Comprehension and expression of speech is normal.   Skin:    Skin is warm and dry. No rash noted. No diaphoresis.      No erythema. No pallor.  No lesions.  Psychiatric:   Behavior is normal. Appropriate mood and affect.     Judgment and thought content normal.     Emergency Department Course   Imaging:  Radiographic findings were communicated with the patient who voiced understanding of the findings.  MR Head w/o Contrast Angiogram  Normal MR angiogram of the head.    As read by Radiology.    MR Neck w/o & w Contrast Angiogram  Normal MR angiogram of the neck.   As read by Radiology.     MR Brain w/o & w Contrast  Nonspecific cerebral white matter changes with  differential as above. Otherwise, normal brain MRI. No evidence for  acute intracranial pathology.  As read by Radiology.     Laboratory:  CBC: WNL (WBC 7.7, HGB 15.6, )  BMP: Glucose 144 (H) WNL (Creatinine 0.68)     Interventions:  0932: NS 1L IV Bolus   1307: Gadavist 10 mLs IV     Emergency Department Course:  Past medical records, nursing notes, and vitals reviewed.  0909: I performed an exam of the patient and obtained history, as documented above.   0915: I discussed the case with Dr. Hudson, stroke neurology.  0919: I rechecked the patient, explained the findings, and discussed the plan with the patient. Patient states he is starting to develop a headache now.  IV inserted and blood drawn for basic  laboratory. Results as noted above.    Patient was given the above interventions while here in the emergency department.   The patient was sent for MR imaging while in the emergency department, findings above.   1341: I spoke with Dr. Hudson.   1347: I rechecked the patient, explained the findings, and discussed the plan with the patient.   Patient discharged home with instructions regarding supportive care, medications, and reasons to return. The importance of close follow-up was reviewed.      Impression & Plan    Medical Decision Making:  Patient presents with acute onset of about 10 minutes of right arm numbness and weakness.  The weakness has resolved, still has a little bit of slight decrease in light touch sensation but now an onset of a mild headache.  His neurologic exam is otherwise completely normal.  I did not call a code stroke.  I talked with Dr. Hudson shortly after the patient arrived, and he recommended going to an MRI/MRA which was obtained.  Laboratory work came back normal with a CBC and basic panel including a normal glucose of 144.  He was given a liter of saline.  The MRA of the head and neck was normal.  MRI of the brain showed a couple of signal changes that can be consistent with migraine.  No evidence of stroke or bleed.  I talked with Dr. Hudson again afterward, and he agreed that with his symptoms and the following headache that this probably is a complex migraine.  I did recheck the patient after he got back from MRI and his headache and the arm symptoms have completely resolved.  I will have him follow-up in the clinic and keep a headache and symptom journal.      Diagnosis:    ICD-10-CM   1. Paresthesia R20.2   Transient right arm   2. Acute nonintractable headache, unspecified headache type R51   Suspect migraine, resolved     Disposition:  Discharged home, if you get these symptoms again in the same arm, try Excedrin Migraine.  Keep a journal of your symptoms and set an appointment  up with your doctor next week for a recheck.  If the symptoms persist greater than 30-60 minutes, return to the emergency room.       Latoya Caceres  8/24/2018    EMERGENCY DEPARTMENT  I, Latoya Caceres, am serving as a scribe at 9:09 AM on 8/24/2018 to document services personally performed by Janay Bazan MD based on my observations and the provider's statements to me.       Janay Bazan MD  08/24/18 8232       Janay Bazan MD  08/24/18 6432

## 2018-08-27 ENCOUNTER — COMMUNICATION - HEALTHEAST (OUTPATIENT)
Dept: ENDOCRINOLOGY | Facility: CLINIC | Age: 38
End: 2018-08-27

## 2018-08-27 DIAGNOSIS — E11.9 DIABETES (H): ICD-10-CM

## 2018-08-27 DIAGNOSIS — E11.9 DIABETES MELLITUS TYPE 2, UNCOMPLICATED (H): ICD-10-CM

## 2018-09-05 ENCOUNTER — COMMUNICATION - HEALTHEAST (OUTPATIENT)
Dept: ENDOCRINOLOGY | Facility: CLINIC | Age: 38
End: 2018-09-05

## 2018-09-05 DIAGNOSIS — E11.9 TYPE 2 DIABETES MELLITUS WITHOUT COMPLICATION, WITH LONG-TERM CURRENT USE OF INSULIN (H): ICD-10-CM

## 2018-09-05 DIAGNOSIS — E11.9 DIABETES MELLITUS TYPE 2, UNCOMPLICATED (H): ICD-10-CM

## 2018-09-05 DIAGNOSIS — E11.9 DIABETES (H): ICD-10-CM

## 2018-09-05 DIAGNOSIS — Z79.4 TYPE 2 DIABETES MELLITUS WITHOUT COMPLICATION, WITH LONG-TERM CURRENT USE OF INSULIN (H): ICD-10-CM

## 2018-10-04 ENCOUNTER — COMMUNICATION - HEALTHEAST (OUTPATIENT)
Dept: ENDOCRINOLOGY | Facility: CLINIC | Age: 38
End: 2018-10-04

## 2018-10-04 DIAGNOSIS — E11.9 DIABETES MELLITUS TYPE 2, UNCOMPLICATED (H): ICD-10-CM

## 2018-10-23 ENCOUNTER — COMMUNICATION - HEALTHEAST (OUTPATIENT)
Dept: INTERNAL MEDICINE | Facility: CLINIC | Age: 38
End: 2018-10-23

## 2018-11-09 ENCOUNTER — COMMUNICATION - HEALTHEAST (OUTPATIENT)
Dept: ENDOCRINOLOGY | Facility: CLINIC | Age: 38
End: 2018-11-09

## 2018-11-09 DIAGNOSIS — E11.9 DIABETES MELLITUS TYPE 2, UNCOMPLICATED (H): ICD-10-CM

## 2018-12-03 ENCOUNTER — COMMUNICATION - HEALTHEAST (OUTPATIENT)
Dept: ENDOCRINOLOGY | Facility: CLINIC | Age: 38
End: 2018-12-03

## 2018-12-03 DIAGNOSIS — Z79.4 TYPE 2 DIABETES MELLITUS WITHOUT COMPLICATION, WITH LONG-TERM CURRENT USE OF INSULIN (H): ICD-10-CM

## 2018-12-03 DIAGNOSIS — E11.9 TYPE 2 DIABETES MELLITUS WITHOUT COMPLICATION, WITH LONG-TERM CURRENT USE OF INSULIN (H): ICD-10-CM

## 2018-12-03 DIAGNOSIS — E11.9 DIABETES (H): ICD-10-CM

## 2018-12-03 DIAGNOSIS — E11.9 DIABETES MELLITUS TYPE 2, UNCOMPLICATED (H): ICD-10-CM

## 2018-12-19 ENCOUNTER — COMMUNICATION - HEALTHEAST (OUTPATIENT)
Dept: ENDOCRINOLOGY | Facility: CLINIC | Age: 38
End: 2018-12-19

## 2018-12-19 DIAGNOSIS — E11.9 DIABETES MELLITUS TYPE 2, UNCOMPLICATED (H): ICD-10-CM

## 2018-12-31 ENCOUNTER — COMMUNICATION - HEALTHEAST (OUTPATIENT)
Dept: ENDOCRINOLOGY | Facility: CLINIC | Age: 38
End: 2018-12-31

## 2018-12-31 DIAGNOSIS — E11.9 DIABETES MELLITUS TYPE 2, UNCOMPLICATED (H): ICD-10-CM

## 2019-01-10 ENCOUNTER — AMBULATORY - HEALTHEAST (OUTPATIENT)
Dept: LAB | Facility: CLINIC | Age: 39
End: 2019-01-10

## 2019-01-10 DIAGNOSIS — E11.9 DIABETES MELLITUS TYPE 2, UNCOMPLICATED (H): ICD-10-CM

## 2019-01-10 LAB
CREAT SERPL-MCNC: 0.85 MG/DL (ref 0.7–1.3)
CREAT UR-MCNC: 79.3 MG/DL
GFR SERPL CREATININE-BSD FRML MDRD: >60 ML/MIN/1.73M2
HBA1C MFR BLD: 7.7 % (ref 3.5–6)
MICROALBUMIN UR-MCNC: <0.5 MG/DL (ref 0–1.99)
MICROALBUMIN/CREAT UR: NORMAL MG/G
POTASSIUM BLD-SCNC: 4.6 MMOL/L (ref 3.5–5)

## 2019-01-24 ENCOUNTER — OFFICE VISIT - HEALTHEAST (OUTPATIENT)
Dept: INTERNAL MEDICINE | Facility: CLINIC | Age: 39
End: 2019-01-24

## 2019-01-24 DIAGNOSIS — N39.43 DRIBBLING URINE: ICD-10-CM

## 2019-01-24 DIAGNOSIS — I10 ESSENTIAL HYPERTENSION: ICD-10-CM

## 2019-01-24 DIAGNOSIS — E66.01 MORBID OBESITY (H): ICD-10-CM

## 2019-01-24 DIAGNOSIS — Z87.448 H/O URETHRAL STRICTURE: ICD-10-CM

## 2019-01-24 DIAGNOSIS — Z79.4 TYPE 2 DIABETES MELLITUS WITHOUT COMPLICATION, WITH LONG-TERM CURRENT USE OF INSULIN (H): ICD-10-CM

## 2019-01-24 DIAGNOSIS — Z11.3 SCREEN FOR STD (SEXUALLY TRANSMITTED DISEASE): ICD-10-CM

## 2019-01-24 DIAGNOSIS — E11.9 TYPE 2 DIABETES MELLITUS WITHOUT COMPLICATION, WITH LONG-TERM CURRENT USE OF INSULIN (H): ICD-10-CM

## 2019-01-24 DIAGNOSIS — Z00.00 HEALTH MAINTENANCE EXAMINATION: ICD-10-CM

## 2019-01-24 DIAGNOSIS — E78.5 HYPERLIPIDEMIA, UNSPECIFIED HYPERLIPIDEMIA TYPE: ICD-10-CM

## 2019-01-24 ASSESSMENT — MIFFLIN-ST. JEOR: SCORE: 2108.06

## 2019-01-25 ENCOUNTER — COMMUNICATION - HEALTHEAST (OUTPATIENT)
Dept: INTERNAL MEDICINE | Facility: CLINIC | Age: 39
End: 2019-01-25

## 2019-02-20 ENCOUNTER — COMMUNICATION - HEALTHEAST (OUTPATIENT)
Dept: ENDOCRINOLOGY | Facility: CLINIC | Age: 39
End: 2019-02-20

## 2019-02-21 ENCOUNTER — COMMUNICATION - HEALTHEAST (OUTPATIENT)
Dept: ENDOCRINOLOGY | Facility: CLINIC | Age: 39
End: 2019-02-21

## 2019-02-28 ENCOUNTER — COMMUNICATION - HEALTHEAST (OUTPATIENT)
Dept: ENDOCRINOLOGY | Facility: CLINIC | Age: 39
End: 2019-02-28

## 2019-02-28 DIAGNOSIS — E11.9 DIABETES MELLITUS TYPE 2, UNCOMPLICATED (H): ICD-10-CM

## 2019-02-28 DIAGNOSIS — E11.9 DIABETES (H): ICD-10-CM

## 2019-03-13 ENCOUNTER — COMMUNICATION - HEALTHEAST (OUTPATIENT)
Dept: INTERNAL MEDICINE | Facility: CLINIC | Age: 39
End: 2019-03-13

## 2019-03-15 ENCOUNTER — AMBULATORY - HEALTHEAST (OUTPATIENT)
Dept: LAB | Facility: CLINIC | Age: 39
End: 2019-03-15

## 2019-03-15 DIAGNOSIS — Z11.3 SCREEN FOR STD (SEXUALLY TRANSMITTED DISEASE): ICD-10-CM

## 2019-03-15 DIAGNOSIS — Z79.4 TYPE 2 DIABETES MELLITUS WITHOUT COMPLICATION, WITH LONG-TERM CURRENT USE OF INSULIN (H): ICD-10-CM

## 2019-03-15 DIAGNOSIS — E11.9 TYPE 2 DIABETES MELLITUS WITHOUT COMPLICATION, WITH LONG-TERM CURRENT USE OF INSULIN (H): ICD-10-CM

## 2019-03-15 LAB
ALBUMIN SERPL-MCNC: 3.9 G/DL (ref 3.5–5)
ALP SERPL-CCNC: 72 U/L (ref 45–120)
ALT SERPL W P-5'-P-CCNC: <9 U/L (ref 0–45)
AST SERPL W P-5'-P-CCNC: 16 U/L (ref 0–40)
BILIRUB DIRECT SERPL-MCNC: 0.3 MG/DL
BILIRUB SERPL-MCNC: 0.6 MG/DL (ref 0–1)
CHOLEST SERPL-MCNC: 125 MG/DL
FASTING STATUS PATIENT QL REPORTED: YES
HDLC SERPL-MCNC: 52 MG/DL
HIV 1+2 AB+HIV1 P24 AG SERPL QL IA: NEGATIVE
LDLC SERPL CALC-MCNC: 53 MG/DL
PROT SERPL-MCNC: 6.9 G/DL (ref 6–8)
TRIGL SERPL-MCNC: 100 MG/DL

## 2019-03-16 LAB — T PALLIDUM AB SER QL: NEGATIVE

## 2019-03-18 ENCOUNTER — COMMUNICATION - HEALTHEAST (OUTPATIENT)
Dept: ENDOCRINOLOGY | Facility: CLINIC | Age: 39
End: 2019-03-18

## 2019-03-18 DIAGNOSIS — E11.9 DIABETES MELLITUS TYPE 2, UNCOMPLICATED (H): ICD-10-CM

## 2019-03-18 LAB
C TRACH DNA SPEC QL PROBE+SIG AMP: NEGATIVE
N GONORRHOEA DNA SPEC QL NAA+PROBE: NEGATIVE

## 2019-03-22 ENCOUNTER — COMMUNICATION - HEALTHEAST (OUTPATIENT)
Dept: INTERNAL MEDICINE | Facility: CLINIC | Age: 39
End: 2019-03-22

## 2019-04-03 ENCOUNTER — COMMUNICATION - HEALTHEAST (OUTPATIENT)
Dept: ENDOCRINOLOGY | Facility: CLINIC | Age: 39
End: 2019-04-03

## 2019-04-03 DIAGNOSIS — E11.9 DIABETES MELLITUS TYPE 2, UNCOMPLICATED (H): ICD-10-CM

## 2019-04-08 ENCOUNTER — COMMUNICATION - HEALTHEAST (OUTPATIENT)
Dept: ENDOCRINOLOGY | Facility: CLINIC | Age: 39
End: 2019-04-08

## 2019-04-08 DIAGNOSIS — Z79.4 TYPE 2 DIABETES MELLITUS WITHOUT COMPLICATION, WITH LONG-TERM CURRENT USE OF INSULIN (H): ICD-10-CM

## 2019-04-08 DIAGNOSIS — E11.9 TYPE 2 DIABETES MELLITUS WITHOUT COMPLICATION, WITH LONG-TERM CURRENT USE OF INSULIN (H): ICD-10-CM

## 2019-04-23 ENCOUNTER — COMMUNICATION - HEALTHEAST (OUTPATIENT)
Dept: INTERNAL MEDICINE | Facility: CLINIC | Age: 39
End: 2019-04-23

## 2019-05-10 ENCOUNTER — PRE VISIT (OUTPATIENT)
Dept: UROLOGY | Facility: CLINIC | Age: 39
End: 2019-05-10

## 2019-05-10 NOTE — TELEPHONE ENCOUNTER
Reason for visit: cystoscopy        Relevant information: 1 year post urethroplasty     Records/imaging/labs: all records available     Pt called: yes     Rooming: flow/pvr

## 2019-05-15 ENCOUNTER — COMMUNICATION - HEALTHEAST (OUTPATIENT)
Dept: ENDOCRINOLOGY | Facility: CLINIC | Age: 39
End: 2019-05-15

## 2019-05-15 DIAGNOSIS — E11.9 DIABETES MELLITUS TYPE 2, UNCOMPLICATED (H): ICD-10-CM

## 2019-05-20 ENCOUNTER — RECORDS - HEALTHEAST (OUTPATIENT)
Dept: ADMINISTRATIVE | Facility: OTHER | Age: 39
End: 2019-05-20

## 2019-05-20 ENCOUNTER — OFFICE VISIT (OUTPATIENT)
Dept: UROLOGY | Facility: CLINIC | Age: 39
End: 2019-05-20
Payer: COMMERCIAL

## 2019-05-20 VITALS
SYSTOLIC BLOOD PRESSURE: 125 MMHG | HEART RATE: 77 BPM | DIASTOLIC BLOOD PRESSURE: 78 MMHG | WEIGHT: 272 LBS | BODY MASS INDEX: 38.94 KG/M2 | HEIGHT: 70 IN

## 2019-05-20 DIAGNOSIS — Z87.448 HISTORY OF URETHRAL STRICTURE: Primary | ICD-10-CM

## 2019-05-20 ASSESSMENT — PAIN SCALES - GENERAL
PAINLEVEL: NO PAIN (0)
PAINLEVEL: NO PAIN (0)

## 2019-05-20 ASSESSMENT — MIFFLIN-ST. JEOR: SCORE: 2155.03

## 2019-05-20 NOTE — PATIENT INSTRUCTIONS
"Return in one year with a full bladder for a urine flow test.    It was a pleasure meeting with you today.  Thank you for allowing me and my team the privilege of caring for you today.  YOU are the reason we are here, and I truly hope we provided you with the excellent service you deserve.  Please let us know if there is anything else we can do for you so that we can be sure you are leaving completely satisfied with your care experience.          AFTER YOUR CYSTOSCOPY        You have just completed a cystoscopy, or \"cysto\", which allowed your physician to learn more about your bladder (or to remove a stent placed after surgery). We suggest that you continue to avoid caffeine, fruit juice, and alcohol for the next 24 hours, however, you are encouraged to return to your normal activities.         A few things that are considered normal after your cystoscopy:     * Small amount of bleeding (or spotting) that clears within the next 24 hours     * Slight burning sensation with urination     * Sensation to of needing to avoid more frequently     * The feeling of \"air\" in your urine     * Mild discomfort that is relieved with Tylenol        Please contact our office promptly if you:     * Develop a fever above 101 degrees     * Are unable to urinate     * Develop bright red blood that does not stop     * Severe pain or swelling         Please contact our office with any concerns or questions @[email or web address].  "

## 2019-05-20 NOTE — NURSING NOTE
"Chief Complaint   Patient presents with     Cystoscopy     3 month post urethroplasty       Blood pressure 125/78, pulse 77, height 1.778 m (5' 10\"), weight 123.4 kg (272 lb). Body mass index is 39.03 kg/m .    Patient Active Problem List   Diagnosis     Urethral stricture       Allergies   Allergen Reactions     Liraglutide      Stomach upset, vomiting      Penicillins        Current Outpatient Medications   Medication Sig Dispense Refill     acetaminophen (TYLENOL) 325 MG tablet Take 1-2 tablets (325-650 mg) by mouth every 6 hours as needed for mild pain 45 tablet 0     blood glucose monitoring (MARYURI CONTOUR) test strip Dispense test strips covered by the patient insurance. Test 3 times per day.       empagliflozin (JARDIANCE) 10 MG TABS tablet Take 10 mg by mouth       Insulin Glargine (LANTUS SC) Inject 70 Units Subcutaneous At Bedtime       insulin lispro (HUMALOG PEN) 100 UNIT/ML injection Inject 8 units with breakfast and lunch and 10 units with supper.       insulin pen needle (B-D U/F) 31G X 8 MM FOR ADMINISTERING INSULIN (3 HUMALOG AND 1 LANTUS)       lisinopril (PRINIVIL/ZESTRIL) 10 MG tablet Take 10 mg by mouth daily (with dinner)        loratadine (CLARITIN) 10 MG tablet Take 10 mg by mouth       metFORMIN (GLUCOPHAGE-XR) 500 MG 24 hr tablet Take 1,000 mg by mouth daily (with dinner)        Multiple Vitamins-Minerals (MULTI COMPLETE PO)        simvastatin (ZOCOR) 20 MG tablet Take 20 mg by mouth       tadalafil (CIALIS) 10 MG tablet Take 10 mg by mouth         Social History     Tobacco Use     Smoking status: Never Smoker     Smokeless tobacco: Never Used   Substance Use Topics     Alcohol use: Yes     Comment: 2-3/month     Drug use: No       LISA Rivas  2019  4:03 PM     Invasive Procedure Safety Checklist:    Procedure: Cystoscopy    Action: Complete sections and checkboxes as appropriate.    Pre-procedure:  1. Patient ID Verified with 2 identifiers (Mayra and  or MRN) : YES    2. " Procedure and site verified with patient/designee (when able) : YES    3. Accurate consent documentation in medical record : YES    4. H&P (or appropriate assessment) documented in medical record : YES  H&P must be up to 30 days prior to procedure an updated within 24 hours of                 Procedure as applicable.     5. Relevant diagnostic and radiology test results appropriately labeled and displayed as applicable : YES    6. Blood products, implants, devices, and/or special equipment available for the procedure as applicable : YES    7. Procedure site(s) marked with provider initials [Exclusions: None] : NO    8. Marking not required. Reason : Yes  Procedure does not require site marking    Time Out:     Time-Out performed immediately prior to starting procedure, including verbal and active participation of all team members addressing: YES    1. Correct patient identity.  2. Confirmed that the correct side and site are marked.  3. An accurate procedure to be done.  4. Agreement on the procedure to be done.  5. Correct patient position.  6. Relevant images and results are properly labeled and appropriately displayed.  7. The need to administer antibiotics or fluids for irrigation purposes during the procedure as applicable.  8. Safety precautions based on patient history or medication use.    During Procedure: Verification of correct person, site, and procedure occurs any time the responsibility for care of the patient is transferred to another member of the care team.      Parisa Osman CMA  May 20, 2019

## 2019-05-20 NOTE — LETTER
"2019     RE: Clarence Boyle  [id number]  [facility] [address]     Dear Colleague,    Thank you for referring your patient, Clarence Boyle, to the [facility] UROLOGY AND INST FOR PROSTATE AND UROLOGIC CANCERS at [facility]. Please see a copy of my visit note below.      Urology Clinic    Herb Awad MD  Date of Service: 2019     Name: Clarence Boyle  MRN: [id number]  Age: 39 year old  : 1980  Referring provider: Marcos Jo     Assessment and Plan:  1. (1 year follow up) s/p buccal graft dorsal onlay urethroplasty for urethral stricture (2018). No evidence of stricture. 24 Fr throughout.     2. Incontinence/urinary dribbling:    Post-void dribbling. I showed the patient how to milk out any retained urine.     3. Sexual Health:   Weak ejaculate and premature ejaculatation. Weak ejaculate may be related to the dribbling and probably won't improve further.   We discussed Viagra and or EMLA cream for premature ejaculations.     --F/U as needed   --Remotely contact the patient   ______________________________________________________________________    HPI  Clarence Boyle is a 39 year old male with a history of bulbar urethral stricture refractory to minimally invasive management s/p buccal graft dorsal onlay urethroplasty for urethral stricture (2018). The patient had a moderate size scrotal hematoma and bruising in the suprapubic region a few weeks after surgery. He is better currently.     Today, the patient states that his flow is good currently. He endorses some intermittent and \"random\" incontinence.      DESCRIPTION OF PROCEDURE:  After informed consent was obtained, the patient was brought to the procedure room where he was placed in the supine position with all pressure points well padded.  The penis and scrotum were prepped and draped in a sterile fashion. A flexible cystoscope was introduced through a well-lubricated urethra.  The anterior " urethra was free of stricture. The urinary sphincter was intact. Scope passed through easily, 24 Guyanese throughout. The flexible cystoscope was removed and the findings were described to the patient.     Urinary Flow Rate (05/20/2019)  Peak Flow: 32.4 mL/s  Average Flow: 18.2 mL/s  Voided (mL): 377 mL  Residual Volume by Ultrasound: 56 mL    VINCENT:   17/25  MSHQ:  16/25    08/06/2018: Urinary Flow Rate  Peak Flow: 27.7 mL/s  Average Flow: 14.1 mL/s  Voided (mL): 226 mL   PVR: 82    Review of Systems:   Pertinent items are noted in HPI or as below, remainder of complete ROS is negative.      Laboratory:   I reviewed all applicable laboratory and pathology data and went over findings with patient  Significant for     Lab Results   Component Value Date    CR 0.68 08/24/2018     Scribe Disclosure:  I, Anup Santos, am serving as a scribe to document services personally performed by Herb Awad MD at this visit, based upon the provider's statements to me. All documentation has been reviewed by the aforementioned provider prior to being entered into the official medical record.     CC  Patient Care Team:  Ruby Brar Do, DO as PCP - General  Rhett Richmond MD as Referring Physician (Urology)  Herb Awad MD as MD (Urology)  Megan Villareal, RN as Registered Nurse  RUBY BRAR DO    Copy to patient  ARPIT REA  [id number]  [place] [address]

## 2019-05-20 NOTE — PROGRESS NOTES
"  Urology Clinic    Herb Awad MD  Date of Service: 2019     Name: Clarence Boyle  MRN: [id number]  Age: 39 year old  : 1980  Referring provider: Marcos Jo     Assessment and Plan:  1. (1 year follow up) s/p buccal graft dorsal onlay urethroplasty for urethral stricture (2018). No evidence of stricture. 24 Fr throughout.     2. Incontinence/urinary dribbling:    Post-void dribbling. I showed the patient how to milk out any retained urine.     3. Sexual Health:   Weak ejaculate and premature ejaculatation. Weak ejaculate may be related to the dribbling and probably won't improve further.   We discussed Viagra and or EMLA cream for premature ejaculations.     --F/U as needed   --Remotely contact the patient   ______________________________________________________________________    HPI  Clarence Boyle is a 39 year old male with a history of bulbar urethral stricture refractory to minimally invasive management s/p buccal graft dorsal onlay urethroplasty for urethral stricture (2018). The patient had a moderate size scrotal hematoma and bruising in the suprapubic region a few weeks after surgery. He is better currently.     Today, the patient states that his flow is good currently. He endorses some intermittent and \"random\" incontinence.      DESCRIPTION OF PROCEDURE:  After informed consent was obtained, the patient was brought to the procedure room where he was placed in the supine position with all pressure points well padded.  The penis and scrotum were prepped and draped in a sterile fashion. A flexible cystoscope was introduced through a well-lubricated urethra.  The anterior urethra was free of stricture. The urinary sphincter was intact. Scope passed through easily, 24 French throughout. The flexible cystoscope was removed and the findings were described to the patient.     Urinary Flow Rate (2019)  Peak Flow: 32.4 mL/s  Average Flow: 18.2 mL/s  Voided (mL): 377 " mL  Residual Volume by Ultrasound: 56 mL    VINCENT:   17/25  MSHQ:  16/25 08/06/2018: Urinary Flow Rate  Peak Flow: 27.7 mL/s  Average Flow: 14.1 mL/s  Voided (mL): 226 mL   PVR: 82    Review of Systems:   Pertinent items are noted in HPI or as below, remainder of complete ROS is negative.      Laboratory:   I reviewed all applicable laboratory and pathology data and went over findings with patient  Significant for     Lab Results   Component Value Date    CR 0.68 08/24/2018     Scribe Disclosure:  I, Anup Santos, am serving as a scribe to document services personally performed by Herb Awad MD at this visit, based upon the provider's statements to me. All documentation has been reviewed by the aforementioned provider prior to being entered into the official medical record.     CC  Patient Care Team:  Ruby Brar Do, DO as PCP - General  Rhett Richmond MD as Referring Physician (Urology)  Herb Awad MD as MD (Urology)  Megan Villareal, RN as Registered Nurse  RUBY BRAR DO    Copy to patient  ARPIT REA  [id number]  [facility] [place] [address]

## 2019-05-22 ENCOUNTER — COMMUNICATION - HEALTHEAST (OUTPATIENT)
Dept: ENDOCRINOLOGY | Facility: CLINIC | Age: 39
End: 2019-05-22

## 2019-05-22 DIAGNOSIS — E11.9 DIABETES MELLITUS TYPE 2, UNCOMPLICATED (H): ICD-10-CM

## 2019-05-23 ENCOUNTER — COMMUNICATION - HEALTHEAST (OUTPATIENT)
Dept: ENDOCRINOLOGY | Facility: CLINIC | Age: 39
End: 2019-05-23

## 2019-05-23 DIAGNOSIS — E11.9 DIABETES MELLITUS TYPE 2, UNCOMPLICATED (H): ICD-10-CM

## 2019-06-06 ENCOUNTER — COMMUNICATION - HEALTHEAST (OUTPATIENT)
Dept: INTERNAL MEDICINE | Facility: CLINIC | Age: 39
End: 2019-06-06

## 2019-06-10 ENCOUNTER — RECORDS - HEALTHEAST (OUTPATIENT)
Dept: ADMINISTRATIVE | Facility: OTHER | Age: 39
End: 2019-06-10

## 2019-06-14 ENCOUNTER — COMMUNICATION - HEALTHEAST (OUTPATIENT)
Dept: INTERNAL MEDICINE | Facility: CLINIC | Age: 39
End: 2019-06-14

## 2019-06-14 DIAGNOSIS — Z79.4 TYPE 2 DIABETES MELLITUS WITHOUT COMPLICATION, WITH LONG-TERM CURRENT USE OF INSULIN (H): ICD-10-CM

## 2019-06-14 DIAGNOSIS — E11.9 TYPE 2 DIABETES MELLITUS WITHOUT COMPLICATION, WITH LONG-TERM CURRENT USE OF INSULIN (H): ICD-10-CM

## 2019-06-19 ENCOUNTER — RECORDS - HEALTHEAST (OUTPATIENT)
Dept: HEALTH INFORMATION MANAGEMENT | Facility: CLINIC | Age: 39
End: 2019-06-19

## 2019-06-20 ENCOUNTER — TELEPHONE (OUTPATIENT)
Dept: ENDOCRINOLOGY | Facility: CLINIC | Age: 39
End: 2019-06-20

## 2019-06-20 NOTE — TELEPHONE ENCOUNTER
Incoming [facility] referral through [facility] for diabetes. Please schedule 1st available NDI.

## 2019-07-09 ENCOUNTER — COMMUNICATION - HEALTHEAST (OUTPATIENT)
Dept: ENDOCRINOLOGY | Facility: CLINIC | Age: 39
End: 2019-07-09

## 2019-07-09 DIAGNOSIS — Z79.4 TYPE 2 DIABETES MELLITUS WITHOUT COMPLICATION, WITH LONG-TERM CURRENT USE OF INSULIN (H): ICD-10-CM

## 2019-07-09 DIAGNOSIS — E11.9 DIABETES MELLITUS TYPE 2, UNCOMPLICATED (H): ICD-10-CM

## 2019-07-09 DIAGNOSIS — E11.9 TYPE 2 DIABETES MELLITUS WITHOUT COMPLICATION, WITH LONG-TERM CURRENT USE OF INSULIN (H): ICD-10-CM

## 2019-07-11 ENCOUNTER — COMMUNICATION - HEALTHEAST (OUTPATIENT)
Dept: INTERNAL MEDICINE | Facility: CLINIC | Age: 39
End: 2019-07-11

## 2019-07-11 DIAGNOSIS — E11.9 DIABETES MELLITUS TYPE 2, UNCOMPLICATED (H): ICD-10-CM

## 2019-08-20 NOTE — TELEPHONE ENCOUNTER
RECORDS RECEIVED FROM: [facility]   DATE RECEIVED: 8/20/19   NOTES (FOR ALL VISITS) STATUS DETAILS   OFFICE NOTES from referring provider [facility]   OFFICE NOTES from other specialist Epic/ Uro: Ritesh - 5/20/19  IM: Maykel - 1/24/19 ([facility])  Endoc: Fermin - 8/8/17 ([facility])    ED NOTES NA    OPERATIVE REPORT  (thyroid, pituitary, adrenal, parathyroid) [facility] - 7/11/19   MEDICATION LIST     IMAGING      DEXASCAN NA    MRI (BRAIN) PACS 8/24/18   XR (Chest) NA    CT (HEAD/NECK/CHEST/ABDOMEN) NA    NUCLEAR  NA    ULTRASOUND (HEAD/NECK) NA    LABS     DIABETES: HBGA1C, CREATININE, FASTING LIPIDS, MICROALBUMIN URINE, POTASSIUM, TSH, T4    THYROID: TSH, T4, CBC, THYRODLONULIN, TOTAL T3, FREE T4, CALCITONIN, CEA Epic/ 3/15/19

## 2019-09-05 ENCOUNTER — COMMUNICATION - HEALTHEAST (OUTPATIENT)
Dept: ENDOCRINOLOGY | Facility: CLINIC | Age: 39
End: 2019-09-05

## 2019-09-05 DIAGNOSIS — E11.9 DIABETES (H): ICD-10-CM

## 2019-09-20 ASSESSMENT — ENCOUNTER SYMPTOMS
DISTURBANCES IN COORDINATION: 0
SEIZURES: 0
PARALYSIS: 0
TREMORS: 0
TINGLING: 1
DIZZINESS: 0
NUMBNESS: 0
WEAKNESS: 0
SPEECH CHANGE: 0
MEMORY LOSS: 0
HEADACHES: 1
LOSS OF CONSCIOUSNESS: 0

## 2019-09-23 ENCOUNTER — OFFICE VISIT (OUTPATIENT)
Dept: ENDOCRINOLOGY | Facility: CLINIC | Age: 39
End: 2019-09-23
Payer: COMMERCIAL

## 2019-09-23 ENCOUNTER — PRE VISIT (OUTPATIENT)
Dept: ENDOCRINOLOGY | Facility: CLINIC | Age: 39
End: 2019-09-23

## 2019-09-23 ENCOUNTER — RECORDS - HEALTHEAST (OUTPATIENT)
Dept: ADMINISTRATIVE | Facility: OTHER | Age: 39
End: 2019-09-23

## 2019-09-23 VITALS
WEIGHT: 278.7 LBS | HEIGHT: 70 IN | SYSTOLIC BLOOD PRESSURE: 137 MMHG | BODY MASS INDEX: 39.9 KG/M2 | DIASTOLIC BLOOD PRESSURE: 90 MMHG | HEART RATE: 78 BPM

## 2019-09-23 DIAGNOSIS — B35.3 TINEA PEDIS OF BOTH FEET: ICD-10-CM

## 2019-09-23 DIAGNOSIS — E11.9 TYPE 2 DIABETES MELLITUS WITHOUT COMPLICATION, UNSPECIFIED WHETHER LONG TERM INSULIN USE (H): Primary | ICD-10-CM

## 2019-09-23 DIAGNOSIS — E66.01 MORBID OBESITY (H): ICD-10-CM

## 2019-09-23 DIAGNOSIS — E11.9 TYPE 2 DIABETES MELLITUS WITHOUT COMPLICATION, UNSPECIFIED WHETHER LONG TERM INSULIN USE (H): ICD-10-CM

## 2019-09-23 LAB
ALBUMIN SERPL-MCNC: 3.7 G/DL (ref 3.4–5)
ALP SERPL-CCNC: 82 U/L (ref 40–150)
ALT SERPL W P-5'-P-CCNC: 16 U/L (ref 0–70)
ANION GAP SERPL CALCULATED.3IONS-SCNC: 5 MMOL/L (ref 3–14)
AST SERPL W P-5'-P-CCNC: 17 U/L (ref 0–45)
BILIRUB SERPL-MCNC: 0.4 MG/DL (ref 0.2–1.3)
BUN SERPL-MCNC: 17 MG/DL (ref 7–30)
CALCIUM SERPL-MCNC: 8.8 MG/DL (ref 8.5–10.1)
CHLORIDE SERPL-SCNC: 106 MMOL/L (ref 94–109)
CO2 SERPL-SCNC: 26 MMOL/L (ref 20–32)
CREAT SERPL-MCNC: 0.63 MG/DL (ref 0.66–1.25)
CREAT UR-MCNC: 64 MG/DL
GFR SERPL CREATININE-BSD FRML MDRD: >90 ML/MIN/{1.73_M2}
GLUCOSE SERPL-MCNC: 145 MG/DL (ref 70–99)
HBA1C MFR BLD: 7.5 % (ref 4.3–6)
HGB BLD-MCNC: 15 G/DL (ref 13.3–17.7)
MICROALBUMIN UR-MCNC: <5 MG/L
MICROALBUMIN/CREAT UR: NORMAL MG/G CR (ref 0–17)
POTASSIUM SERPL-SCNC: 3.7 MMOL/L (ref 3.4–5.3)
PROT SERPL-MCNC: 7.6 G/DL (ref 6.8–8.8)
SODIUM SERPL-SCNC: 137 MMOL/L (ref 133–144)

## 2019-09-23 RX ORDER — METFORMIN HCL 500 MG
2000 TABLET, EXTENDED RELEASE 24 HR ORAL
Qty: 360 TABLET | Refills: 3 | Status: SHIPPED | OUTPATIENT
Start: 2019-09-23 | End: 2020-10-12

## 2019-09-23 RX ORDER — LISINOPRIL 10 MG/1
10 TABLET ORAL
Qty: 90 TABLET | Refills: 3 | Status: SHIPPED | OUTPATIENT
Start: 2019-09-23 | End: 2020-10-12

## 2019-09-23 RX ORDER — NALTREXONE HYDROCHLORIDE AND BUPROPION HYDROCHLORIDE 8; 90 MG/1; MG/1
TABLET, EXTENDED RELEASE ORAL
Qty: 120 TABLET | Refills: 3 | Status: SHIPPED | OUTPATIENT
Start: 2019-09-23 | End: 2022-03-02

## 2019-09-23 RX ORDER — SIMVASTATIN 20 MG
20 TABLET ORAL DAILY
Qty: 90 TABLET | Refills: 3 | Status: SHIPPED | OUTPATIENT
Start: 2019-09-23 | End: 2020-10-12

## 2019-09-23 ASSESSMENT — MIFFLIN-ST. JEOR: SCORE: 2185.42

## 2019-09-23 ASSESSMENT — PAIN SCALES - GENERAL: PAINLEVEL: NO PAIN (0)

## 2019-09-23 NOTE — PROGRESS NOTES
Reason for visit/consult: Type 2 DM    Primary care provider: Marcos Brar    HPI:  Clarence is a 40 yo male with T2DM (>10 years) with morbid obesity who was last seen by Dr. Christensen 2/12/18. He is taking Lantus 70 units daily, Humalog 8-15 units with meals (varies with what he eats). He is taking Jardiance 10 mg daily now (took farxiga in the past), Metformin XR #4 tabs of 500 mg daily. He got sick on Victoza. He is taking Zocor 20 mg daily, Lisinopril 10 mg daily. He has put on weight over the past couple of years. He has been mor active over the past few months. He recently joined [employer].  A1c today is 7.5%. he does not have any complaints today. He does want help with weight loss.    Meter download data is as follows:  Highest: 320   Average: 179   Std. Dev.: 78   Lowest: 83   # tests: 12   # hypoglycemic:0   # above target: 4     Patient Active Problem List   Diagnosis     Diabetes mellitus type 2, uncomplicated (H)     HTN (hypertension)     Hyperlipidemia     H/O urethral stricture     ED (erectile dysfunction)     Dribbling urine     Past Medical History:   Diagnosis Date     Diabetes mellitus (H)     Genital herpes     Past Medical/Surgical History:  Past Medical History:   Diagnosis Date     Diabetes (H)      Hypertension      Obese      Past Surgical History:   Procedure Laterality Date     URETHROPLASTY STAGE ONE WITH BUCCAL GRAFT N/A 5/2/2018    Procedure: URETHROPLASTY STAGE ONE WITH BUCCAL GRAFT;  Posterior Urethroplasty with Single Buccal Mucosa Graft;  Surgeon: Herb Awad MD;  Location: [facility] OR       Allergies:  Allergies   Allergen Reactions     Liraglutide      Stomach upset, vomiting      Penicillins        Current Outpatient Medications:      acetaminophen (TYLENOL) 325 MG tablet, Take 1-2 tablets (325-650 mg) by mouth every 6 hours as needed for mild pain, Disp: 45 tablet, Rfl: 0     blood glucose monitoring (MARYURI CONTOUR) test strip, Dispense test strips covered by the  "patient insurance. Test 3 times per day., Disp: , Rfl:      empagliflozin (JARDIANCE) 10 MG TABS tablet, Take 10 mg by mouth, Disp: , Rfl:      Insulin Glargine (LANTUS SC), Inject 70 Units Subcutaneous At Bedtime, Disp: , Rfl:      insulin lispro (HUMALOG PEN) 100 UNIT/ML injection, 200 Units , Disp: , Rfl:      insulin pen needle (B-D U/F) 31G X 8 MM, FOR ADMINISTERING INSULIN (3 HUMALOG AND 1 LANTUS), Disp: , Rfl:      lisinopril (PRINIVIL/ZESTRIL) 10 MG tablet, Take 10 mg by mouth daily (with dinner) , Disp: , Rfl:      loratadine (CLARITIN) 10 MG tablet, Take 10 mg by mouth, Disp: , Rfl:      metFORMIN (GLUCOPHAGE-XR) 500 MG 24 hr tablet, Take 2,000 mg by mouth daily (with dinner) , Disp: , Rfl:      Multiple Vitamins-Minerals (MULTI COMPLETE PO), , Disp: , Rfl:      simvastatin (ZOCOR) 20 MG tablet, Take 20 mg by mouth, Disp: , Rfl:      tadalafil (CIALIS) 10 MG tablet, Take 10 mg by mouth, Disp: , Rfl:       Family History:    Cancer Mother   Gynecologic     Thyroid disease Mother (hypothyroidism)    No Medical Problems Father     Cancer Maternal Grandmother     Diabetes Paternal Grandmother     Mental illness Paternal Grandmother     Colon cancer Cousin   Approximately late 30s     Social History:  Social History     Tobacco Use     Smoking status: Never Smoker     Smokeless tobacco: Never Used   Substance Use Topics     Alcohol use: Yes     Comment: 2-3/month     Additional Social History: 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 His wife works for [employer].     ROS:  negative except as mentioned in HPI or in list below    Exam  BP (!) 137/90   Pulse 78   Ht 1.778 m (5' 10\")   Wt 126.4 kg (278 lb 11.2 oz)   BMI 39.99 kg/m    Gen: well appearing, nad, pleasant and conversant  HEENT: anicteric, EOMI, no proptosis or lid lag, no goiter  Ext: no swelling or edema  Feet: no deformities or ulcers, 2+ DP pulses, normal monofilament sensation. Left great toenail is slightly dystrophic. + superficial " peeling on both feet indicative of tinea pedis  Neuro: A&Ox3, no tremor    Labs/Imaging    From HE in [facility]:  3/15/19:  LDL 53, HDL 52  1/10/19:  Creatinine 0.85  A1c 7.7% (in 7-8.5% range since 2016)    No results found for: A1C  ENDO DIABETES Latest Ref Rng & Units 8/24/2018   HGB 13.3 - 17.7 g/dL 15.6   GLUCOSE 70 - 99 mg/dL 144 (H)   CREATININE 0.66 - 1.25 mg/dL 0.68   POTASSIUM 3.4 - 5.3 mmol/L 4.1   WBC 4.0 - 11.0 10e9/L 7.7   RBC 4.4 - 5.9 10e12/L 5.59   HGB 13.3 - 17.7 g/dL 15.6   HCT 40.0 - 53.0 % 47.3   MCV 78 - 100 fl 85   MCH 26.5 - 33.0 pg 27.9   MCHC 31.5 - 36.5 g/dL 33.0   RDW 10.0 - 15.0 % 14.6    - 450 10e9/L 227     Assessment and Plan  Type 2 DM, reasonable control. Refills given as per patient request. See Patient Instructions for DM mgmt.    Morbid obesity, could benefit from a reduced 1700 calorie meal plan (REE calc is 2182 using MSJ eqn) and structured exercise program. He did not tolerate GLP-1 agonist. Body mass index is 39.99 kg/m . refer to [facility].    Labs: urine microalbumin, CMP, hemoglobin, A1c (POC)    Patient Instructions:    Please go to the lab on 1st floor after clinic today.    Please see Podiatry about your feet fungus.    Please see the eye doctor once a year for diabetes eye exam.    Please follow up with your primary MD about your blood pressure. Goal is <130/80. Yours was 137/90 today.    Please check your blood sugars twice daily in am when fasting and 2 hours after a meal.    Please continue your most of your diabetes medications as you are, but watch for low blood sugars when you begin a low calorie food plan.    Please take Humalog 2 units per 1 unit of carbohydrates (10 grams).    Please use Humalog correction scale of 1 units per 50 above 140. You can take 1/2 of this correction at bedtime.    You could improve and possibly get rid of your diabetes with significant weight loss    Please go to [email or web address] to register as a patient to  guarantee the lowest price for the drug. Click on How To Save link & choose sign up through local pharmacy & print that registration & take it to your pharmacy.    1,700 calorie meal plan to lose 1 lbs weekly without exercise (REE calc is 2182 using MSJ eqn)  Use meal replacements such as Sanjana's meals, Lean Cuisines, Healthy Choice, Smart Ones, Weight Watchers Meals, and Slim Fast and Glucerna shakes and supplement with fresh fruits and vegetables  Please drink a lot of water daily. Most people typically need about 2 liters of water daily and more if they are exercising, have a large weight, or have a fever or illness. Add Crystal Light for flavoring if desired. But no pop with calories in it.  Please keep a food journal of what you eat, calories in what you eat.  Consider using applications for smart phones such as Sennari, SoundCloud, [employer], LoseIt, Tap&Track, and RelaxM.  Focus on wet volumetrics, meaning, eat more foods that are high in water and fiber such as fruits and vegetables in order to get that full feeling. These are also good for your overall health as well.  Check out Dr. Rimma Feliciano from [facility] - she has cookbooks with low calorie volumetric recipes  You can try Let's Dish to help you prepare meals for you and your family. Often times, the caloric and nutrition data and serving sizes are available for this food. This can be a time saving maneuver. The website can give you more information http://www.Zooppa.VOICEPLATE.COM/  [employer] has Let's Dish & fresh low calorie salads  Check out Hello Fresh at https://www.Light Extraction/food-boxes/classic-box/  Try Cooking Light recipes for low calorie meal preparation and planning  Other food plan options you can search for on the internet and check out include: Leatha QUACH, [facility]    Please consider health psychologist to discuss how mood, stress, depression and/or anxiety impact your eating.    Progressively increase physical activity to  60 minutes, including combination of cardio & resistance training x 5 days weekly.    Consider hiring a  to develop a structured progressive workout plan that includes both cardio and resistance training. Obesity is a disease according to the IRS, so you may be able to use some pre-tax dollars from your medical flexible spending account if you get a letter from your doctor and/or fill out a plan-specific form.    RTC: 6 months, Refer pt to [facility]    Jennifer Interiano MD, MPH  Attending Physician  Diabetes/Endocrinology/Metabolism    Time: 60 min spent on evaluation, management, counseling, education, & motivational interviewing with greater than 50% of the total time was spent on counseling and coordinating care        Answers for HPI/ROS submitted by the patient on 9/20/2019   General Symptoms: No  Skin Symptoms: No  HENT Symptoms: No  EYE SYMPTOMS: No  HEART SYMPTOMS: No  LUNG SYMPTOMS: No  INTESTINAL SYMPTOMS: No  URINARY SYMPTOMS: No  REPRODUCTIVE SYMPTOMS: No  SKELETAL SYMPTOMS: No  BLOOD SYMPTOMS: No  NERVOUS SYSTEM SYMPTOMS: Yes  MENTAL HEALTH SYMPTOMS: No  Trouble with coordination: No  Dizziness or trouble with balance: No  Fainting or black-out spells: No  Memory loss: No  Headache: Yes  Seizures: No  Speech problems: No  Tingling: Yes  Tremor: No  Weakness: No  Difficulty walking: No  Paralysis: No  Numbness: No

## 2019-09-23 NOTE — LETTER
9/23/2019       RE: Clarence Boyle  [address]  [facility] [address]     Dear Colleague,    Thank you for referring your patient, Clarence Boyle, to the [facility] at [facility]. Please see a copy of my visit note below.    Reason for visit/consult: Type 2 DM    Primary care provider: Marcos Brar    HPI:  Clarence is a 40 yo male with T2DM (>10 years) with morbid obesity who was last seen by Dr. Christensen 2/12/18. He is taking Lantus 70 units daily, Humalog 8-15 units with meals (varies with what he eats). He is taking Jardiance 10 mg daily now (took farxiga in the past), Metformin XR #4 tabs of 500 mg daily. He got sick on Victoza. He is taking Zocor 20 mg daily, Lisinopril 10 mg daily. He has put on weight over the past couple of years. He has been mor active over the past few months. He recently joined [employer].  A1c today is 7.5%. he does not have any complaints today. He does want help with weight loss.    Meter download data is as follows:  Highest: 320   Average: 179   Std. Dev.: 78   Lowest: 83   # tests: 12   # hypoglycemic:0   # above target: 4     Patient Active Problem List   Diagnosis     Diabetes mellitus type 2, uncomplicated (H)     HTN (hypertension)     Hyperlipidemia     H/O urethral stricture     ED (erectile dysfunction)     Dribbling urine     Past Medical History:   Diagnosis Date     Diabetes mellitus (H)     Genital herpes     Past Medical/Surgical History:  Past Medical History:   Diagnosis Date     Diabetes (H)      Hypertension      Obese      Past Surgical History:   Procedure Laterality Date     URETHROPLASTY STAGE ONE WITH BUCCAL GRAFT N/A 5/2/2018    Procedure: URETHROPLASTY STAGE ONE WITH BUCCAL GRAFT;  Posterior Urethroplasty with Single Buccal Mucosa Graft;  Surgeon: Herb Awad MD;  Location:  OR       Allergies:  Allergies   Allergen Reactions     Liraglutide      Stomach upset, vomiting      Penicillins        Current  "Outpatient Medications:      acetaminophen (TYLENOL) 325 MG tablet, Take 1-2 tablets (325-650 mg) by mouth every 6 hours as needed for mild pain, Disp: 45 tablet, Rfl: 0     blood glucose monitoring (MARYURI CONTOUR) test strip, Dispense test strips covered by the patient insurance. Test 3 times per day., Disp: , Rfl:      empagliflozin (JARDIANCE) 10 MG TABS tablet, Take 10 mg by mouth, Disp: , Rfl:      Insulin Glargine (LANTUS SC), Inject 70 Units Subcutaneous At Bedtime, Disp: , Rfl:      insulin lispro (HUMALOG PEN) 100 UNIT/ML injection, 200 Units , Disp: , Rfl:      insulin pen needle (B-D U/F) 31G X 8 MM, FOR ADMINISTERING INSULIN (3 HUMALOG AND 1 LANTUS), Disp: , Rfl:      lisinopril (PRINIVIL/ZESTRIL) 10 MG tablet, Take 10 mg by mouth daily (with dinner) , Disp: , Rfl:      loratadine (CLARITIN) 10 MG tablet, Take 10 mg by mouth, Disp: , Rfl:      metFORMIN (GLUCOPHAGE-XR) 500 MG 24 hr tablet, Take 2,000 mg by mouth daily (with dinner) , Disp: , Rfl:      Multiple Vitamins-Minerals (MULTI COMPLETE PO), , Disp: , Rfl:      simvastatin (ZOCOR) 20 MG tablet, Take 20 mg by mouth, Disp: , Rfl:      tadalafil (CIALIS) 10 MG tablet, Take 10 mg by mouth, Disp: , Rfl:       Family History:    Cancer Mother   Gynecologic     Thyroid disease Mother (hypothyroidism)    No Medical Problems Father     Cancer Maternal Grandmother     Diabetes Paternal Grandmother     Mental illness Paternal Grandmother     Colon cancer Cousin   Approximately late 30s     Social History:  Social History     Tobacco Use     Smoking status: Never Smoker     Smokeless tobacco: Never Used   Substance Use Topics     Alcohol use: Yes     Comment: 2-3/month     Additional Social History: 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     ROS:  negative except as mentioned in HPI or in list below    Exam  BP (!) 137/90   Pulse 78   Ht 1.778 m (5' 10\")   Wt 126.4 kg (278 lb 11.2 oz)   BMI 39.99 kg/m     Gen: well " appearing, nad, pleasant and conversant  HEENT: anicteric, EOMI, no proptosis or lid lag, no goiter  Ext: no swelling or edema  Feet: no deformities or ulcers, 2+ DP pulses, normal monofilament sensation. Left great toenail is slightly dystrophic. + superficial peeling on both feet indicative of tinea pedis  Neuro: A&Ox3, no tremor    Labs/Imaging    From HE in [place]:  3/15/19:  LDL 53, HDL 52  1/10/19:  Creatinine 0.85  A1c 7.7% (in 7-8.5% range since 2016)    No results found for: A1C  ENDO DIABETES Latest Ref Rng & Units 8/24/2018   HGB 13.3 - 17.7 g/dL 15.6   GLUCOSE 70 - 99 mg/dL 144 (H)   CREATININE 0.66 - 1.25 mg/dL 0.68   POTASSIUM 3.4 - 5.3 mmol/L 4.1   WBC 4.0 - 11.0 10e9/L 7.7   RBC 4.4 - 5.9 10e12/L 5.59   HGB 13.3 - 17.7 g/dL 15.6   HCT 40.0 - 53.0 % 47.3   MCV 78 - 100 fl 85   MCH 26.5 - 33.0 pg 27.9   MCHC 31.5 - 36.5 g/dL 33.0   RDW 10.0 - 15.0 % 14.6    - 450 10e9/L 227     Assessment and Plan  Type 2 DM, reasonable control. Refills given as per patient request. See Patient Instructions for DM mgmt.    Morbid obesity, could benefit from a reduced 1700 calorie meal plan (REE calc is 2182 using MSJ eqn) and structured exercise program. He did not tolerate GLP-1 agonist. Body mass index is 39.99 kg/m . refer to [facility].    Labs: urine microalbumin, CMP, hemoglobin, A1c (POC)    Patient Instructions:    Please go to the lab on 1st floor after clinic today.    Please see Podiatry about your feet fungus.    Please see the eye doctor once a year for diabetes eye exam.    Please follow up with your primary MD about your blood pressure. Goal is <130/80. Yours was 137/90 today.    Please check your blood sugars twice daily in am when fasting and 2 hours after a meal.    Please continue your most of your diabetes medications as you are, but watch for low blood sugars when you begin a low calorie food plan.    Please take Humalog 2 units per 1 unit of carbohydrates (10  grams).    Please use Humalog correction scale of 1 units per 50 above 140. You can take 1/2 of this correction at bedtime.    You could improve and possibly get rid of your diabetes with significant weight loss    Please go to [email or web address] to register as a patient to guarantee the lowest price for the drug. Click on How To Save link & choose sign up through local pharmacy & print that registration & take it to your pharmacy.    1,700 calorie meal plan to lose 1 lbs weekly without exercise (REE calc is 2182 using MSJ eqn)  Use meal replacements such as Sanjana's meals, Lean Cuisines, Healthy Choice, Smart Ones, Weight Watchers Meals, and Slim Fast and Glucerna shakes and supplement with fresh fruits and vegetables  Please drink a lot of water daily. Most people typically need about 2 liters of water daily and more if they are exercising, have a large weight, or have a fever or illness. Add Crystal Light for flavoring if desired. But no pop with calories in it.  Please keep a food journal of what you eat, calories in what you eat.  Consider using applications for smart phones such as [employer]el OGK-5, Triposo, [place] [employer], LoseIt, Tap&Track, and RelaxM.  Focus on wet volumetrics, meaning, eat more foods that are high in water and fiber such as fruits and vegetables in order to get that full feeling. These are also good for your overall health as well.  Check out Dr. Rimma Feliciano from [facility] - she has cookbooks with low calorie volumetric recipes  You can try Let's Dish to help you prepare meals for you and your family. Often times, the caloric and nutrition data and serving sizes are available for this food. This can be a time saving maneuver. The website can give you more information http://www.Excep Apps.TreSensa/  Cobl[employer]ie[employer] Delivers has Let's Dish & fresh low calorie salads  Check out Hello Fresh at https://www.Flat.to/food-boxes/classic-box/  Try Cooking Light recipes for low calorie meal preparation and  planning  Other food plan options you can search for on the internet and check out include: Leatha QUACH, [facility]    Please consider health psychologist to discuss how mood, stress, depression and/or anxiety impact your eating.    Progressively increase physical activity to 60 minutes, including combination of cardio & resistance training x 5 days weekly.    Consider hiring a  to develop a structured progressive workout plan that includes both cardio and resistance training. Obesity is a disease according to the IRS, so you may be able to use some pre-tax dollars from your medical flexible spending account if you get a letter from your doctor and/or fill out a plan-specific form.    RTC: 6 months, Refer pt to [facility]    Jennifer Interiano MD, MPH  Attending Physician  Diabetes/Endocrinology/Metabolism    Time: 60 min spent on evaluation, management, counseling, education, & motivational interviewing with greater than 50% of the total time was spent on counseling and coordinating care        Answers for HPI/ROS submitted by the patient on 9/20/2019   General Symptoms: No  Skin Symptoms: No  HENT Symptoms: No  EYE SYMPTOMS: No  HEART SYMPTOMS: No  LUNG SYMPTOMS: No  INTESTINAL SYMPTOMS: No  URINARY SYMPTOMS: No  REPRODUCTIVE SYMPTOMS: No  SKELETAL SYMPTOMS: No  BLOOD SYMPTOMS: No  NERVOUS SYSTEM SYMPTOMS: Yes  MENTAL HEALTH SYMPTOMS: No  Trouble with coordination: No  Dizziness or trouble with balance: No  Fainting or black-out spells: No  Memory loss: No  Headache: Yes  Seizures: No  Speech problems: No  Tingling: Yes  Tremor: No  Weakness: No  Difficulty walking: No  Paralysis: No  Numbness: No      Again, thank you for allowing me to participate in the care of your patient.      Sincerely,    Jennifer Interiano MD

## 2019-09-23 NOTE — PATIENT INSTRUCTIONS
Please go to the lab on 1st floor after clinic today.    Please see Podiatry about your feet fungus.    Please see the eye doctor once a year for diabetes eye exam.    Please follow up with your primary MD about your blood pressure. Goal is <130/80. Yours was 137/90 today.    Please check your blood sugars twice daily in am when fasting and 2 hours after a meal.    Please continue your most of your diabetes medications as you are, but watch for low blood sugars when you begin a low calorie food plan.    Please take Humalog 2 units per 1 unit of carbohydrates (10 grams).    Please use Humalog correction scale of 1 units per 50 above 140. You can take 1/2 of this correction at bedtime.    You could improve and possibly get rid of your diabetes with significant weight loss    Please go to [email or web address] to register as a patient to guarantee the lowest price for the drug. Click on How To Save link & choose sign up through local pharmacy & print that registration & take it to your pharmacy.    1,700 calorie meal plan to lose 1 lbs weekly without exercise (REE calc is 2182 using MSJ eqn)  Use meal replacements such as Sanjana's meals, Lean Cuisines, Healthy Choice, Smart Ones, Weight Watchers Meals, and Slim Fast and Glucerna shakes and supplement with fresh fruits and vegetables  Please drink a lot of water daily. Most people typically need about 2 liters of water daily and more if they are exercising, have a large weight, or have a fever or illness. Add Crystal Light for flavoring if desired. But no pop with calories in it.  Please keep a food journal of what you eat, calories in what you eat.  Consider using applications for smart phones such as Wello, Wanjee [employer] and [employer], TravergenceRecipes, LoseIt, Tap&Track, and RelaxM.  Focus on wet volumetrics, meaning, eat more foods that are high in water and fiber such as fruits and vegetables in order to get that full feeling. These are also good for your overall health as well.  Check out  Dr. Rimma Feliciano from [facility] - she has cookbooks with low calorie volumetric recipes  You can try Let's Dish to help you prepare meals for you and your family. Often times, the caloric and nutrition data and serving sizes are available for this food. This can be a time saving maneuver. The website can give you more information http://www.Takes/  Coborn's Delivers has Let's Dish & fresh low calorie salads  Check out Hello Fresh at https://www.Raising IT/food-boxes/classic-box/  Try Cooking Light recipes for low calorie meal preparation and planning  Other food plan options you can search for on the internet and check out include: Leatha QUACH, [facility]    Please consider health psychologist to discuss how mood, stress, depression and/or anxiety impact your eating.    Progressively increase physical activity to 60 minutes, including combination of cardio & resistance training x 5 days weekly.    Consider hiring a  to develop a structured progressive workout plan that includes both cardio and resistance training. Obesity is a disease according to the IRS, so you may be able to use some pre-tax dollars from your medical flexible spending account if you get a letter from your doctor and/or fill out a plan-specific form.

## 2019-09-23 NOTE — LETTER
9/23/2019      RE: Clarence Boyle  [address]  [facility] [address]     Reason for visit/consult: Type 2 DM    Primary care provider: Marcos Brar    HPI:  Clarence is a 38 yo male with T2DM (>10 years) with morbid obesity who was last seen by Dr. Christensen 2/12/18. He is taking Lantus 70 units daily, Humalog 8-15 units with meals (varies with what he eats). He is taking Jardiance 10 mg daily now (took farxiga in the past), Metformin XR #4 tabs of 500 mg daily. He got sick on Victoza. He is taking Zocor 20 mg daily, Lisinopril 10 mg daily. He has put on weight over the past couple of years. He has been mor active over the past few months. He recently joined [employer].  A1c today is 7.5%. he does not have any complaints today. He does want help with weight loss.    Meter download data is as follows:  Highest: 320   Average: 179   Std. Dev.: 78   Lowest: 83   # tests: 12   # hypoglycemic:0   # above target: 4     Patient Active Problem List   Diagnosis     Diabetes mellitus type 2, uncomplicated (H)     HTN (hypertension)     Hyperlipidemia     H/O urethral stricture     ED (erectile dysfunction)     Dribbling urine     Past Medical History:   Diagnosis Date     Diabetes mellitus (H)     Genital herpes     Past Medical/Surgical History:  Past Medical History:   Diagnosis Date     Diabetes (H)      Hypertension      Obese      Past Surgical History:   Procedure Laterality Date     URETHROPLASTY STAGE ONE WITH BUCCAL GRAFT N/A 5/2/2018    Procedure: URETHROPLASTY STAGE ONE WITH BUCCAL GRAFT;  Posterior Urethroplasty with Single Buccal Mucosa Graft;  Surgeon: Herb Awad MD;  Location:  OR     Allergies:  Allergies   Allergen Reactions     Liraglutide      Stomach upset, vomiting      Penicillins      Current Outpatient Medications:      acetaminophen (TYLENOL) 325 MG tablet, Take 1-2 tablets (325-650 mg) by mouth every 6 hours as needed for mild pain, Disp: 45 tablet, Rfl: 0     blood glucose monitoring  "(MARYURI CONTOUR) test strip, Dispense test strips covered by the patient insurance. Test 3 times per day., Disp: , Rfl:      empagliflozin (JARDIANCE) 10 MG TABS tablet, Take 10 mg by mouth, Disp: , Rfl:      Insulin Glargine (LANTUS SC), Inject 70 Units Subcutaneous At Bedtime, Disp: , Rfl:      insulin lispro (HUMALOG PEN) 100 UNIT/ML injection, 200 Units , Disp: , Rfl:      insulin pen needle (B-D U/F) 31G X 8 MM, FOR ADMINISTERING INSULIN (3 HUMALOG AND 1 LANTUS), Disp: , Rfl:      lisinopril (PRINIVIL/ZESTRIL) 10 MG tablet, Take 10 mg by mouth daily (with dinner) , Disp: , Rfl:      loratadine (CLARITIN) 10 MG tablet, Take 10 mg by mouth, Disp: , Rfl:      metFORMIN (GLUCOPHAGE-XR) 500 MG 24 hr tablet, Take 2,000 mg by mouth daily (with dinner) , Disp: , Rfl:      Multiple Vitamins-Minerals (MULTI COMPLETE PO), , Disp: , Rfl:      simvastatin (ZOCOR) 20 MG tablet, Take 20 mg by mouth, Disp: , Rfl:      tadalafil (CIALIS) 10 MG tablet, Take 10 mg by mouth, Disp: , Rfl:     Family History:    Cancer Mother   Gynecologic     Thyroid disease Mother (hypothyroidism)    No Medical Problems Father     Cancer Maternal Grandmother     Diabetes Paternal Grandmother     Mental illness Paternal Grandmother     Colon cancer Cousin   Approximately late 30s     Social History:  Social History     Tobacco Use     Smoking status: Never Smoker     Smokeless tobacco: Never Used   Substance Use Topics     Alcohol use: Yes     Comment: 2-3/month     Additional Social History: 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     ROS:  negative except as mentioned in HPI or in list below    Exam  BP (!) 137/90   Pulse 78   Ht 1.778 m (5' 10\")   Wt 126.4 kg (278 lb 11.2 oz)   BMI 39.99 kg/m     Gen: well appearing, nad, pleasant and conversant  HEENT: anicteric, EOMI, no proptosis or lid lag, no goiter  Ext: no swelling or edema  Feet: no deformities or ulcers, 2+ DP pulses, normal monofilament " sensation. Left great toenail is slightly dystrophic. + superficial peeling on both feet indicative of tinea pedis  Neuro: A&Ox3, no tremor    Labs/Imaging  From HE in [place]:  3/15/19:  LDL 53, HDL 52  1/10/19:  Creatinine 0.85  A1c 7.7% (in 7-8.5% range since 2016)    No results found for: A1C  ENDO DIABETES Latest Ref Rng & Units 8/24/2018   HGB 13.3 - 17.7 g/dL 15.6   GLUCOSE 70 - 99 mg/dL 144 (H)   CREATININE 0.66 - 1.25 mg/dL 0.68   POTASSIUM 3.4 - 5.3 mmol/L 4.1   WBC 4.0 - 11.0 10e9/L 7.7   RBC 4.4 - 5.9 10e12/L 5.59   HGB 13.3 - 17.7 g/dL 15.6   HCT 40.0 - 53.0 % 47.3   MCV 78 - 100 fl 85   MCH 26.5 - 33.0 pg 27.9   MCHC 31.5 - 36.5 g/dL 33.0   RDW 10.0 - 15.0 % 14.6    - 450 10e9/L 227     Assessment and Plan  Type 2 DM, reasonable control. Refills given as per patient request. See Patient Instructions for DM mgmt.    Morbid obesity, could benefit from a reduced 1700 calorie meal plan (REE calc is 2182 using MSJ eqn) and structured exercise program. He did not tolerate GLP-1 agonist. Body mass index is 39.99 kg/m . refer to [facility].    Labs: urine microalbumin, CMP, hemoglobin, A1c (POC)    Patient Instructions:    Please go to the lab on 1st floor after clinic today.    Please see Podiatry about your feet fungus.    Please see the eye doctor once a year for diabetes eye exam.    Please follow up with your primary MD about your blood pressure. Goal is <130/80. Yours was 137/90 today.    Please check your blood sugars twice daily in am when fasting and 2 hours after a meal.    Please continue your most of your diabetes medications as you are, but watch for low blood sugars when you begin a low calorie food plan.    Please take Humalog 2 units per 1 unit of carbohydrates (10 grams).    Please use Humalog correction scale of 1 units per 50 above 140. You can take 1/2 of this correction at bedtime.    You could improve and possibly get rid of your diabetes with significant  weight loss    Please go to [email or web address] to register as a patient to guarantee the lowest price for the drug. Click on How To Save link & choose sign up through local pharmacy & print that registration & take it to your pharmacy.    1,700 calorie meal plan to lose 1 lbs weekly without exercise (REE calc is 2182 using MSJ eqn)  Use meal replacements such as Sanjana's meals, Lean Cuisines, Healthy Choice, Smart Ones, Weight Watchers Meals, and Slim Fast and Glucerna shakes and supplement with fresh fruits and vegetables  Please drink a lot of water daily. Most people typically need about 2 liters of water daily and more if they are exercising, have a large weight, or have a fever or illness. Add Crystal Light for flavoring if desired. But no pop with calories in it.  Please keep a food journal of what you eat, calories in what you eat.  Consider using applications for smart phones such as The Library, NuVista Energy, MeetingSense SoftwareReciSpectafy, LoseIt, Tap&Track, and RelaxM.  Focus on wet volumetrics, meaning, eat more foods that are high in water and fiber such as fruits and vegetables in order to get that full feeling. These are also good for your overall health as well.  Check out Dr. Rimma Feliciano from [facility] - she has cookbooks with low calorie volumetric recipes  You can try Let's Dish to help you prepare meals for you and your family. Often times, the caloric and nutrition data and serving sizes are available for this food. This can be a time saving maneuver. The website can give you more information http://www.Botanical Tans.Dynamics Direct/  Coblennie's Delivers has Let's Dish & fresh low calorie salads  Check out Hello Fresh at https://www.Al Detal/food-boxes/classic-box/  Try Cooking Light recipes for low calorie meal preparation and planning  Other food plan options you can search for on the internet and check out include: Leatha QUACH, [facility]    Please consider health psychologist to discuss how mood, stress, depression and/or  anxiety impact your eating.    Progressively increase physical activity to 60 minutes, including combination of cardio & resistance training x 5 days weekly.    Consider hiring a  to develop a structured progressive workout plan that includes both cardio and resistance training. Obesity is a disease according to the IRS, so you may be able to use some pre-tax dollars from your medical flexible spending account if you get a letter from your doctor and/or fill out a plan-specific form.    RTC: 6 months, Refer pt to [facility]    Jennifer Interiano MD, MPH  Attending Physician  Diabetes/Endocrinology/Metabolism    Time: 60 min spent on evaluation, management, counseling, education, & motivational interviewing with greater than 50% of the total time was spent on counseling and coordinating care    Answers for HPI/ROS submitted by the patient on 9/20/2019   General Symptoms: No  Skin Symptoms: No  HENT Symptoms: No  EYE SYMPTOMS: No  HEART SYMPTOMS: No  LUNG SYMPTOMS: No  INTESTINAL SYMPTOMS: No  URINARY SYMPTOMS: No  REPRODUCTIVE SYMPTOMS: No  SKELETAL SYMPTOMS: No  BLOOD SYMPTOMS: No  NERVOUS SYSTEM SYMPTOMS: Yes  MENTAL HEALTH SYMPTOMS: No  Trouble with coordination: No  Dizziness or trouble with balance: No  Fainting or black-out spells: No  Memory loss: No  Headache: Yes  Seizures: No  Speech problems: No  Tingling: Yes  Tremor: No  Weakness: No  Difficulty walking: No  Paralysis: No  Numbness: No      Jennifer Interiano MD

## 2019-10-02 ENCOUNTER — TELEPHONE (OUTPATIENT)
Dept: ENDOCRINOLOGY | Facility: CLINIC | Age: 39
End: 2019-10-02

## 2019-10-02 NOTE — TELEPHONE ENCOUNTER
Prior Authorization Retail Medication Request    Medication/Dose: Contrave ER 8-90 mg tablets 1 tab daily by mouth x wk#1, then increase to 1 tab am + 1 tab pm x wk #2. Then, 2 tabs am + 1 tab barber wk #3, then 2 tabs am + 2 tabs pm  ICD code  Morbid obesity  E66.01 with Type 2 diabetes  Previously Tried and Failed:  Victoza  GLP1 made him sick   Rationale:BMI 39.99 losing weight will  Improve diabetes control. Trying to get the A1C down from 7.5 % He is  5'10 currently  278 lb 2 OZ   He is active in  an exercise program  And  1700 calorie diet  Mentally prepared to lose weight  Contrave will help him  Stay in control and  Increase weight  Loss.  Also on Jardiance and Metformin XR for Diabetes.   Seen by a weight management / Diabetes MD for close monitoring   Insurance Name: Phone  [phone]  Insurance ID:  [id number]    Pharmacy Information (if different than what is on RX)  Name[facility]  [facility]   Phone:  [phone]   Miranda Espinoza RN on 10/2/2019 at 4:34 PM

## 2019-10-03 ENCOUNTER — MYC MEDICAL ADVICE (OUTPATIENT)
Dept: ENDOCRINOLOGY | Facility: CLINIC | Age: 39
End: 2019-10-03

## 2019-10-03 DIAGNOSIS — E11.9 TYPE 2 DIABETES MELLITUS (H): Primary | ICD-10-CM

## 2020-03-11 ENCOUNTER — HEALTH MAINTENANCE LETTER (OUTPATIENT)
Age: 40
End: 2020-03-11

## 2020-04-09 ENCOUNTER — DOCUMENTATION ONLY (OUTPATIENT)
Dept: CARE COORDINATION | Facility: CLINIC | Age: 40
End: 2020-04-09

## 2020-04-24 ENCOUNTER — MYC MEDICAL ADVICE (OUTPATIENT)
Dept: ENDOCRINOLOGY | Facility: CLINIC | Age: 40
End: 2020-04-24

## 2020-04-24 DIAGNOSIS — E11.9 TYPE 2 DIABETES MELLITUS WITHOUT COMPLICATION, UNSPECIFIED WHETHER LONG TERM INSULIN USE (H): Primary | ICD-10-CM

## 2020-04-24 RX ORDER — INSULIN LISPRO 200 [IU]/ML
INJECTION, SOLUTION SUBCUTANEOUS
Qty: 45 ML | Refills: 3 | Status: SHIPPED | OUTPATIENT
Start: 2020-04-24 | End: 2020-05-04

## 2020-04-24 NOTE — TELEPHONE ENCOUNTER
Jennifer Interiano MD Miller, Deanne M RN    Caller: Unspecified (Today,  9:07 AM)    Phone Number: [phone]               I did not receive Rx orders yet. This can probably wait until I see him next week.

## 2020-04-24 NOTE — TELEPHONE ENCOUNTER
"Per PT request:   \"Hi Dr Interiano, question on my upcoming visit; I did receive a call about a phone or video chat my question would be how valuable that may be without any recent labs and if then that would be necessary? I certainly can make the appointment just wondering?     Also I need a new prescription from you for my humalog the last one was from my previous primary at [facility], it's in the attachment.   Thanks     Clarence\"    New Rx order requires MD signature. Sent to provider for review and signature.   Aruna Ross RN on 4/24/2020 at 9:22 AM     "

## 2020-04-28 DIAGNOSIS — E11.9 TYPE 2 DIABETES MELLITUS WITHOUT COMPLICATION, UNSPECIFIED WHETHER LONG TERM INSULIN USE (H): ICD-10-CM

## 2020-04-28 LAB
HBA1C MFR BLD: 8.4 % (ref 0–5.6)
HGB BLD-MCNC: 14.6 G/DL (ref 13.3–17.7)

## 2020-04-28 PROCEDURE — 82043 UR ALBUMIN QUANTITATIVE: CPT | Performed by: INTERNAL MEDICINE

## 2020-04-28 PROCEDURE — 36415 COLL VENOUS BLD VENIPUNCTURE: CPT | Performed by: INTERNAL MEDICINE

## 2020-04-28 PROCEDURE — 85018 HEMOGLOBIN: CPT | Performed by: INTERNAL MEDICINE

## 2020-04-28 PROCEDURE — 83036 HEMOGLOBIN GLYCOSYLATED A1C: CPT | Performed by: INTERNAL MEDICINE

## 2020-04-28 PROCEDURE — 80053 COMPREHEN METABOLIC PANEL: CPT | Performed by: INTERNAL MEDICINE

## 2020-04-28 NOTE — PROGRESS NOTES
"Patient sent glucose data thru penny    Clarence Boyle is a 40 year old male who is being evaluated via a billable telephone visit.      The patient has been notified of following:     \"This telephone visit will be conducted via a call between you and your physician/provider. We have found that certain health care needs can be provided without the need for a physical exam.  This service lets us provide the care you need with a short phone conversation.  If a prescription is necessary we can send it directly to your pharmacy.  If lab work is needed we can place an order for that and you can then stop by our lab to have the test done at a later time.    Telephone visits are billed at different rates depending on your insurance coverage. During this emergency period, for some insurers they may be billed the same as an in-person visit.  Please reach out to your insurance provider with any questions.    If during the course of the call the physician/provider feels a telephone visit is not appropriate, you will not be charged for this service.\"    Patient has given verbal consent for Telephone visit?  Yes    How would you like to obtain your AVS? Mail a copy    Reason for visit/consult: Type 2 DM     Primary care provider: Marcos Brar     HPI:  Lab Results   Component Value Date    A1C 8.4 04/28/2020   Clarence is a 39 yo male with T2DM (>10 years) with morbid obesity who I last saw 9/23/19. A1c was 7.5%. Prior to that, he was last seen by Dr. Christensen 2/12/18. He is takingLantus 70 units at , Humalog I:C 2:1 & 1 per 50>140. He is taking Jardiance 210 mg daily now (took farxiga in the past), Metformin XR #4 tabs of 500 mg daily. He got sick on Victoza. He is taking Zocor 20 mg daily, Lisinopril 10 mg daily. He has put on weight over the past couple of years. He has been mor active over the past few months. He recently joined [employer]. Contrave was not covered by insurance and he did not pursue it.  He does not have " any complaints today, except for frustration about hyperglycemia. He does want help with weight loss.    glucose data:  Fasting sugars: 130 or less  Before dinner: 250 after lunch yesterday, took 4 units and was still hyperglycemic at hs    Patient Active Problem List     Diabetes mellitus type 2, uncomplicated (H)     HTN (hypertension)     Hyperlipidemia     H/O urethral stricture     ED (erectile dysfunction)     Dribbling urine     Past Medical History:     Diabetes mellitus (H)     Genital herpes     Past Medical/Surgical History:  Past Medical History:   Diagnosis Date     Diabetes (H)      Hypertension      Obese      Past Surgical History:   Procedure Laterality Date     URETHROPLASTY STAGE ONE WITH BUCCAL GRAFT N/A 5/2/2018    Procedure: URETHROPLASTY STAGE ONE WITH BUCCAL GRAFT;  Posterior Urethroplasty with Single Buccal Mucosa Graft;  Surgeon: Herb Awad MD;  Location:  OR       Allergies:  Allergies   Allergen Reactions     Liraglutide      Stomach upset, vomiting      Penicillins        Current Medications   Current Outpatient Medications   Medication     acetaminophen (TYLENOL) 325 MG tablet     blood glucose (NO BRAND SPECIFIED) test strip     blood glucose monitoring (MARYURI CONTOUR) test strip     blood glucose monitoring (NO BRAND SPECIFIED) meter device kit     CONTRAVE 8-90 MG per 12 hr tablet     empagliflozin (JARDIANCE) 10 MG TABS tablet     Insulin Glargine (LANTUS SC)     insulin glargine (LANTUS SOLOSTAR) 100 UNIT/ML pen     Insulin Lispro (HUMALOG KWIKPEN) 200 UNIT/ML soln     insulin lispro (HUMALOG PEN) 100 UNIT/ML injection     insulin pen needle (B-D U/F) 31G X 8 MM     lisinopril (PRINIVIL/ZESTRIL) 10 MG tablet     loratadine (CLARITIN) 10 MG tablet     metFORMIN (GLUCOPHAGE-XR) 500 MG 24 hr tablet     Multiple Vitamins-Minerals (MULTI COMPLETE PO)     simvastatin (ZOCOR) 20 MG tablet     tadalafil (CIALIS) 10 MG tablet     No current facility-administered medications  for this visit.        Family History:  No family history on file.    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     Social History     Tobacco Use     Smoking status: Never Smoker     Smokeless tobacco: Never Used   Substance Use Topics     Alcohol use: Yes     Comment: 2-3/month       ROS:  negative except as mentioned in HPI    Exam  There were no vitals taken for this virtual visit.  Gen: calm, nad, pleasant and conversant  Neuro: A&O    Labs/Imaging    ENDO DIABETES Latest Ref Rng & Units 4/28/2020   HEMOGLOBIN A1C 0 - 5.6 % 8.4 (H)   HEMOGLOBIN A1C, POC 4.3 - 6.0 %    HGB 13.3 - 17.7 g/dL 14.6   GLUCOSE 70 - 99 mg/dL 201 (H)   ALBUMIN URINE MG/L mg/L <5   ALBUMIN URINE MG/G CR 0 - 17 mg/g Cr Unable to calculate due to low value   CREATININE 0.66 - 1.25 mg/dL 0.65 (L)   POTASSIUM 3.4 - 5.3 mmol/L 4.1   ALT 0 - 70 U/L 14   AST 0 - 45 U/L 10   WBC 4.0 - 11.0 10e9/L    RBC 4.4 - 5.9 10e12/L    HGB 13.3 - 17.7 g/dL 14.6   HCT 40.0 - 53.0 %    MCV 78 - 100 fl    MCH 26.5 - 33.0 pg    MCHC 31.5 - 36.5 g/dL    RDW 10.0 - 15.0 %     - 450 10e9/L        Lab Results   Component Value Date    A1C 8.4 04/28/2020     Creatinine   Date Value Ref Range Status   04/28/2020 0.65 (L) 0.66 - 1.25 mg/dL Final     From [facility]:  3/15/19:  LDL 53, HDL 52  1/10/19:  Creatinine 0.85  A1c 7.7% (in 7-8.5% range since 2016)    Assessment and Plan    Type 2 DM, suboptimal control, in context of Morbid Obesity. He has not been able to go to the gym during COVID19 and is trying to watch his carb intake. He is under insulinized. See Patient Instructions for DM mgmt. He was not able to get Contrave covered. Will prescribe generics.    Labs: done     Patient Instructions:    Food goal: 1,500-1,700 calorie food plan using meal replacements supplementing with fresh fruits and vegetables and food journal    Activity goal: Start walking program working up to 4 miles daily and home  exercises using videos and home equipment.    Weight goal: weigh self 2x weekly and lose 1-2 lbs weekly    Medication goal: to start wellbutrin and naltexone, to increase Humalog I:C ratio from 2:1 to 3:1 and 1 per 25>140.      RTC: 3 months    Jennifer Interiano MD, MPH  Attending Physician  Diabetes/Endocrinology/Metabolism    Time: 30 min spent on chart review, evaluation, management, counseling, education, & motivational interviewing with greater than 50% of the total time was spent on counseling and coordinating care    Phone call duration: 13 minutes

## 2020-04-29 ENCOUNTER — RECORDS - HEALTHEAST (OUTPATIENT)
Dept: ADMINISTRATIVE | Facility: OTHER | Age: 40
End: 2020-04-29

## 2020-04-29 ENCOUNTER — VIRTUAL VISIT (OUTPATIENT)
Dept: ENDOCRINOLOGY | Facility: CLINIC | Age: 40
End: 2020-04-29

## 2020-04-29 DIAGNOSIS — E66.01 MORBID OBESITY (H): ICD-10-CM

## 2020-04-29 DIAGNOSIS — E11.9 TYPE 2 DIABETES MELLITUS WITHOUT COMPLICATION, UNSPECIFIED WHETHER LONG TERM INSULIN USE (H): ICD-10-CM

## 2020-04-29 LAB
ALBUMIN SERPL-MCNC: 3.7 G/DL (ref 3.4–5)
ALP SERPL-CCNC: 79 U/L (ref 40–150)
ALT SERPL W P-5'-P-CCNC: 14 U/L (ref 0–70)
ANION GAP SERPL CALCULATED.3IONS-SCNC: 7 MMOL/L (ref 3–14)
AST SERPL W P-5'-P-CCNC: 10 U/L (ref 0–45)
BILIRUB SERPL-MCNC: 0.5 MG/DL (ref 0.2–1.3)
BUN SERPL-MCNC: 16 MG/DL (ref 7–30)
CALCIUM SERPL-MCNC: 8.7 MG/DL (ref 8.5–10.1)
CHLORIDE SERPL-SCNC: 106 MMOL/L (ref 94–109)
CO2 SERPL-SCNC: 25 MMOL/L (ref 20–32)
CREAT SERPL-MCNC: 0.65 MG/DL (ref 0.66–1.25)
CREAT UR-MCNC: 81 MG/DL
GFR SERPL CREATININE-BSD FRML MDRD: >90 ML/MIN/{1.73_M2}
GLUCOSE SERPL-MCNC: 201 MG/DL (ref 70–99)
MICROALBUMIN UR-MCNC: <5 MG/L
MICROALBUMIN/CREAT UR: NORMAL MG/G CR (ref 0–17)
POTASSIUM SERPL-SCNC: 4.1 MMOL/L (ref 3.4–5.3)
PROT SERPL-MCNC: 7.7 G/DL (ref 6.8–8.8)
SODIUM SERPL-SCNC: 138 MMOL/L (ref 133–144)

## 2020-04-29 RX ORDER — NALTREXONE HYDROCHLORIDE 50 MG/1
TABLET, FILM COATED ORAL
Qty: 30 TABLET | Refills: 3 | Status: SHIPPED | OUTPATIENT
Start: 2020-04-29 | End: 2022-03-02

## 2020-04-29 RX ORDER — BUPROPION HYDROCHLORIDE 150 MG/1
150 TABLET, EXTENDED RELEASE ORAL 2 TIMES DAILY
Qty: 60 TABLET | Refills: 3 | Status: SHIPPED | OUTPATIENT
Start: 2020-04-29 | End: 2021-01-27

## 2020-04-29 NOTE — PATIENT INSTRUCTIONS
Nice to talk with you today in virtual clinic.    Food goal: 1,500-1,700 calorie food plan using meal replacements supplementing with fresh fruits and vegetables and food journal    Activity goal: Start walking program working up to 4 miles daily and home exercises using videos and home equipment.    Weight goal: weigh self 2x weekly and lose 1-2 lbs weekly    Medication goal: to start wellbutrin and naltexone, to increase Humalog I:C ratio from 2:1 to 3:1 and 1 per 25>140.    RTC 3 months    Best Wishes,  Dr. Jennifer Interiano MD, MPH  Endocrinologist

## 2020-04-29 NOTE — LETTER
"4/29/2020       RE: Clarence Boyle  [id number]  [facility] [address]     Dear Colleague,    Thank you for referring your patient, Clarence Boyle, to the [facility] ENDOCRINOLOGY at [facility]. Please see a copy of my visit note below.    Patient sent glucose data thru penny    Clarence Boyle is a 40 year old male who is being evaluated via a billable telephone visit.      The patient has been notified of following:     \"This telephone visit will be conducted via a call between you and your physician/provider. We have found that certain health care needs can be provided without the need for a physical exam.  This service lets us provide the care you need with a short phone conversation.  If a prescription is necessary we can send it directly to your pharmacy.  If lab work is needed we can place an order for that and you can then stop by our lab to have the test done at a later time.    Telephone visits are billed at different rates depending on your insurance coverage. During this emergency period, for some insurers they may be billed the same as an in-person visit.  Please reach out to your insurance provider with any questions.    If during the course of the call the physician/provider feels a telephone visit is not appropriate, you will not be charged for this service.\"    Patient has given verbal consent for Telephone visit?  Yes    How would you like to obtain your AVS? Mail a copy    Reason for visit/consult: Type 2 DM     Primary care provider: Marcos Brar     HPI:  Lab Results   Component Value Date    A1C 8.4 04/28/2020   Clarence is a 39 yo male with T2DM (>10 years) with morbid obesity who I last saw 9/23/19. A1c was 7.5%. Prior to that, he was last seen by Dr. Christensen 2/12/18. He is takingLantus 70 units at , Humalog I:C 2:1 & 1 per 50>140. He is taking Jardiance 210 mg daily now (took farxiga in the past), Metformin XR #4 tabs of 500 mg daily. He got sick on Victoza. He is " taking Zocor 20 mg daily, Lisinopril 10 mg daily. He has put on weight over the past couple of years. He has been mor active over the past few months. He recently joined [employer]. Contrave was not covered by insurance and he did not pursue it.  He does not have any complaints today, except for frustration about hyperglycemia. He does want help with weight loss.    glucose data:  Fasting sugars: 130 or less  Before dinner: 250 after lunch yesterday, took 4 units and was still hyperglycemic at hs    Patient Active Problem List     Diabetes mellitus type 2, uncomplicated (H)     HTN (hypertension)     Hyperlipidemia     H/O urethral stricture     ED (erectile dysfunction)     Dribbling urine     Past Medical History:     Diabetes mellitus (H)     Genital herpes     Past Medical/Surgical History:  Past Medical History:   Diagnosis Date     Diabetes (H)      Hypertension      Obese      Past Surgical History:   Procedure Laterality Date     URETHROPLASTY STAGE ONE WITH BUCCAL GRAFT N/A 5/2/2018    Procedure: URETHROPLASTY STAGE ONE WITH BUCCAL GRAFT;  Posterior Urethroplasty with Single Buccal Mucosa Graft;  Surgeon: Herb Awad MD;  Location:  OR       Allergies:  Allergies   Allergen Reactions     Liraglutide      Stomach upset, vomiting      Penicillins        Current Medications   Current Outpatient Medications   Medication     acetaminophen (TYLENOL) 325 MG tablet     blood glucose (NO BRAND SPECIFIED) test strip     blood glucose monitoring (MARYURI CONTOUR) test strip     blood glucose monitoring (NO BRAND SPECIFIED) meter device kit     CONTRAVE 8-90 MG per 12 hr tablet     empagliflozin (JARDIANCE) 10 MG TABS tablet     Insulin Glargine (LANTUS SC)     insulin glargine (LANTUS SOLOSTAR) 100 UNIT/ML pen     Insulin Lispro (HUMALOG KWIKPEN) 200 UNIT/ML soln     insulin lispro (HUMALOG PEN) 100 UNIT/ML injection     insulin pen needle (B-D U/F) 31G X 8 MM     lisinopril (PRINIVIL/ZESTRIL) 10 MG  tablet     loratadine (CLARITIN) 10 MG tablet     metFORMIN (GLUCOPHAGE-XR) 500 MG 24 hr tablet     Multiple Vitamins-Minerals (MULTI COMPLETE PO)     simvastatin (ZOCOR) 20 MG tablet     tadalafil (CIALIS) 10 MG tablet     No current facility-administered medications for this visit.        Family History:  No family history on file.    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     Social History     Tobacco Use     Smoking status: Never Smoker     Smokeless tobacco: Never Used   Substance Use Topics     Alcohol use: Yes     Comment: 2-3/month       ROS:  negative except as mentioned in HPI    Exam  There were no vitals taken for this virtual visit.  Gen: calm, nad, pleasant and conversant  Neuro: A&O    Labs/Imaging    ENDO DIABETES Latest Ref Rng & Units 4/28/2020   HEMOGLOBIN A1C 0 - 5.6 % 8.4 (H)   HEMOGLOBIN A1C, POC 4.3 - 6.0 %    HGB 13.3 - 17.7 g/dL 14.6   GLUCOSE 70 - 99 mg/dL 201 (H)   ALBUMIN URINE MG/L mg/L <5   ALBUMIN URINE MG/G CR 0 - 17 mg/g Cr Unable to calculate due to low value   CREATININE 0.66 - 1.25 mg/dL 0.65 (L)   POTASSIUM 3.4 - 5.3 mmol/L 4.1   ALT 0 - 70 U/L 14   AST 0 - 45 U/L 10   WBC 4.0 - 11.0 10e9/L    RBC 4.4 - 5.9 10e12/L    HGB 13.3 - 17.7 g/dL 14.6   HCT 40.0 - 53.0 %    MCV 78 - 100 fl    MCH 26.5 - 33.0 pg    MCHC 31.5 - 36.5 g/dL    RDW 10.0 - 15.0 %     - 450 10e9/L        Lab Results   Component Value Date    A1C 8.4 04/28/2020     Creatinine   Date Value Ref Range Status   04/28/2020 0.65 (L) 0.66 - 1.25 mg/dL Final     From [facility] in [facility]:  3/15/19:  LDL 53, HDL 52  1/10/19:  Creatinine 0.85  A1c 7.7% (in 7-8.5% range since 2016)    Assessment and Plan    Type 2 DM, suboptimal control, in context of Morbid Obesity. He has not been able to go to the gym during COVID19 and is trying to watch his carb intake. He is under insulinized. See Patient Instructions for DM mgmt. He was not able to get Contrave covered. Will  prescribe generics.    Labs: done     Patient Instructions:    Food goal: 1,500-1,700 calorie food plan using meal replacements supplementing with fresh fruits and vegetables and food journal    Activity goal: Start walking program working up to 4 miles daily and home exercises using videos and home equipment.    Weight goal: weigh self 2x weekly and lose 1-2 lbs weekly    Medication goal: to start wellbutrin and naltexone, to increase Humalog I:C ratio from 2:1 to 3:1 and 1 per 25>140.      RTC: 3 months    Jennifer Interiano MD, MPH  Attending Physician  Diabetes/Endocrinology/Metabolism    Time: 30 min spent on chart review, evaluation, management, counseling, education, & motivational interviewing with greater than 50% of the total time was spent on counseling and coordinating care    Phone call duration: 13 minutes      Again, thank you for allowing me to participate in the care of your patient.      Sincerely,    Jennifer Interiano MD

## 2020-05-04 ENCOUNTER — TELEPHONE (OUTPATIENT)
Dept: ENDOCRINOLOGY | Facility: CLINIC | Age: 40
End: 2020-05-04

## 2020-05-04 DIAGNOSIS — E11.9 TYPE 2 DIABETES MELLITUS WITHOUT COMPLICATION, UNSPECIFIED WHETHER LONG TERM INSULIN USE (H): ICD-10-CM

## 2020-05-04 DIAGNOSIS — E66.01 MORBID OBESITY (H): ICD-10-CM

## 2020-05-04 RX ORDER — INSULIN LISPRO 200 [IU]/ML
INJECTION, SOLUTION SUBCUTANEOUS
Qty: 30 ML | Refills: 11 | Status: SHIPPED | OUTPATIENT
Start: 2020-05-04 | End: 2020-05-07

## 2020-05-04 NOTE — TELEPHONE ENCOUNTER
[facility] Call Center    Phone Message    May a detailed message be left on voicemail: yes     Reason for Call: Medication Question or concern regarding medication   Prescription Clarification  Name of Medication: Insulin Lispro (HUMALOG KWIKPEN) 200 UNIT/ML soln   Prescribing Provider: Dr Interiano   Pharmacy: [id number] [address] AT [facility] [address]    What on the order needs clarification? Needs to know the Maximum Daily Dosage in order to prescribe - wants call ASAP - said they sent multiple faxes for over a week on this - Thanks          Action Taken: Message routed to:  [facility] Endocrine    Travel Screening: Not Applicable

## 2020-05-07 RX ORDER — INSULIN LISPRO 200 [IU]/ML
INJECTION, SOLUTION SUBCUTANEOUS
Qty: 90 ML | Refills: 3 | Status: SHIPPED | OUTPATIENT
Start: 2020-05-07 | End: 2021-05-27

## 2020-05-07 NOTE — TELEPHONE ENCOUNTER
[facility] Call Center    Phone Message    May a detailed message be left on voicemail: yes     Reason for Call: Other: Pharmacy calling again to f/u on request from 5/4. The techinician stated they are needing to know the maximum  daily units allowed for Insulin Lispro (HUMALOG KWIKPEN) 200 UNIT/ML soln. Please advise.    Action Taken: Message routed to:  [facility] Endocrinology    Travel Screening: Not Applicable

## 2020-09-22 DIAGNOSIS — E66.01 MORBID OBESITY (H): ICD-10-CM

## 2020-09-22 DIAGNOSIS — E11.9 TYPE 2 DIABETES MELLITUS WITHOUT COMPLICATION, UNSPECIFIED WHETHER LONG TERM INSULIN USE (H): ICD-10-CM

## 2020-09-22 NOTE — TELEPHONE ENCOUNTER
LANTUS SOLOSTAR PEN INJ 3ML   Insulin - refilled per clinic  Last Virtual Visit: 4/29/20 recommended 3 month follow up  No upcoming visits scheduled

## 2020-10-07 DIAGNOSIS — E66.01 MORBID OBESITY (H): ICD-10-CM

## 2020-10-07 DIAGNOSIS — E11.9 TYPE 2 DIABETES MELLITUS WITHOUT COMPLICATION, UNSPECIFIED WHETHER LONG TERM INSULIN USE (H): ICD-10-CM

## 2020-10-12 RX ORDER — SIMVASTATIN 20 MG
20 TABLET ORAL DAILY
Qty: 90 TABLET | Refills: 0 | Status: SHIPPED | OUTPATIENT
Start: 2020-10-12 | End: 2021-01-26

## 2020-10-12 RX ORDER — LISINOPRIL 10 MG/1
10 TABLET ORAL
Qty: 90 TABLET | Refills: 1 | Status: SHIPPED | OUTPATIENT
Start: 2020-10-12 | End: 2021-05-24

## 2020-10-12 RX ORDER — METFORMIN HCL 500 MG
2000 TABLET, EXTENDED RELEASE 24 HR ORAL
Qty: 360 TABLET | Refills: 0 | Status: SHIPPED | OUTPATIENT
Start: 2020-10-12 | End: 2021-01-26

## 2020-10-12 NOTE — TELEPHONE ENCOUNTER
simvastatin (ZOCOR) 20 MG tablet      Last Written Prescription Date:  9/23/2019  Last Fill Quantity: 90,   # refills: 3  Routing refill request to provider for review/approval because:  Lab past due- LDL  - Lipid cascade 3/15/2019 outside lab.    metFORMIN (GLUCOPHAGE-XR) 500 MG 24 hr tablet      Last Written Prescription Date:  9/23/2019  Last Fill Quantity: 360,   # refills: 3  Routing refill request to provider for review/approval because:  Lab past due- A1C  - If HgbA1C is 8 or greater, it needs to be on file within the past 3 months.     Hemoglobin A1C   Date Value Ref Range Status   04/28/2020 8.4 (H) 0 - 5.6 % Final     Comment:     Results confirmed by repeat test  Normal <5.7% Prediabetes 5.7-6.4%  Diabetes 6.5% or higher - adopted from ADA   consensus guidelines.       Last Office Visit : 4/29/2020  Future Office visit:  none

## 2020-10-26 ENCOUNTER — ALLIED HEALTH/NURSE VISIT (OUTPATIENT)
Dept: FAMILY MEDICINE | Facility: CLINIC | Age: 40
End: 2020-10-26
Payer: COMMERCIAL

## 2020-10-26 DIAGNOSIS — Z23 NEED FOR PROPHYLACTIC VACCINATION AND INOCULATION AGAINST INFLUENZA: Primary | ICD-10-CM

## 2020-10-26 PROCEDURE — 90686 IIV4 VACC NO PRSV 0.5 ML IM: CPT

## 2020-10-26 PROCEDURE — 90471 IMMUNIZATION ADMIN: CPT

## 2020-10-26 PROCEDURE — 99207 PR NO CHARGE NURSE ONLY: CPT

## 2020-11-16 ENCOUNTER — TELEPHONE (OUTPATIENT)
Dept: ENDOCRINOLOGY | Facility: CLINIC | Age: 40
End: 2020-11-16

## 2020-11-16 NOTE — TELEPHONE ENCOUNTER
[facility] Call Center    Phone Message    May a detailed message be left on voicemail: yes     Reason for Call: Other: Pt called wondering if he needed to do labs before his virtual appt on 11/30/20. Please follow up with pt     Action Taken: Message routed to:  [facility] Endo    Travel Screening: Not Applicable

## 2020-11-24 NOTE — PROGRESS NOTES
DIABETES CONSULT NOTE FOLLOW UP    HPI:  Reason for visit/consult: Type 2 DM     Primary care provider: Marcos Brar    HE'S NOT DOING WHAT HE'S SUPPOSED TO DO. HE STOPPED JARDIANCE 3 MONTHS AGO DUE TO HE WANTED TO STOP IT DUE TO TAKING SO MUCH STUFF. HASN'T BEEN TESTING AND HASN'T BEEN PAYING ATTENTION. HE TOOK NTX X 2 WEEKS AND HE FELT NAUSEATED. TAKING METFORMIN #4 TABS DAILY. ADMITS HAS HAD TROUBLE TESTING. HE DID NOT HAVE INSULIN AT WORK. BARRIERS TO SELF-CARE ARE - LIFE IS BUSY, LITTLE KIDS AT HOME, CHAOS. HADN'T TESTED IN A WEEK. HE IS GOING TO BRING HIS METER TO WORK AND WILL CONTINUE TO CHO COUNT.             Lab Results   Component Value Date     A1C 8.4 04/28/2020   Clarence is a 41 yo male with T2DM (>10 years) with morbid obesity who I last saw  4/29/20 and prior to that, I saw him in later part of 2019, A1c was 7.5%. Prior to that, he was last seen by Dr. Christensen 2/12/18. He is still takingLantus 70 units at hs, Humalog I:C 2:1 & 1 per 50>140, when he remembers. He admits he checks sometimes only weekly due to barriers at home/work/pandemic. He was taking Jardiance 210 mg daily in the past and had stopped it and is now willing to restart it again (took farxiga in the past), Metformin XR #4 tabs of 500 mg daily. He got sick on Victoza. He is taking Zocor 20 mg daily, Lisinopril 10 mg daily. He has put on weight over the past couple of years. He has been mor active over the past few months. He recently joined [employer]. Contrave was not covered by insurance and he did not pursue it.  He does not have any complaints today, except for frustration about hyperglycemia. And wants help with weight loss.  He reports:  HE'S NOT DOING WHAT HE'S SUPPOSED TO DO. HE STOPPED JARDIANCE 3 MONTHS AGO DUE TO HE WANTED TO STOP IT DUE TO TAKING SO MUCH STUFF. HASN'T BEEN TESTING AND HASN'T BEEN PAYING ATTENTION. HE TOOK NTX X 2 WEEKS AND HE FELT NAUSEATED. TAKING METFORMIN #4 TABS DAILY. ADMITS HAS HAD TROUBLE TESTING.  HE DID NOT HAVE INSULIN AT WORK. BARRIERS TO SELF-CARE ARE - LIFE IS BUSY, LITTLE KIDS AT HOME, CHAOS. HADN'T TESTED IN A WEEK. HE IS GOING TO BRING HIS METER TO WORK AND WILL CONTINUE TO CHO COUNT.    Objective glucose data: see report patient via MyChart on [facility] on 11/30/20 and data pasted from that report below:    275lbs (39.4 BMI)  Insulin  Average Blood Glucose  30  DAYS 302  mg/dL  83 Above (>130 mg/dL)  17 In Range  0 Below (<80 mg/dL)  2 checks per day (Goal: 3 per day)  90  DAYS 263  mg/dL  80 Above (>130 mg/dL)  20 In Range  0 Below (<80 mg/dL)  1 checks per day (Goal: 3 per day)  Feeling Tags (at Blood Glucose Check)  30  DAYS  50 Feel fine  8 I don't feel well  17 Light Headed  8 Missed Medications  17 No tag  Blood Glucose Highs and Lows  90  DAYS 3  High  >400 mg/dL  0  Low  <50 mg/dL    Patient Active Problem List     Diabetes mellitus type 2, uncomplicated (H)     HTN (hypertension)     Hyperlipidemia     H/O urethral stricture     ED (erectile dysfunction)     Dribbling urine     Past Medical History:     Diabetes mellitus (H)     Genital herpes      Past Medical/Surgical History:  Past Medical History:   Diagnosis Date     Diabetes (H)      Hypertension      Obese      Past Surgical History:   Procedure Laterality Date     URETHROPLASTY STAGE ONE WITH BUCCAL GRAFT N/A 5/2/2018    Procedure: URETHROPLASTY STAGE ONE WITH BUCCAL GRAFT;  Posterior Urethroplasty with Single Buccal Mucosa Graft;  Surgeon: Herb Awad MD;  Location: [facility]       Allergies:  Allergies   Allergen Reactions     Liraglutide      Stomach upset, vomiting      Penicillins        Current Medications   Current Outpatient Medications   Medication     acetaminophen (TYLENOL) 325 MG tablet     blood glucose (NO BRAND SPECIFIED) test strip     blood glucose monitoring (MARYURI CONTOUR) test strip     blood glucose monitoring (NO BRAND SPECIFIED) meter device kit     buPROPion (WELLBUTRIN SR) 150 MG 12 hr tablet     CONTRAVE  8-90 MG per 12 hr tablet     empagliflozin (JARDIANCE) 10 MG TABS tablet     Insulin Glargine (LANTUS SC)     insulin glargine (LANTUS SOLOSTAR) 100 UNIT/ML pen     Insulin Lispro (HUMALOG KWIKPEN) 200 UNIT/ML soln     insulin lispro (HUMALOG PEN) 100 UNIT/ML injection     insulin pen needle (B-D U/F) 31G X 8 MM     lisinopril (ZESTRIL) 10 MG tablet     loratadine (CLARITIN) 10 MG tablet     metFORMIN (GLUCOPHAGE-XR) 500 MG 24 hr tablet     Multiple Vitamins-Minerals (MULTI COMPLETE PO)     naltrexone (DEPADE/REVIA) 50 MG tablet     simvastatin (ZOCOR) 20 MG tablet     tadalafil (CIALIS) 10 MG tablet     No current facility-administered medications for this visit.        Family History:  No family history on file.    Social History:  Social History     Tobacco Use     Smoking status: Never Smoker     Smokeless tobacco: Never Used   Substance Use Topics     Alcohol use: Yes     Comment: 2-3/month       Exam  There were no vitals taken for this virtual visit due to pandemic  Gen: well appearing, nad, pleasant and conversant  HEENT: anicteric, EOMI, no proptosis or lid lag, no goiter  Neuro: A&O    Labs/Imaging    ENDO DIABETES Latest Ref Rng & Units 4/28/2020   HEMOGLOBIN A1C 0 - 5.6 % 8.4 (H)   HEMOGLOBIN A1C, POC 4.3 - 6.0 %    HGB 13.3 - 17.7 g/dL 14.6   GLUCOSE 70 - 99 mg/dL 201 (H)   ALBUMIN URINE MG/L mg/L <5   ALBUMIN URINE MG/G CR 0 - 17 mg/g Cr Unable to calculate due to low value   CREATININE 0.66 - 1.25 mg/dL 0.65 (L)   POTASSIUM 3.4 - 5.3 mmol/L 4.1   ALT 0 - 70 U/L 14   AST 0 - 45 U/L 10   WBC 4.0 - 11.0 10e9/L    RBC 4.4 - 5.9 10e12/L    HGB 13.3 - 17.7 g/dL 14.6   HCT 40.0 - 53.0 %    MCV 78 - 100 fl    MCH 26.5 - 33.0 pg    MCHC 31.5 - 36.5 g/dL    RDW 10.0 - 15.0 %     - 450 10e9/L        Assessment and Plan    Type 2 DM, suboptimal control, in context of Morbid Obesity. He has not been able to go to the gym during COVID19 and is trying to watch his carb intake. He is still very under  insulinized due to compliance related to pandemic stress, work, family, chaos. See Patient Instructions for DM mgmt. He was not able to get Contrave covered in the past, so is on generics.     Labs: done     Patient Instructions:     We appreciate your assistance in coordinating your healthcare.     Please upload your insulin pump, blood sugar meter and/or continuous glucose monitor at home 1-2 days before your next diabetes-related appointment.   This will allow your provider to review your  data before your scheduled virtual visit.    To ask a question to your Endocrine care team, please send them a [employer] message, or reach them by phone at [phone]     To expedite your medication refill(s), please contact your pharmacy and have them   fax a refill request to: [phone].  *Please allow 3 business days for routine medication refills.  *Please allow 5 business days for controlled substance medication refills.    For after-hours urgent Endocrine issues, that do not require 911, please dial [phone], and ask to speak with the Endocrinologist On-Call    YOUR TOP PRIORITIES:  1) SELF, 2) FAMILY, 3) JOB, 4) EVERYTHING ELSE    Nice to talk with you today in virtual clinic.  Food goal: 1,500-1,700 calorie food plan using meal replacements supplementing with fresh fruits and vegetables and food journal     Activity goal: Start walking program working up to 4 miles daily and home exercises using videos and home equipment.     Weight goal: weigh self 2x weekly and lose 1-2 lbs weekly     Medication goal: to continue wellbutrin, stay off naltexone due to nausea, continue Humalog I:C ratio 3:1 and 1 per 25>140. RESTART JARDIANCE    Diabetes goal: to check blood sugars at least daily, if not 4x daily (when fasting in am and 2 hours after meals and at bedtime), bring insulin to work, and take the insulin     RTC: Diabetes Education in 1-4 weeks, Diabetes Team PA in 1-3 months, me in 6 months    Gian WishDr. fadia  "Jennifer Interiano MD, MPH  Endocrinologist    Time: 40 min spent on chart review, evaluation, management, counseling, education, & motivational interviewing with greater than 50% of the total time was spent on counseling and coordinating care and documentation      Outcome for 11/24/20 10:43 AM :Left Voicemail for patient to call back - soj   Outcome for 11/25/20 8:35 AM :Left Voicemail for patient to call back- soj     Clarence Boyle is a 40 year old male who is being evaluated via a billable video visit.      The patient has been notified of following:     \"This video visit will be conducted via a call between you and your physician/provider. We have found that certain health care needs can be provided without the need for an in-person physical exam.  This service lets us provide the care you need with a video conversation.  If a prescription is necessary we can send it directly to your pharmacy.  If lab work is needed we can place an order for that and you can then stop by our lab to have the test done at a later time.    Video visits are billed at different rates depending on your insurance coverage.  Please reach out to your insurance provider with any questions.    If during the course of the call the physician/provider feels a video visit is not appropriate, you will not be charged for this service.\"    Patient has given verbal consent for Video visit? Yes  How would you like to obtain your AVS? [place]    Video-Visit Details    Type of service:  Video Visit    Video Start: 11/30/2020 12:35 pm   Stop: 11/30/2020 12:50 pm    Originating Location (pt. Location): Other did not ask    Distant Location (provider location):  [facility] [place]     Platform used for Video Visit: Divya"

## 2020-11-25 NOTE — PATIENT INSTRUCTIONS
We appreciate your assistance in coordinating your healthcare.     Please upload your insulin pump, blood sugar meter and/or continuous glucose monitor at home 1-2 days before your next diabetes-related appointment.   This will allow your provider to review your  data before your scheduled virtual visit.    To ask a question to your Endocrine care team, please send them a [employer] message, or reach them by phone at [phone]     To expedite your medication refill(s), please contact your pharmacy and have them   fax a refill request to: [phone].  *Please allow 3 business days for routine medication refills.  *Please allow 5 business days for controlled substance medication refills.    For after-hours urgent Endocrine issues, that do not require 911, please dial [phone], and ask to speak with the Endocrinologist On-Call    YOUR TOP PRIORITIES:  1) SELF, 2) FAMILY, 3) JOB, 4) EVERYTHING ELSE    Nice to talk with you today in virtual clinic.  Food goal: 1,500-1,700 calorie food plan using meal replacements supplementing with fresh fruits and vegetables and food journal     Activity goal: Start walking program working up to 4 miles daily and home exercises using videos and home equipment.     Weight goal: weigh self 2x weekly and lose 1-2 lbs weekly     Medication goal: to continue wellbutrin, stay off naltexone due to nausea, continue Humalog I:C ratio 3:1 and 1 per 25>140. RESTART JARDIANCE    Diabetes goal: to check blood sugars at least daily, if not 4x daily (when fasting in am and 2 hours after meals and at bedtime), bring insulin to work, and take the insulin     RTC: Diabetes Education in 1-4 weeks, Diabetes Team PA in 1-3 months, me in 6 months    Gian Reyna,  Dr. Jennifer Interiano MD, MPH  Endocrinologist

## 2020-11-30 ENCOUNTER — VIRTUAL VISIT (OUTPATIENT)
Dept: ENDOCRINOLOGY | Facility: CLINIC | Age: 40
End: 2020-11-30
Payer: COMMERCIAL

## 2020-11-30 DIAGNOSIS — E66.01 MORBID OBESITY (H): Primary | ICD-10-CM

## 2020-11-30 DIAGNOSIS — E11.65 TYPE 2 DIABETES MELLITUS WITH HYPERGLYCEMIA, UNSPECIFIED WHETHER LONG TERM INSULIN USE (H): ICD-10-CM

## 2020-11-30 PROCEDURE — 99214 OFFICE O/P EST MOD 30 MIN: CPT | Mod: 95 | Performed by: INTERNAL MEDICINE

## 2020-12-01 PROBLEM — E11.9 DIABETES MELLITUS, TYPE 2 (H): Status: ACTIVE | Noted: 2020-12-01

## 2021-01-03 ENCOUNTER — HEALTH MAINTENANCE LETTER (OUTPATIENT)
Age: 41
End: 2021-01-03

## 2021-01-23 DIAGNOSIS — E11.9 TYPE 2 DIABETES MELLITUS WITHOUT COMPLICATION, UNSPECIFIED WHETHER LONG TERM INSULIN USE (H): ICD-10-CM

## 2021-01-23 DIAGNOSIS — E66.01 MORBID OBESITY (H): ICD-10-CM

## 2021-01-25 DIAGNOSIS — E11.9 TYPE 2 DIABETES MELLITUS WITHOUT COMPLICATION, UNSPECIFIED WHETHER LONG TERM INSULIN USE (H): ICD-10-CM

## 2021-01-25 DIAGNOSIS — E66.01 MORBID OBESITY (H): ICD-10-CM

## 2021-01-26 NOTE — TELEPHONE ENCOUNTER
metFORMIN (GLUCOPHAGE-XR) 500 MG 24 hr tablet        Last Written Prescription Date:  10/12/2020  Last Fill Quantity: 360,   # refills: 0  Last Office Visit : 11/30/2020  Future Office visit:  none    simvastatin (ZOCOR) 20 MG tablet        Last Written Prescription Date:  10/12/2020  Last Fill Quantity: 90,   # refills: 0    Routing refill request to provider for review/approval because: labs

## 2021-01-27 RX ORDER — METFORMIN HCL 500 MG
2000 TABLET, EXTENDED RELEASE 24 HR ORAL
Qty: 360 TABLET | Refills: 1 | Status: SHIPPED | OUTPATIENT
Start: 2021-01-27 | End: 2021-09-14

## 2021-01-27 RX ORDER — SIMVASTATIN 20 MG
20 TABLET ORAL DAILY
Qty: 90 TABLET | Refills: 1 | Status: SHIPPED | OUTPATIENT
Start: 2021-01-27 | End: 2021-09-16

## 2021-01-27 NOTE — TELEPHONE ENCOUNTER
empagliflozin (JARDIANCE) 10 MG TABS tablet      Last Written Prescription Date:  9/23/19  Last Fill Quantity: 90,   # refills: 3  Last Office Visit : 11/30/20  Future Office visit:  None scheduled  Diabetes Education in 1-4 weeks, Diabetes Team PA in 1-3 months, me in 6 months      Routing refill request to provider for review/approval because:  Overdue for A1c  Nothing more recent in care everywhere nor media  Lab Test 04/28/20  0904 09/23/19   A1C 8.4*  --    HEMOGLOBINA1  --  7.5*       buPROPion (WELLBUTRIN SR) 150 MG 12 hr tablet      Last Written Prescription Date:  4/29/20  Last Fill Quantity: 60,   # refills: 3  Last Office Visit : 11/30/20  Future Office visit:  None scheduled  Diabetes Education in 1-4 weeks, Diabetes Team PA in 1-3 months, me in 6 months      Routing refill request to provider for review/approval because:  Gap in therapy?  How taking?

## 2021-02-01 RX ORDER — BUPROPION HYDROCHLORIDE 150 MG/1
150 TABLET, EXTENDED RELEASE ORAL 2 TIMES DAILY
Qty: 60 TABLET | Refills: 1 | Status: SHIPPED | OUTPATIENT
Start: 2021-02-01 | End: 2021-04-06

## 2021-04-06 DIAGNOSIS — E66.01 MORBID OBESITY (H): ICD-10-CM

## 2021-04-06 DIAGNOSIS — E11.9 TYPE 2 DIABETES MELLITUS WITHOUT COMPLICATION, UNSPECIFIED WHETHER LONG TERM INSULIN USE (H): ICD-10-CM

## 2021-04-06 RX ORDER — BUPROPION HYDROCHLORIDE 150 MG/1
150 TABLET, EXTENDED RELEASE ORAL 2 TIMES DAILY
Qty: 180 TABLET | Refills: 3 | Status: SHIPPED | OUTPATIENT
Start: 2021-04-06 | End: 2022-04-08

## 2021-04-06 NOTE — TELEPHONE ENCOUNTER
buPROPion (WELLBUTRIN SR) 150 MG 12 hr tablet     Last Written Prescription Date:  2/1/2021  Last Fill Quantity: 60,   # refills: 1  Last Office Visit : 2/22/2021  Future Office visit:  None    Routing refill request to provider for review/approval because:  Only a 60 day supply sent to pharm last order.  Continue same dose?    Follow up with Pt?     Refer to Provider for review       Annabelle Jean RN  Central Triage Red Flags/Med Refills

## 2021-04-25 ENCOUNTER — HEALTH MAINTENANCE LETTER (OUTPATIENT)
Age: 41
End: 2021-04-25

## 2021-05-12 DIAGNOSIS — E66.01 MORBID OBESITY (H): ICD-10-CM

## 2021-05-12 DIAGNOSIS — E11.9 TYPE 2 DIABETES MELLITUS WITHOUT COMPLICATION, UNSPECIFIED WHETHER LONG TERM INSULIN USE (H): ICD-10-CM

## 2021-05-13 NOTE — TELEPHONE ENCOUNTER
lisinopril (ZESTRIL) 10 MG tablet      Last Written Prescription Date:  10/12/20  Last Fill Quantity: 90,   # refills: 1  Last Office Visit : 11/30/20 recommended 6 month follow up  Cancelled 2/2021 appointment  Future Office visit:  None scheduled    Routing refill request to provider for review/approval because:  Overdue for labs  Lab Test 04/28/20  0904   CR 0.65     Lab Test 04/28/20  0904   POTASSIUM 4.1     Nothing more recent in care everywhere nor media  No future orders in que

## 2021-05-17 RX ORDER — LISINOPRIL 10 MG/1
10 TABLET ORAL
Qty: 90 TABLET | OUTPATIENT
Start: 2021-05-17

## 2021-05-17 NOTE — TELEPHONE ENCOUNTER
Call to [facility] pharmacy, due too refill having been sent by FAX, after receiving denial from Dr Interiano, message left for pharmacy of refill being declined, needs to be seen in clinic and have labs done for further refills. Routing again to clinic scheduling for follow up

## 2021-05-24 ENCOUNTER — TELEPHONE (OUTPATIENT)
Dept: ENDOCRINOLOGY | Facility: CLINIC | Age: 41
End: 2021-05-24

## 2021-05-24 DIAGNOSIS — E11.65 TYPE 2 DIABETES MELLITUS WITH HYPERGLYCEMIA, UNSPECIFIED WHETHER LONG TERM INSULIN USE (H): Primary | ICD-10-CM

## 2021-05-24 DIAGNOSIS — E11.9 TYPE 2 DIABETES MELLITUS WITHOUT COMPLICATION, UNSPECIFIED WHETHER LONG TERM INSULIN USE (H): ICD-10-CM

## 2021-05-24 DIAGNOSIS — E66.01 MORBID OBESITY (H): ICD-10-CM

## 2021-05-24 RX ORDER — LISINOPRIL 10 MG/1
10 TABLET ORAL
Qty: 90 TABLET | Refills: 1 | Status: SHIPPED | OUTPATIENT
Start: 2021-05-24 | End: 2021-12-03

## 2021-05-24 NOTE — TELEPHONE ENCOUNTER
Lab orders entered  To be done before his follow up with Dr Interiano. My chart message sent. I located printed the orders  In drawer if he wants them faxed elsewhere .   Sure, CMP, A1c, hemoglobin would be fine, thx    Message text      Miranda Espinoza RN on 5/24/2021 at 4:33 PM

## 2021-05-24 NOTE — TELEPHONE ENCOUNTER
Clarence has a follow up coming in June requesting lab orders to be done before the visit. Do you want to order any or  discuss what is needed at the appointment . He cancelled in February . Miranda Espinoza RN on 5/24/2021 at 1:49 PM

## 2021-05-26 DIAGNOSIS — E66.01 MORBID OBESITY (H): ICD-10-CM

## 2021-05-26 DIAGNOSIS — E11.9 TYPE 2 DIABETES MELLITUS WITHOUT COMPLICATION, UNSPECIFIED WHETHER LONG TERM INSULIN USE (H): ICD-10-CM

## 2021-05-26 NOTE — TELEPHONE ENCOUNTER
Insulin Lispro (HUMALOG KWIKPEN) 200 UNIT/ML soln        Last Written Prescription Date:  05/07/20  Last Fill Quantity: 90mL,   # refills: 3  Last Office Visit : 02/22/21  Future Office visit:  06/16/21    Routing refill request to provider for review/approval because:  Insulin - refilled per clinic

## 2021-05-26 NOTE — TELEPHONE ENCOUNTER
Central PA team  [phone]  Pool: HE PA MED ([id number])    PA has been initiated.       PA form completed and faxed insurance via Cover My Meds     Key:  [id number]     Medication:  TADALAFIL 2.5MG    Insurance:  PREFERRED ONE        Response will be received via fax and may take up to 5-10 business days depending on plan

## 2021-05-26 NOTE — TELEPHONE ENCOUNTER
Fax received from [facility] pharmacy requesting Prior Authorization    Medication Name: Tadalafil 2.5mg tablets    Insurance Plan: Preferred One   PBM:    Patient ID Number: [id number]    Please start PA process

## 2021-05-27 RX ORDER — INSULIN LISPRO 200 [IU]/ML
INJECTION, SOLUTION SUBCUTANEOUS
Qty: 90 ML | Refills: 3 | Status: SHIPPED | OUTPATIENT
Start: 2021-05-27 | End: 2023-02-14

## 2021-05-27 NOTE — TELEPHONE ENCOUNTER
Short Acting Insulin Protocol [id number]/26/2021 05:54 PM   Serum creatinine on file in past 12 months Protocol Details    HgbA1C in past 3 or 6 months      Lab orders in place  Pt notified via Rx

## 2021-05-27 NOTE — TELEPHONE ENCOUNTER
Patient Returning Call  Reason for call:  Patient called back.  Information relayed to patient:  Informed of message listed below.  Patient states understanding.  Patient has additional questions:  No  If YES, what are your questions/concerns:    Okay to leave a detailed message?: No call back needed

## 2021-05-27 NOTE — TELEPHONE ENCOUNTER
Left message for patient to call back, please relay message below.                Per insurance, PA is not required. Patient can get up to 6 tablets per month. If patient wants more than 6 tablets, he will have to pay out of pocket.

## 2021-05-28 NOTE — TELEPHONE ENCOUNTER
LMTCB.  Pt's health maintenance indicates they are due for a diabetic eye exam. This is important to manage any signs/symptoms of diabetic retinopathy. Has pt had a diabetic eye exam within the past year?  If so, where?  Clinic will call and request records.  If not, would pt like a referral?

## 2021-05-28 NOTE — TELEPHONE ENCOUNTER
Patient Returning Call  Reason for call:  The patient is returning the call  Information relayed to patient:  Information below relayed to the patient. Yes, the patient states he thinks that he had a diabetic eye exam in the past year. He will look to see where it was completed at and call with update so that records can be obtained.   Patient has additional questions:  No  If YES, what are your questions/concerns:  NA  Okay to leave a detailed message?: No

## 2021-05-30 VITALS — BODY MASS INDEX: 35.6 KG/M2 | WEIGHT: 248.1 LBS

## 2021-05-30 VITALS — WEIGHT: 246 LBS | HEIGHT: 70 IN | BODY MASS INDEX: 35.22 KG/M2

## 2021-05-30 VITALS — BODY MASS INDEX: 36.3 KG/M2 | WEIGHT: 253 LBS

## 2021-05-31 VITALS — HEIGHT: 70 IN | BODY MASS INDEX: 36.85 KG/M2 | WEIGHT: 257.4 LBS

## 2021-05-31 VITALS — BODY MASS INDEX: 35.55 KG/M2 | HEIGHT: 70 IN | WEIGHT: 248.3 LBS

## 2021-05-31 VITALS — WEIGHT: 245.4 LBS | BODY MASS INDEX: 35.21 KG/M2

## 2021-06-01 VITALS — WEIGHT: 268.06 LBS | BODY MASS INDEX: 38.38 KG/M2 | HEIGHT: 70 IN

## 2021-06-02 VITALS — WEIGHT: 265.6 LBS | HEIGHT: 70 IN | BODY MASS INDEX: 38.02 KG/M2

## 2021-06-08 NOTE — PROGRESS NOTES
Progress Note    Reason for Visit:  Chief Complaint     Diabetes Mellitus          Progress Note:    HPI:     This pleasant 36-year-old minute patient is here for follow-up of type 2 diabetes.  He's been diabetic for 8 years.    His currently taking Farxiga 5 mg once a day Lantus 65 units at bedtime metformin extended release 500 mg 4 tablets daily NovoLog he doesn't take any with breakfast 5 units with lunch and then was supple.    He has no microvascular complications.  His creatinine is 0.7 blood pressure 122/76 pulse is 76 lipid profile is well controlled LDL is 66 HDL is 46 triglycerides 79    A1c dropped from 8.5-8.4.    His blood sugar before breakfast is ranges from 1 .      Component      Latest Ref Rng 8/8/2016 2/1/2017   Triglycerides      <=149 mg/dL  79   Cholesterol      <=199 mg/dL  128   LDL Calculated      <=129 mg/dL  66   HDL Cholesterol      >=40 mg/dL  46   Patient Fasting > 8hrs?        Unknown   Creatinine      0.70 - 1.30 mg/dL 0.85 0.70   GFR MDRD Af Amer      >60 mL/min/1.73m2 >60 >60   GFR MDRD Non Af Amer      >60 mL/min/1.73m2 >60 >60   Microalbumin, Random Urine      0.00 - 1.99 mg/dL 0.56    Creatinine, Urine      mg/dL 130.4    Microalbumin/Creatinine Ratio Random Urine      <=19.9 mg/g 4.3    Hemoglobin A1c      3.5 - 6.0 % 8.5 (H) 8.4 (H)   Potassium      3.5 - 5.0 mmol/L 4.5    Free T4      0.7 - 1.8 ng/dL 0.9    TSH      0.30 - 5.00 uIU/mL 1.52        Review of Systems:    Nervous System: No headache, dizziness, fainting or memory loss. No tingling sensation of hand or feet.  Ears: No hearing loss or ringing in the ears  Eyes: No blurring of vision, redness, itching or dryness.  Nose: No nosebleed or loss of smell  Mouth: No mouth sores or loss of taste  Throat: No hoarseness or difficulty swallowing  Neck: No enlarged thyroid or lymph nodes.  Heart: No chest pain, palpitation or irregular heartbeat. No swelling of hands or feet  Lungs: No shortness of breath, cough, night  sweats, wheezing or hemoptysis.  Gastrointestinal: No nausea or vomiting, constipation or diarrhea.  No acid reflux, abdominal pain or blood in stools.  Kidney/Bladdr: No polyuria, polydipsia, nocturia or hematuria.  Genital/Sexual: No loss of libido  Skin: No rash, hair loss or hirsutism.  No abnormal striae  Muscles/Joints/Bones: No morning stiffness, muscle aches and pain or loss of height.    Current Medications:  Current Outpatient Prescriptions   Medication Sig     BABY ASPIRIN ORAL Take 81 mg by mouth daily.     canagliflozin (INVOKANA) 100 mg Tab Take 1 tablet (100 mg total) by mouth daily. Before the 1st meal of the day     CONTOUR NEXT STRIPS strips Inject 1 each under the skin 4 (four) times a day.     insulin glargine (LANTUS SOLOSTAR) 100 unit/mL (3 mL) pen Inject 65 Units under the skin bedtime.     lisinopril (PRINIVIL,ZESTRIL) 10 MG tablet Take 10 mg by mouth daily.     metFORMIN (GLUCOPHAGE-XR) 500 MG 24 hr tablet Take 4 tablets daily (total of 2,000 mg)     MULTIVITAMIN ORAL Take by mouth.     NOVOLOG FLEXPEN 100 unit/mL injection pen Inject 20 Units under the skin 3 (three) times a day.     polymyxin B-trimethoprim (FOR POLYTRIM) 10,000 unit- 1 mg/mL Drop ophthalmic drops INSTILL 1 DROP INTO EYE Q 3 H FOR 7 DAYS MAX OF 6 DOSES PER 24 H     simvastatin (ZOCOR) 20 MG tablet Take 1 tablet (20 mg total) by mouth bedtime.     VICTOZA 3-MU 0.6 mg/0.1 mL (18 mg/3 mL) PnIj injection Inject 0.3 mL (1.8 mg total) under the skin daily.       Patients Active Problems:  Patient Active Problem List   Diagnosis     Diabetes mellitus type 2, uncomplicated       History:      has no tobacco, alcohol, and drug history on file.  History   Smoking Status     Not on file   Smokeless Tobacco     Not on file      has no tobacco, alcohol, and drug history on file.  History   Sexual Activity     Sexual activity: Not on file     No past medical history on file.  No family history on file.  No past medical history on  file.  No past surgical history on file.    Vitals   weight is 253 lb (114.8 kg) (abnormal). His blood pressure is 122/76 and his pulse is 76.         Exam  General appearance: The patient looked well, not in acute distress.  Eyes: no evidence of thyroid eye disease.   Retinal exam: No evidence of diabetic retinopathy.  Mouth and Throat: Normal  Neck: No evidence of thyromegaly, enlarged lymph node or tenderness  Chest: Trachea is central. Chest is clear to auscultation and percussion. Breat sounds are normal.  Cardiovascular exam: JVP is not raised. Heart sounds are normal, no murmurs or rub  Peripheral pulses are palpable.   Abdomen: No masses or tenderness.    Back: No vertebral tenderness or kyphosis.  Extremities: No evidence of leg edema.   Skin: Normal to touch.  No abnormal striae  Neurologic exam:  Visual fields are intact by confrontation, grossly intact. No evidence of peripheral neuropathy.  Detailed foot exam normal.        Diagnosis:  No diagnosis found.    Orders:   No orders of the defined types were placed in this encounter.        Assessment and Plan:  Type 2 diabetes patient has been doing well on the current regimen he is also taking Victoza 1.8 mg daily.  He said that his blood sugar has been going up so I would increase Farxiga from 5-10 milligram once a day and I did advise him to use NovoLog as coverage or correction of 1 unit for every 50 points above 100 if that's not enough he can use 2 units for every 50 points above 100.    He lost 30 pounds his BMI dropped from 40-36.    He has no microvascular complications    He stated lisinopril 10 mg blood pressure 122/76    He has hyperlipidemia well-controlled on Zocor 20 mg.    I did spend 40 minutes with the patient more than 50% was spent on counseling and managing his care.

## 2021-06-09 NOTE — PROGRESS NOTES
Subjective:      Patient ID: Clarence Boyle is a 36 y.o. male.    Chief Complaint:    HPI Clarence Boyle is a 36 y.o. male who presents today complaining of left eye redness x 1 day. Patient also had some white puss like discharge. He was also having eye irritation. Denies fevers, cough, runny nose. Patient was around puppies yesterday for about 15 minute. He had a slight skin reaction which is is typical reaction. He doesn't recall touching his eyes, and it was at least 4 hours after the exposure that he had any eye irritation. He took a Clariten, and he hasn't had any change in his symptoms. Patient states that his eye is a little blurry when he was trying to focus on the eye chart. Patient denies any known trama to the eye, or getting anything known in the eye. He knows he washed his hands after petting the dogs.       No past medical history on file.    No past surgical history on file.    No family history on file.    Social History   Substance Use Topics     Smoking status: Never Smoker     Smokeless tobacco: Never Used     Alcohol use None       Review of Systems   Constitutional: Negative for fever.   HENT: Negative for congestion, ear pain, sneezing and sore throat.    Eyes: Positive for discharge, redness, itching and visual disturbance. Negative for pain.   Respiratory: Negative for cough and shortness of breath.    Neurological: Negative for headaches.       Objective:     /70  Pulse (!) 106  Temp 98.3  F (36.8  C) (Oral)   Resp 14  Wt (!) 248 lb 1.6 oz (112.5 kg)  SpO2 97%  BMI 35.6 kg/m2    Physical Exam   Constitutional: He appears well-developed and well-nourished. No distress.   HENT:   Head: Normocephalic and atraumatic.   Eyes: EOM are normal. Pupils are equal, round, and reactive to light. Right eye exhibits no discharge and no exudate. No foreign body present in the right eye. Left eye exhibits no discharge and no exudate. No foreign body present in the left eye. Right conjunctiva is  injected. Right conjunctiva has no hemorrhage. Left conjunctiva is not injected. Left conjunctiva has no hemorrhage.   Cardiovascular: Normal rate, regular rhythm and normal heart sounds.  Exam reveals no gallop and no friction rub.    No murmur heard.  Pulmonary/Chest: Effort normal and breath sounds normal. No respiratory distress. He has no wheezes. He has no rales.     Procedures      Assessment / Plan:     1. Allergic conjunctivitis  olopatadine (PATANOL) 0.1 % ophthalmic solution         Patient Instructions   1) warm compress with a clean wet washcloth to affected eye.  2) Apply one drop of PATANOL to both eyes twice daily.   3) If eye begins to have a thick discharge fill eye drop prescription for gentamycin and administer 1 drop to the affected eye every 4 hours during the day for 7 days. Fill this prescription if you are not having benefit from Patanol after 3 days.   4) Practice good hand hygiene.  5) Follow up if symptoms worsen or if not getting better within 7 days.

## 2021-06-10 NOTE — PROGRESS NOTES
ASSESSMENT:   1. Diabetes mellitus type 2, uncomplicated          PLAN:  I refilled patient's Novolog per Dr. Christensen's previous instructions.  Advised patient continue his current plan from Dr. Christensen. He should contact Dr. Christensen's office on Monday for further refills and to schedule follow up.  He was agreeable to plan.     SUBJECTIVE:   Clarence Boyle is a 37 y.o. male presents today requesting refill of his Novolog insulin.  He currently follows with  at [facility].  He states his Novolog was previously prescribed by a different provider outside [facility] and has never been refilled by Dr. Christensen. He had attempted to contact endocrinology on 5/1/17 but states a prescription for Victoza was sent and his Novolog was never refilled. He is currently using Tresiba at bedtime, Victoza, metformin, and Farxiga.  The Farxiga replaced Invokana, as it was not covered by insurance. He states the plan was to gradually decrease his Novolog after he started Farxiga.  He currently uses the Novolog 2 units for every 50 points above 100.  He states he typically uses it if his blood sugar is >170.  He states he typically requires Novolog 2units once a day with lunch or dinner.  Blood sugars have been running 125 fasting and low 200's postprandial.     He reports he has otherwise felt well on this regimen.    Reviewed his last note with Dr. Christensen 2/9/17:   Type 2 diabetes patient has been doing well on the current regimen he is also taking Victoza 1.8 mg daily. He said that his blood sugar has been going up so I would increase Farxiga from 5-10 milligram once a day and I did advise him to use NovoLog as coverage or correction of 1 unit for every 50 points above 100 if that's not enough he can use 2 units for every 50 points above 100.      Patient Active Problem List   Diagnosis     Diabetes mellitus type 2, uncomplicated       History   Smoking Status     Never Smoker   Smokeless Tobacco     Never Used  "      Current Medications:  Current Outpatient Prescriptions on File Prior to Visit   Medication Sig Dispense Refill     BABY ASPIRIN ORAL Take 81 mg by mouth daily.       canagliflozin (INVOKANA) 100 mg Tab Take 1 tablet (100 mg total) by mouth daily. Before the 1st meal of the day 30 tablet 5     CONTOUR NEXT STRIPS strips Inject 1 each under the skin 4 (four) times a day.  3     dapagliflozin (FARXIGA) 10 mg Tab Take 10 mg by mouth daily. 90 tablet 3     insulin degludec (TRESIBA FLEXTOUCH U-100) 100 unit/mL (3 mL) InPn Inject 65 Units under the skin bedtime. 15 mL 3     lisinopril (PRINIVIL,ZESTRIL) 10 MG tablet Take 10 mg by mouth daily.  0     loratadine (CLARITIN) 10 mg tablet Take 10 mg by mouth daily.       metFORMIN (GLUCOPHAGE-XR) 500 MG 24 hr tablet Take 4 tablets daily (total of 2,000 mg) 360 tablet 2     MULTIVITAMIN ORAL Take by mouth.       NOVOLOG FLEXPEN 100 unit/mL injection pen Inject 20 Units under the skin 3 (three) times a day.  11     olopatadine (PATANOL) 0.1 % ophthalmic solution Administer 1 drop to both eyes 2 (two) times a day. 5 mL 1     polymyxin B-trimethoprim (FOR POLYTRIM) 10,000 unit- 1 mg/mL Drop ophthalmic drops INSTILL 1 DROP INTO EYE Q 3 H FOR 7 DAYS MAX OF 6 DOSES PER 24 H  0     simvastatin (ZOCOR) 20 MG tablet Take 1 tablet (20 mg total) by mouth bedtime. 90 tablet 3     VICTOZA 3-MU 0.6 mg/0.1 mL (18 mg/3 mL) PnIj injection ADMINISTER 1.8 MG UNDER THE SKIN DAILY 9 mL 0     No current facility-administered medications on file prior to visit.        Allergies:   Allergies   Allergen Reactions     Penicillins        OBJECTIVE:   /60  Pulse 90  Temp 98.3  F (36.8  C) (Oral)   Resp 18  Ht 5' 10\" (1.778 m)  Wt (!) 246 lb (111.6 kg)  SpO2 97%  BMI 35.3 kg/m2   Physical exam reveals a pleasant 37 y.o. male.   Appears healthy, alert, cooperative and in NAD.        "

## 2021-06-12 NOTE — PROGRESS NOTES
Progress Note    Reason for Visit:  Chief Complaint     Diabetes          Progress Note:    HPI:      This pleasant 37-year-old male patient is here for follow-up because of type 2 diabetes he has been diabetic for 10 years.  He is overweight.    Component      Latest Ref Rng & Units 2/1/2017 8/1/2017   Triglycerides      <=149 mg/dL 79    Cholesterol      <=199 mg/dL 128    LDL Calculated      <=129 mg/dL 66    HDL Cholesterol      >=40 mg/dL 46    Patient Fasting > 8hrs?       Unknown    Creatinine      0.70 - 1.30 mg/dL 0.70 0.80   GFR MDRD Af Amer      >60 mL/min/1.73m2 >60 >60   GFR MDRD Non Af Amer      >60 mL/min/1.73m2 >60 >60   Microalbumin, Random Urine      0.00 - 1.99 mg/dL  <0.50   Creatinine, Urine      mg/dL  58.3   Microalbumin/Creatinine Ratio Random Urine      <=19.9 mg/g     Hemoglobin A1c      3.5 - 6.0 % 8.4 (H) 8.1 (H)   Potassium      3.5 - 5.0 mmol/L  4.3   Free T4      0.7 - 1.8 ng/dL     TSH      0.30 - 5.00 uIU/mL         Review of Systems:    Nervous System: No headache, dizziness, fainting or memory loss. No tingling sensation of hand or feet.  Ears: No hearing loss or ringing in the ears  Eyes: No blurring of vision, redness, itching or dryness.  Nose: No nosebleed or loss of smell  Mouth: No mouth sores or loss of taste  Throat: No hoarseness or difficulty swallowing  Neck: No enlarged thyroid or lymph nodes.  Heart: No chest pain, palpitation or irregular heartbeat. No swelling of hands or feet  Lungs: No shortness of breath, cough, night sweats, wheezing or hemoptysis.  Gastrointestinal: No nausea or vomiting, constipation or diarrhea.  No acid reflux, abdominal pain or blood in stools.  Kidney/Bladdr: No polyuria, polydipsia, nocturia or hematuria.  Genital/Sexual: No loss of libido  Skin: No rash, hair loss or hirsutism.  No abnormal striae  Muscles/Joints/Bones: No morning stiffness, muscle aches and pain or loss of height.    Current Medications:  Current Outpatient Prescriptions    Medication Sig     BABY ASPIRIN ORAL Take 81 mg by mouth daily.     blood glucose test (GLUCOSE BLOOD) strips Use 1 each As Directed 4 (four) times a day. Dispense brand per patient's insurance at pharmacy discretion.     dapagliflozin (FARXIGA) 10 mg Tab Take 10 mg by mouth daily.     generic lancets (FINGERSTIX LANCETS) Dispense brand per patient's insurance at pharmacy discretion.     insulin degludec (TRESIBA FLEXTOUCH U-100) 100 unit/mL (3 mL) InPn Inject 65 Units under the skin bedtime.     lisinopril (PRINIVIL,ZESTRIL) 10 MG tablet Take 10 mg by mouth daily.     loratadine (CLARITIN) 10 mg tablet Take 10 mg by mouth daily.     metFORMIN (GLUCOPHAGE-XR) 500 MG 24 hr tablet Take 4 tablets daily (total of 2,000 mg)     MULTIVITAMIN ORAL Take by mouth.     NOVOLOG FLEXPEN 100 unit/mL injection pen 1-2 units for every 50 points above 100.     olopatadine (PATANOL) 0.1 % ophthalmic solution Administer 1 drop to both eyes 2 (two) times a day.     simvastatin (ZOCOR) 20 MG tablet Take 1 tablet (20 mg total) by mouth bedtime.     VICTOZA 3-MU 0.6 mg/0.1 mL (18 mg/3 mL) PnIj injection ADMINISTER 1.8 MG UNDER THE SKIN DAILY       Patients Active Problems:  Patient Active Problem List   Diagnosis     Diabetes mellitus type 2, uncomplicated       History:   reports that he has never smoked. He has never used smokeless tobacco.   reports that he has never smoked. He has never used smokeless tobacco. His alcohol and drug histories are not on file.  History   Smoking Status     Never Smoker   Smokeless Tobacco     Never Used      reports that he has never smoked. He has never used smokeless tobacco. His alcohol and drug histories are not on file.  History   Sexual Activity     Sexual activity: Not on file     No past medical history on file.  No family history on file.  No past medical history on file.  No past surgical history on file.    Vitals   weight is 245 lb 6.4 oz (111.3 kg) (abnormal). His blood pressure is  118/78.         Exam  General appearance: The patient looked well, not in acute distress.  Eyes: no evidence of thyroid eye disease.   Retinal exam: No evidence of diabetic retinopathy.  Mouth and Throat: Normal  Neck: No evidence of thyromegaly, enlarged lymph node or tenderness  Chest: Trachea is central. Chest is clear to auscultation and percussion. Breat sounds are normal.  Cardiovascular exam: JVP is not raised. Heart sounds are normal, no murmurs or rub  Peripheral pulses are palpable.   Abdomen: No masses or tenderness.    Back: No vertebral tenderness or kyphosis.  Extremities: No evidence of leg edema.   Skin: Normal to touch.  No abnormal striae  Neurologic exam:  Visual fields are intact by confrontation, grossly intact. No evidence of peripheral neuropathy.  Detailed foot exam normal.        Diagnosis:  No diagnosis found.    Orders:   No orders of the defined types were placed in this encounter.        Assessment and Plan:  Type 2 diabetes he is on Farxiga 10 mg 3 CPR 67 units at bedtime metformin 500 mg 4 tablets daily.  He is supposed to be taking Victoza 1.8 mg but he stopped it 6 weeks ago.    He takes NovoLog 3 units before each meal he does some form of carb counting +1 unit for every 50 points above 150.    As a correction    His A1c went down from 8.4-8.1.    He lost some weight initially but now he is stopped losing weight he is overweight.    I give him I downloaded his meter his blood sugar ranges between 91 and 306 with an average of 220.    I gave him a sliding scale for NovoLog to take 8 units before breakfast and lunch and 10 before supper.    He is denying microvascular complication.  Detailed foot exam is normal.    He takes lisinopril 10 mg for hypertension blood pressures were controlled 118/78.    He takes Zocor 20 mg lipid profile is controlled.    I did advise him to restart Farxiga.    I renewed all his prescription.    Patient will return to clinic in 6 months I did spend 40  minutes with the patient more than 50% was spent on counseling and managing his care.

## 2021-06-13 NOTE — PROGRESS NOTES
[facility] Clinic Note    Clarence Boyle   37 y.o. male    Date of Visit: 11/2/2017  Chief Complaint   Patient presents with     Establish Care     Annual Exam     fasting       ASSESSMENT/PLAN  1. Health maintenance examination     2. Diabetes mellitus  Glycosylated Hemoglobin A1c    Ambulatory referral to Bariatric Care: Surgical and Non-Surgical   3. BMI 36.0-36.9,adult  Ambulatory referral to Bariatric Care: Surgical and Non-Surgical   4. ED (erectile dysfunction)     5. Screen for STD (sexually transmitted disease)  HIV Antigen/Antibody Screening [place]    Syphilis Screen, [place]    Chlamydia trachomatis & Neisseria gonorrhoeae, Amplified Detection     ---------------------------------------------    1.  Check A1c, other fasting labs have been done recently.  Counseled him on colon cancer screening.  Should start at age 40 unless there are signs of bleeding, significant change in bowel habit, etc.  His mother had polyps, and he was advised to start at age 40    2.  Improving diabetes control, A1c is down to 7.5.  Continue seeing endocrinology    3.  BMI elevated, I think that weight loss would substantially help with diabetes control, and I strongly encouraged him to exercise more, eat right, and I recommended bariatric clinic, mainly for the medical weight loss program.    4.  For erectile dysfunction, I mainly recommended weight loss, I think this would help a lot.  Cialis and Viagra are expensive, and I do not have any way around that unfortunately.  I do not suspect an organic cause for erectile dysfunction.    5.  STD screen done today.  He does have more than 1 partner as mentioned in HPI.  No STD symptoms.  He does occasionally have some chafing/rash on the dorsum of his penis.  I recommended daily a and D ointment regardless of the presence of rash or not.  He could also try Vaseline.  He can call in and I can prescribe genital herpes antiviral if this comes back, would do Valtrex.    Return for  Annual physical.      SUBJECTIVE  Clarence Boyle is a 37-year-old man here to establish care.  He would also like to have an annual physical.  He talked about several issues including diabetes.  He follows with Dr. Christensen from endocrinology.  He is on metformin, Victoza, Tresiba, farxiga, and last A1c was 8.1.  He wanted to know how important it was to lose weight.  He has her differing opinions from different endocrinologists or physicians over the years.  He does not exercise as much as he would wish, only about 1-2 times per week.  He is interested in bariatric clinic.  His wife is also overweight, he is wondering if she would like to join him on his weight loss journey.  They have done different weight loss strategies in the past, he has been as low as 225 pounds in the past, but usually is in the to 140s, particularly over the last 1 year.    He has a history of being , but was , around that time after divorce he was able to take better care of himself.  He has remarried and now has 2 children, which keep him busy.    He has been having issues with a rash on the dorsal aspect of the penile shaft just proximal to the glans.  It feels irritated, no blisters or ulcers, however.  Occasionally the skin gets broken.  This is usually after sexual intercourse.  He and his wife are both bisexual.  He has tried A&D ointment, which seems to help.  Occasionally he uses condom which all seems to help.  He has history of genital herpes, but has not had any recent outbreaks.    Occasionally, he will have difficulty maintaining an erection.  There is no clear reason.  He does not feel anxious.  He wonders if this has to do with his weight.    He would like my opinion on colon cancer screening.  1 of his cousins was diagnosed with colon cancer and is about his age.  Due to finding of a polyp in his mother, the gastroenterologist recommended that he and his siblings get screened starting at age 40.  He has 2  sisters.    ROS A comprehensive review of systems was performed and was otherwise negative    Medications, allergies, and problem list were reviewed and updated    Patient Active Problem List   Diagnosis     Diabetes mellitus type 2, uncomplicated     HTN (hypertension)     Hyperlipidemia     Urethral stricture     .     Past Medical History:   Diagnosis Date     Diabetes mellitus      Genital herpes      No past surgical history on file.  Social History     Social History     Marital status:      Spouse name: N/A     Number of children: N/A     Years of education: N/A     Occupational History     Not on file.     Social History Main Topics     Smoking status: Never Smoker     Smokeless tobacco: Never Used     Alcohol use 0.6 oz/week     1 Cans of beer per week     Drug use: No     Sexual activity: Yes     Partners: Female, Male     Birth control/ protection: Condom     Other Topics Concern     Not on file     Social History Narrative     Family History   Problem Relation Age of Onset     Cancer Mother      Gynecologic     Thyroid disease Mother      No Medical Problems Father      Cancer Maternal Grandmother      Diabetes Paternal Grandmother      Mental illness Paternal Grandmother      Colon cancer Cousin      Approximately late 30s       Current Outpatient Prescriptions   Medication Sig Dispense Refill     BABY ASPIRIN ORAL Take 81 mg by mouth daily.       blood glucose test (GLUCOSE BLOOD) strips Use 1 each As Directed 4 (four) times a day. Dispense brand per patient's insurance at pharmacy discretion. 400 each 2     dapagliflozin (FARXIGA) 10 mg Tab Take 10 mg by mouth daily. 90 tablet 3     generic lancets (FINGERSTIX LANCETS) Dispense brand per patient's insurance at pharmacy discretion. 400 each 2     insulin degludec (TRESIBA FLEXTOUCH U-100) 100 unit/mL (3 mL) InPn Inject 65 Units under the skin at bedtime. 15 mL 1     lisinopril (PRINIVIL,ZESTRIL) 10 MG tablet Take 1 tablet (10 mg total) by mouth  "daily. 90 tablet 1     metFORMIN (GLUCOPHAGE-XR) 500 MG 24 hr tablet Take 4 tablets daily (total of 2,000 mg) 360 tablet 1     MULTIVITAMIN ORAL Take by mouth.       NOVOLOG FLEXPEN 100 unit/mL injection pen 1-2 units for every 50 points above 100. 5 Pre-filled Pen Syringe 1     olopatadine (PATANOL) 0.1 % ophthalmic solution Administer 1 drop to both eyes 2 (two) times a day. 5 mL 1     pen needle, diabetic (UNIFINE PENTIPS) 31 gauge x 3/16\" Ndle Use 1 each As Directed 6 (six) times a day. 540 each 1     simvastatin (ZOCOR) 20 MG tablet Take 1 tablet (20 mg total) by mouth bedtime. 90 tablet 3     tadalafil (CIALIS) 10 MG tablet Take 1 tablet (10 mg total) by mouth daily as needed for erectile dysfunction. 8 tablet 0     VICTOZA 3-MU 0.6 mg/0.1 mL (18 mg/3 mL) PnIj injection Inject 0.1 mL (0.6 mg total) under the skin daily. 9 mL 1     No current facility-administered medications for this visit.        Allergies   Allergen Reactions     Penicillins        EXAM  Vitals:    11/02/17 0918   BP: 116/64   Pulse: 70   SpO2: 98%   Weight: (!) 248 lb 4.8 oz (112.6 kg)   Height: 5' 9.5\" (1.765 m)         General: alert, no distress  HEENT: sclerae anicteric, moist oral mucosa  Heart: Regular rate and rhythm, no murmurs.  No pretibial edema.  Warm extremities  Lungs: Clear to auscultation bilat  Gastrointestinal: abdomen is soft, non-tender, non-distended.    Skin: warm/dry, no rashes  Neuro: no gross abnormalities  Genitourinary: No lesions noted on dorsum of penis or glans  Heme/endo/lymph: No thyromegaly/nodules       RESULTS REVIEWED:     ANALYSIS AND SUMMARY OF OLD RECORDS, NOTES AND CONSULTS (2): Reviewed endocrine note on 8/8/17, status of the time Clarence stopped Victoza    RECORDS REQUESTED (1): None.     OTHER HISTORY SUMMARIZED (from nursing staff, family, friends) (2):     RADIOLOGY TESTS SUMMARIZED or REQUESTED (XRAY/CT/MRI/DXA) (1):     MEDICINE TESTS SUMMARIZED or REQUESTED (EKG/ECHO/COLONOSCOPY/EGD) (1): " None    INDEPENDENT REVIEW OF EKG OR X-RAY (2): None.    Labs ordered today    Data points  3     Marcos Brar DO  Internal Medicine  [facility]

## 2021-06-16 NOTE — PROGRESS NOTES
Progress Note    Reason for Visit:      Progress Note:    HPI:     This patient is here for follow-up because of type 2 diabetes.      Component      Latest Ref Rng & Units 8/1/2017 11/2/2017 2/5/2018   Triglycerides      <=149 mg/dL   95   Cholesterol      <=199 mg/dL   124   LDL Calculated      <=129 mg/dL   58   HDL Cholesterol      >=40 mg/dL   47   Patient Fasting > 8hrs?         Yes   Creatinine      0.70 - 1.30 mg/dL 0.80  0.74   GFR MDRD Af Amer      >60 mL/min/1.73m2 >60  >60   GFR MDRD Non Af Amer      >60 mL/min/1.73m2 >60  >60   Microalbumin, Random Urine      0.00 - 1.99 mg/dL <0.50     Creatinine, Urine      mg/dL 58.3     Microalbumin/Creatinine Ratio Random Urine      <=19.9 mg/g      Hemoglobin A1c      3.5 - 6.0 % 8.1 (H) 7.5 (H) 7.3 (H)   Potassium      3.5 - 5.0 mmol/L 4.3  4.5   Free T4      0.7 - 1.8 ng/dL      TSH      0.30 - 5.00 uIU/mL              Review of Systems:    Nervous System: No headache, dizziness, fainting or memory loss. No tingling sensation of hand or feet.  Ears: No hearing loss or ringing in the ears  Eyes: No blurring of vision, redness, itching or dryness.  Nose: No nosebleed or loss of smell  Mouth: No mouth sores or loss of taste  Throat: No hoarseness or difficulty swallowing  Neck: No enlarged thyroid or lymph nodes.  Heart: No chest pain, palpitation or irregular heartbeat. No swelling of hands or feet  Lungs: No shortness of breath, cough, night sweats, wheezing or hemoptysis.  Gastrointestinal: No nausea or vomiting, constipation or diarrhea.  No acid reflux, abdominal pain or blood in stools.  Kidney/Bladdr: No polyuria, polydipsia, nocturia or hematuria.  Genital/Sexual: No loss of libido  Skin: No rash, hair loss or hirsutism.  No abnormal striae  Muscles/Joints/Bones: No morning stiffness, muscle aches and pain or loss of height.    Current Medications:  Current Outpatient Prescriptions   Medication Sig     BABY ASPIRIN ORAL Take 81 mg by mouth daily.     blood  "glucose test (GLUCOSE BLOOD) strips Use 1 each As Directed 4 (four) times a day. Dispense brand per patient's insurance at pharmacy discretion.     dapagliflozin (FARXIGA) 10 mg Tab Take 10 mg by mouth daily.     generic lancets (FINGERSTIX LANCETS) Dispense brand per patient's insurance at pharmacy discretion.     insulin degludec (TRESIBA FLEXTOUCH U-100) 100 unit/mL (3 mL) InPn Inject 65 Units under the skin at bedtime.     lisinopril (PRINIVIL,ZESTRIL) 10 MG tablet Take 1 tablet (10 mg total) by mouth daily.     metFORMIN (GLUCOPHAGE-XR) 500 MG 24 hr tablet Take 4 tablets daily (total of 2,000 mg)     MULTIVITAMIN ORAL Take by mouth.     NOVOLOG FLEXPEN 100 unit/mL injection pen INJECT 1-2 UNIT FOR EVERY 50 POINTS ABOVE 100     olopatadine (PATANOL) 0.1 % ophthalmic solution Administer 1 drop to both eyes 2 (two) times a day.     pen needle, diabetic (UNIFINE PENTIPS) 31 gauge x 3/16\" Ndle Use 1 each As Directed 6 (six) times a day.     simvastatin (ZOCOR) 20 MG tablet Take 1 tablet (20 mg total) by mouth at bedtime.     tadalafil (CIALIS) 10 MG tablet Take 1 tablet (10 mg total) by mouth daily as needed for erectile dysfunction.     VICTOZA 3-MU 0.6 mg/0.1 mL (18 mg/3 mL) PnIj injection Inject 0.1 mL (0.6 mg total) under the skin daily.       Patients Active Problems:  Patient Active Problem List   Diagnosis     Diabetes mellitus type 2, uncomplicated     HTN (hypertension)     Hyperlipidemia     Urethral stricture     .       History:   reports that he has never smoked. He has never used smokeless tobacco. He reports that he drinks about 0.6 oz of alcohol per week  He reports that he does not use illicit drugs.   reports that he has never smoked. He has never used smokeless tobacco. He reports that he drinks about 0.6 oz of alcohol per week  He reports that he does not use illicit drugs.  History   Smoking Status     Never Smoker   Smokeless Tobacco     Never Used      reports that he has never smoked. He has " never used smokeless tobacco. He reports that he drinks about 0.6 oz of alcohol per week  He reports that he does not use illicit drugs.  History   Sexual Activity     Sexual activity: Yes     Partners: Female, Male     Birth control/ protection: Condom     Past Medical History:   Diagnosis Date     Diabetes mellitus      Genital herpes      Family History   Problem Relation Age of Onset     Cancer Mother      Gynecologic     Thyroid disease Mother      No Medical Problems Father      Cancer Maternal Grandmother      Diabetes Paternal Grandmother      Mental illness Paternal Grandmother      Colon cancer Cousin      Approximately late 30s     Past Medical History:   Diagnosis Date     Diabetes mellitus      Genital herpes      No past surgical history on file.    Vitals   vitals were not taken for this visit.        Exam  General appearance: The patient looked well, not in acute distress.  Eyes: no evidence of thyroid eye disease.   Retinal exam: No evidence of diabetic retinopathy.  Mouth and Throat: Normal  Neck: No evidence of thyromegaly, enlarged lymph node or tenderness  Chest: Trachea is central. Chest is clear to auscultation and percussion. Breat sounds are normal.  Cardiovascular exam: JVP is not raised. Heart sounds are normal, no murmurs or rub  Peripheral pulses are palpable.   Abdomen: No masses or tenderness.    Back: No vertebral tenderness or kyphosis.  Extremities: No evidence of leg edema.   Skin: Normal to touch.  No abnormal striae  Neurologic exam:  Visual fields are intact by confrontation, grossly intact. No evidence of peripheral neuropathy.  Detailed foot exam normal.        Diagnosis:  No diagnosis found.    Orders:   No orders of the defined types were placed in this encounter.        Assessment and Plan: Type 2 diabetes the patient is currently on tresiba 70 units at bedtime.    NovoLog 8 units with with breakfast and lunch and 10 with supper.    Farxiga 10 mg once a day.  Metformin 500  mg daily Victoza 1.8 mg daily.    The patient said that Victoza does help him.    His A1c dropped from 7.5-7.3.    His blood sugar before breakfast ranges between 95-1 76 during the rest of the day 85-2 31.    The patient finds that NovoLog using multiple daily insulin schedule is better than carb counting.    We discussed about diet and exercise as the best way to get better controlled the patient has put on 9 pounds.  Rather than increasing his insulin which would put him at risk of hypoglycemia.    He has hypertension on lisinopril 10 mg once a day blood pressure 100/80 no microalbuminuria no peripheral neuropathy detailed foot exam skin is intact.    He has hyperlipidemia on Zocor 20 mg LDL is 58 HDL 47 triglycerides 95 showing good control will continue as above.    Patient return to clinic in 6 month I did spend 40 minutes with the patient more than 50% was spent on counseling and managing his care we will stay on Farxiga since increased risk of foot amputation was Invokana.

## 2021-06-16 NOTE — TELEPHONE ENCOUNTER
Telephone Encounter by Alexa Barclay at 3/26/2019  2:37 PM     Author: Alexa Barclay Service: -- Author Type: --    Filed: 3/26/2019  2:43 PM Encounter Date: 3/22/2019 Status: Signed    : Alexa Barclay       Per insurance, PA is not required. Patient can get up to 6 tablets per month. If patient wants more than 6 tablets, he will have to pay out of pocket.

## 2021-06-17 NOTE — PROGRESS NOTES
Preoperative Exam    Scheduled Procedure: Urethroplasty and vasectomy  Surgery Date:  5/2/18  Surgery Location: [facility]    Surgeon:  Dr. Awad      Assessment/Plan:     1. Preop exam for internal medicine  Clarence is here for preop prior to urethroplasty and possible vasectomy.  Medical conditions are optimized, no additional testing is needed aside from what is outlined below.  There may be a slight increased risk of urinary tract infection on Farxiga, which he is not currently taking.  I would defer to urology whether or not they have seen this complicating the urethroplasty.  I am having Clarence hold the metformin the evening before the surgery, otherwise can continue to take his usual medications the day before surgery including the Lantus 70 units.  The morning of surgery, he will not take any medications.  He will remain off NSAIDs for at least 7 days and that includes the aspirin.  He can resume the aspirin after the catheter is removed following surgical recovery.  - Culture, Urine  - Urinalysis Macro & Micro  - Basic Metabolic Panel  --The serum glucose drive 166 is more accurate than urine dipstick measurement.  Ordinarily I would say the urine dipstick was thrown off by the use of SGLT2 inhibitor, though he had mentioned that he has not been on this for several days.  I am not concerned about the urine knowing that the concurrent measure of the serum glucose was 166.    2. Type 2 diabetes mellitus without complication, with long-term current use of insulin  Recheck of A1c early reflects a slight increase in A1c to 7.5.  I generally regard an A1c goal of less than 8 in approving surgery.    3. Essential hypertension  Blood pressure well controlled today, he questions whether or not he needs to be on lisinopril.  I would not make any changes with lisinopril aside from the statement that he should hold at the morning of surgery.  He does not have microalbuminuria.  With weight  loss, he probably would be able to get off the lisinopril.    4. Medication management  - Glycosylated Hemoglobin A1c        Surgical Procedure Risk: Low (reported cardiac risk generally < 1%)  Have you had prior anesthesia?: Yes  Have you or any family members had a previous anesthesia reaction:  No  Do you or any family members have a history of a clotting or bleeding disorder?: No  Cardiac Risk Assessment: increased risk for major cardiac complications based on  diabetes mellitus requiring use of insulin    Patient approved for surgery with general or local anesthesia.    Functional Status: Independent  Patient plans to recover at home with family.     Subjective:      Clarence Boyle is a 38 y.o. male who presents for a preoperative consultation.      Clarenec has a history of diabetes which has been managed by [facility].  He does note that there have been some changes including stopping Victoza due to side effects and being off of farxiga for the last couple weeks because of a delay in the refill process.  He is otherwise on Lantus insulin 70 units in the evening, NovoLog mealtime insulin, which has changed to Humalog because of insurance preferences.    He has no history of heart disease, stroke, kidney disease, nor does he have any chest pain or shortness of breath.    He does have a history of recurrent urethral stricture and is having the above procedure to have a longer term fix to this problem, as he has been dilated 1-2 times every year for a long period of time.  He is here with his wife Darcie.  He describes that they will take it perineal approach for the surgery.  He expects he will need to have an indwelling Mercedes catheter following the surgery.    He may be interested in bariatric surgery, but he is going to wait until recovered from this surgery.    He was concerned about some weight gain, but he was weighed with his shoes on and he will check again at the [facility].    All  "other systems reviewed and are negative, other than those listed in the HPI.    Pertinent History  Do you have difficulty breathing or chest pain after walking up a flight of stairs: No  History of obstructive sleep apnea: No  Steroid use in the last 6 months: No  Frequent Aspirin/NSAID use: Yes: holding  Prior Blood Transfusion: No  Prior Blood Transfusion Reaction: No  If for some reason prior to, during or after the procedure, if it is medically indicated, would you be willing to have a blood transfusion?:  There is no transfusion refusal.    Current Outpatient Prescriptions   Medication Sig Dispense Refill     blood glucose test (GLUCOSE BLOOD) strips Use 1 each As Directed 4 (four) times a day. Dispense brand per patient's insurance at pharmacy discretion. 400 each 2     generic lancets (FINGERSTIX LANCETS) Dispense brand per patient's insurance at pharmacy discretion. 400 each 2     insulin glargine (LANTUS SOLOSTAR U-100 INSULIN) 100 unit/mL (3 mL) pen Inject 70 Units under the skin at bedtime. 21 adj dose pen 1     insulin lispro (HUMALOG KWIKPEN INSULIN) 100 unit/mL pen Inject 8 units with breakfast and lunch and 10 units with supper. 5 adj dose pen 1     lisinopril (PRINIVIL,ZESTRIL) 10 MG tablet TAKE 1 TABLET(10 MG) BY MOUTH DAILY 90 tablet 1     metFORMIN (GLUCOPHAGE-XR) 500 MG 24 hr tablet Take 4 tablets daily (total of 2,000 mg) 360 tablet 1     MULTIVITAMIN ORAL Take by mouth.       pen needle, diabetic (UNIFINE PENTIPS) 31 gauge x 3/16\" Ndle Use 1 each As Directed 6 (six) times a day. 540 each 1     simvastatin (ZOCOR) 20 MG tablet Take 1 tablet (20 mg total) by mouth at bedtime. 90 tablet 1     tadalafil (CIALIS) 10 MG tablet Take 1 tablet (10 mg total) by mouth daily as needed for erectile dysfunction. 8 tablet 0     BABY ASPIRIN ORAL Take 81 mg by mouth daily.       dapagliflozin (FARXIGA) 10 mg Tab Take 10 mg by mouth daily. 90 tablet 3     olopatadine (PATANOL) 0.1 % ophthalmic solution " "Administer 1 drop to both eyes 2 (two) times a day. 5 mL 1     No current facility-administered medications for this visit.         Allergies   Allergen Reactions     Penicillins      Victoza [Liraglutide]      Stomach upset, vomiting        Patient Active Problem List   Diagnosis     Diabetes mellitus type 2, uncomplicated     HTN (hypertension)     Hyperlipidemia     Urethral stricture     .       Past Medical History:   Diagnosis Date     Diabetes mellitus      Genital herpes        No past surgical history on file.    Social History     Social History     Marital status:      Spouse name: N/A     Number of children: N/A     Years of education: N/A     Occupational History     Not on file.     Social History Main Topics     Smoking status: Never Smoker     Smokeless tobacco: Never Used     Alcohol use 0.6 oz/week     1 Cans of beer per week     Drug use: No     Sexual activity: Yes     Partners: Female, Male     Birth control/ protection: Condom     Other Topics Concern     Not on file     Social History Narrative       Patient Care Team:  Marcos Brar DO as PCP - General (Internal Medicine)  Dr. Awad as Surgeon (Urology)          Objective:     Vitals:    04/27/18 1118   BP: 112/74   Pulse: 80   SpO2: 97%   Weight: (!) 268 lb 1 oz (121.6 kg)   Height: 5' 10\" (1.778 m)         Physical Exam:  General: alert, no distress  HEENT: sclerae anicteric, moist oral mucosa  Heart: Regular rate and rhythm, no murmurs.  No pretibial edema.    Lungs: Clear to auscultation bilat  Gastrointestinal: abdomen is non-distended.    Skin: warm/dry, no rashes  Neuro: no gross abnormalities  Psychiatric: Pleasant affect    Reviewed urology note from [facility] on 4/16/18, also noted that he was pursuing vasectomy at that time       Patient Instructions   Do not take aspirin or supplements seven days prior to your surgery  (resume aspirin after catheter out)     Do not take NSAIDs (Advil, Aleve, ibuprofen, naproxen, etc.) " five days prior to your surgery  Tylenol/acetaminophen is safe around the time of surgery    On the morning of surgery-- hold all medications.  Take Lantus the night before           Labs:  Recent Results (from the past 240 hour(s))   Culture, Urine   Result Value Ref Range    Culture No Growth    Urinalysis Macro & Micro   Result Value Ref Range    Color, UA Yellow Colorless, Yellow, Straw, Light Yellow    Clarity, UA Clear Clear    Glucose, UA >1000 mg/dL (!!) Negative    Bilirubin, UA Negative Negative    Ketones, UA Trace (!!) Negative    Specific Gravity, UA 1.029 1.001 - 1.030    Blood, UA Negative Negative    pH, UA 6.0 4.5 - 8.0    Protein, UA Negative Negative mg/dL    Urobilinogen, UA <2.0 E.U./dL <2.0 E.U./dL, 2.0 E.U./dL    Nitrite, UA Negative Negative    Leukocytes, UA Negative Negative    Bacteria, UA None Seen None Seen hpf    RBC, UA 0-2 None Seen, 0-2 hpf    WBC, UA 0-5 None Seen, 0-5 hpf    Squam Epithel, UA 5-10 (!) None Seen, 0-5 lpf    Mucus, UA Few (!) None Seen lpf    Sperm, UA Present (!) None Seen   Basic Metabolic Panel   Result Value Ref Range    Sodium 138 136 - 145 mmol/L    Potassium 4.6 3.5 - 5.0 mmol/L    Chloride 105 98 - 107 mmol/L    CO2 23 22 - 31 mmol/L    Anion Gap, Calculation 10 5 - 18 mmol/L    Glucose 166 (H) 70 - 125 mg/dL    Calcium 9.4 8.5 - 10.5 mg/dL    BUN 20 8 - 22 mg/dL    Creatinine 0.73 0.70 - 1.30 mg/dL    GFR MDRD Af Amer >60 >60 mL/min/1.73m2    GFR MDRD Non Af Amer >60 >60 mL/min/1.73m2   Glycosylated Hemoglobin A1c   Result Value Ref Range    Hemoglobin A1c 7.5 (H) 3.5 - 6.0 %         Immunization History   Administered Date(s) Administered     DTP 1980, 1980, 1980, 11/02/1984     Hep B, Adult 04/12/1999, 09/11/2013     Hep B, Peds or Adolescent 08/05/1997, 02/23/1998, 04/12/1999     Hepatitis A, Ped/Adol 2 Dose IM (18yr & under) 02/21/2000     Influenza,seasonal quad, PF, 36+MOS 08/28/2014, 10/27/2015, 10/15/2016, 10/23/2017     MMR  08/31/1981, 10/18/1989, 08/05/1992     OPV,Trivalent,Historic(9205-3737 only) 1980, 1980, 10/12/1984, 04/10/1985     Pneumo Polysac 23-V 02/21/2000, 11/17/2008     Td, Adult, Absorbed 11/10/1995     Tdap 06/01/2007, 03/20/2013           Electronically signed by aMrcos Brar DO 04/30/18 11:16 AM

## 2021-06-23 NOTE — PATIENT INSTRUCTIONS - HE
Return for fasting lab work    Weight loss is recommended.  If you want to visit with a dietician, let me know    Make sure you are getting 90+ minutes of exercise per week

## 2021-06-23 NOTE — PROGRESS NOTES
[facility] Clinic Note    Clarence PHONG Boyle   38 y.o. male    Date of Visit: 1/24/2019  Chief Complaint   Patient presents with     Annual Exam     NOT fasting; GENNARO signed for eye doctor for Diabetes       ASSESSMENT/PLAN  1. Health maintenance examination     2. Essential hypertension     3. Type 2 diabetes mellitus without complication, with long-term current use of insulin (H)  Lipid Cascade   4. Hyperlipidemia, unspecified hyperlipidemia type     5. H/O urethral stricture     6. Dribbling urine       ---------------------------------------------    1. Discussed weight, exercise, diet, return for fasting lipid panel.  Central to his health care in general, will be very important to lose weight.  He had considered bariatric clinic, but did not follow through with it after we referred him.  I strongly recommended that he pursue weight loss measures, as his diabetes would be better controlled, he probably would not have the degree of erectile issues that he is experiencing.    2. BP well controlled    3.  He missed his endocrinology appointment today because of a traffic jam.  I reviewed with him that he is on a high amount of insulin, and that he could reduce this amount if not get off of insulin with weight loss.  I think that it is imperative that he does a better job with diet and exercise.  I want him to focus on this.  I would not change his insulin at this time.  I do think he should follow-up with endocrinology, reschedule his visit that he missed today.  We did not have blood sugars to review today, limiting my ability to help him today.    4.  Continue simvastatin    5.  Better stream, but having dribbling after void.  It sounded at first like he was having urge incontinence, but this is not the case.  He has follow-up coming up with urology soon.  It sounds like he had a very good results in treating the stricture.    Return in about 1 year (around 1/24/2020) for Annual physical.      SUBJECTIVE  Clarence DARLING  "[place] is here for follow-up.     The A1 is 7.7, admits to dietary indiscretion and \"bad habits.\"  He will reschedule with Dr. Christensen.      He had surgery in May for urethral stricture, but had a prolonged recovery in his own eyes, had prolonged swelling, hematoma.  The procedure was Posterior Urethroplasty with Single Buccal Mucosa Graft by Dr. Awad at [facility].  No urine incontinence was documented at the last office visit in August 2018.  It seems like the incontinence is a little more noticeable now, but difficult for me to assess if this is worse than before.    He is having some leakage daily, wearing a pad.  He is urinating better in terms of flow, however.  There is some ED, sometimes it will not maintain and just very sensitive.  He does not have trouble initiating the erection.  We talked about his weight and the possible contribution of this for not being able to sustain an erection.    He expressed some interest in weight loss program, but didn't follow through on it.  He has not been exercising.    BP well controlled.    He has been trying to sell his home, moving to [place].        ROS A comprehensive review of systems was performed and was otherwise negative    Medications, allergies, and problem list were reviewed and updated    Patient Active Problem List   Diagnosis     Diabetes mellitus type 2, uncomplicated (H)     HTN (hypertension)     Hyperlipidemia     H/O urethral stricture     ED (erectile dysfunction)     Dribbling urine     Past Medical History:   Diagnosis Date     Diabetes mellitus (H)      Genital herpes      No past surgical history on file.  Social History     Socioeconomic History     Marital status:      Spouse name: Not on file     Number of children: Not on file     Years of education: Not on file     Highest education level: Not on file   Social Needs     Financial resource strain: Not on file     Food insecurity - worry: Not on file     Food insecurity - inability: Not on " "file     Transportation needs - medical: Not on file     Transportation needs - non-medical: Not on file   Occupational History     Not on file   Tobacco Use     Smoking status: Never Smoker     Smokeless tobacco: Never Used   Substance and Sexual Activity     Alcohol use: Yes     Alcohol/week: 0.6 oz     Types: 1 Cans of beer per week     Drug use: No     Sexual activity: Yes     Partners: Female, Male     Birth control/protection: Condom   Other Topics Concern     Not on file   Social History Narrative     Not on file     Family History   Problem Relation Age of Onset     Cancer Mother         Gynecologic     Thyroid disease Mother      No Medical Problems Father      Cancer Maternal Grandmother      Diabetes Paternal Grandmother      Mental illness Paternal Grandmother      Colon cancer Cousin         Approximately late 30s       Current Outpatient Medications   Medication Sig Dispense Refill     BABY ASPIRIN ORAL Take 81 mg by mouth daily.       blood glucose test (GLUCOSE BLOOD) strips Use 1 each As Directed 4 (four) times a day. Dispense brand per patient's insurance at pharmacy discretion. 400 strip 1     generic lancets (FINGERSTIX LANCETS) Dispense brand per patient's insurance at pharmacy discretion. 400 each 2     HUMALOG KWIKPEN INSULIN 100 unit/mL pen INJECT 12 UNITS UNDER THE SKIN WITH BREAKFAST AND LUNCH, AND 15 UNITS WITH SUPPER 36 mL 0     JARDIANCE 10 mg Tab TAKE 1 TABLET(10 MG) BY MOUTH DAILY 90 tablet 0     LANTUS SOLOSTAR U-100 INSULIN 100 unit/mL (3 mL) pen ADMINISTER 70 UNITS UNDER THE SKIN AT BEDTIME 63 mL 0     lisinopril (PRINIVIL,ZESTRIL) 10 MG tablet TAKE 1 TABLET(10 MG) BY MOUTH DAILY 90 tablet 0     metFORMIN (GLUCOPHAGE-XR) 500 MG 24 hr tablet TAKE 4 TABLETS(2000 MG) BY MOUTH DAILY 120 tablet 0     MULTIVITAMIN ORAL Take by mouth.       pen needle, diabetic (UNIFINE PENTIPS) 31 gauge x 3/16\" Ndle Use 1 each As Directed 6 (six) times a day. 540 each 1     simvastatin (ZOCOR) 20 MG " "tablet TAKE 1 TABLET(20 MG) BY MOUTH AT BEDTIME 90 tablet 0     tadalafil (CIALIS) 10 MG tablet Take 1 tablet (10 mg total) by mouth daily as needed for erectile dysfunction. 8 tablet 0     olopatadine (PATANOL) 0.1 % ophthalmic solution Administer 1 drop to both eyes 2 (two) times a day. 5 mL 1     No current facility-administered medications for this visit.        Allergies   Allergen Reactions     Penicillins      Victoza [Liraglutide]      Stomach upset, vomiting        EXAM  Vitals:    01/24/19 1047   BP: 108/70   Patient Site: Left Arm   Patient Position: Sitting   Cuff Size: Adult Large   SpO2: 97%   Weight: (!) 265 lb 9.6 oz (120.5 kg)   Height: 5' 9.5\" (1.765 m)         General: alert, no distress  HEENT: sclerae anicteric, moist oral mucosa  Heart: Regular rate and rhythm, no murmurs.  No pretibial edema.  Warm extremities  Lungs: Clear to auscultation bilat  Gastrointestinal: abdomen is soft, non-tender, non-distended.    Skin: warm/dry, no rashes  Neuro: no gross abnormalities        Marcos Brar,   Internal Medicine  [facility]  "

## 2021-07-03 NOTE — ADDENDUM NOTE
Addendum Note by Ruby Brar DO at 1/24/2019 10:40 AM     Author: Ruby Brar DO Service: -- Author Type: Physician    Filed: 1/25/2019  1:14 PM Encounter Date: 1/24/2019 Status: Signed    : Ruby Brar DO (Physician)    Addended by: RUBY BRAR on: 1/25/2019 01:14 PM        Modules accepted: Orders

## 2021-07-03 NOTE — ADDENDUM NOTE
Addendum Note by Ruby Brar DO at 1/24/2019 10:40 AM     Author: Ruby Brar DO Service: -- Author Type: Physician    Filed: 1/25/2019  1:19 PM Encounter Date: 1/24/2019 Status: Signed    : Ruby Brar DO (Physician)    Addended by: RUBY BRAR on: 1/25/2019 01:19 PM        Modules accepted: Orders

## 2021-07-03 NOTE — ADDENDUM NOTE
Addendum Note by Ruby Brar DO at 1/24/2019 10:40 AM     Author: Ruby Brar DO Service: -- Author Type: Physician    Filed: 1/25/2019  1:15 PM Encounter Date: 1/24/2019 Status: Signed    : Ruby Brar DO (Physician)    Addended by: RUBY BRAR on: 1/25/2019 01:15 PM        Modules accepted: Orders

## 2021-07-03 NOTE — ADDENDUM NOTE
Addendum Note by Ruby Brar DO at 7/11/2019  2:48 PM     Author: Ruby Brar DO Service: -- Author Type: Physician    Filed: 7/11/2019  2:48 PM Encounter Date: 6/14/2019 Status: Signed    : Ruby Brar DO (Physician)    Addended by: RUBY BRAR on: 7/11/2019 02:48 PM        Modules accepted: Orders

## 2021-08-06 ENCOUNTER — VIRTUAL VISIT (OUTPATIENT)
Dept: URGENT CARE | Facility: CLINIC | Age: 41
End: 2021-08-06
Payer: COMMERCIAL

## 2021-08-06 DIAGNOSIS — B30.9 VIRAL CONJUNCTIVITIS: Primary | ICD-10-CM

## 2021-08-06 DIAGNOSIS — U07.1 2019 NOVEL CORONAVIRUS DISEASE (COVID-19): ICD-10-CM

## 2021-08-06 PROCEDURE — 99212 OFFICE O/P EST SF 10 MIN: CPT | Performed by: NURSE PRACTITIONER

## 2021-08-06 NOTE — PROGRESS NOTES
"  Clarence Boyle is a 41 year old male who is being evaluated via a billable telephone visit.      The patient has been notified of following:     \"This telephone visit will be conducted via a call between you and your physician/provider. We have found that certain health care needs can be provided without the need for a physical exam.  This service lets us provide the care you need with a short phone conversation.  If a prescription is necessary we can send it directly to your pharmacy.  If lab work is needed we can place an order for that and you can then stop by our lab to have the test done at a later time.    If during the course of the call the physician/provider feels a telephone visit is not appropriate, you will not be charged for this service.\"     Patient has given verbal consent for Telephone visit?  YES     ASSESSMENT:       ICD-10-CM    1. Viral conjunctivitis  B30.9    2. 2019 novel coronavirus disease (COVID-19)  U07.1          Worrisome symptoms discussed with instructions to go to the ED.  Patient verbalized understanding and agreed with this plan.  Chief Complaint:    Chief Complaint   Patient presents with     Conjunctivitis       HPI: Clarence Boyle is an 41 year old male who calls with concerns that include left eye mattered and crusted over He is positive for COVID despite being vaccinated a couple months ago.  He was traveling out of state for the last 10 days.  ROS:      Past Medical History  Past Medical History:   Diagnosis Date     Diabetes (H)      Hypertension      Obese        Allergies:  Allergies   Allergen Reactions     Liraglutide      Stomach upset, vomiting      Penicillins         Current Meds:    Current Outpatient Medications:      acetaminophen (TYLENOL) 325 MG tablet, Take 1-2 tablets (325-650 mg) by mouth every 6 hours as needed for mild pain, Disp: 45 tablet, Rfl: 0     blood glucose (NO BRAND SPECIFIED) test strip, Use to test blood sugar 3 times daily or as directed. Any Brand " covered by insurance that meets Pt need., Disp: 300 strip, Rfl: 3     blood glucose monitoring (MARYURI CONTOUR) test strip, Dispense test strips covered by the patient insurance. Test 3 times per day., Disp: , Rfl:      blood glucose monitoring (NO BRAND SPECIFIED) meter device kit, Use to test blood sugar 3 times daily or as directed. Any Brand covered by insurance., Disp: 1 kit, Rfl: 1     buPROPion (WELLBUTRIN SR) 150 MG 12 hr tablet, Take 1 tablet (150 mg) by mouth 2 times daily, Disp: 180 tablet, Rfl: 3     CONTRAVE 8-90 MG per 12 hr tablet, 1 tab daily by mouth x wk#1, then increase to 1 tab am + 1 tab pm x wk #2. Then, 2 tabs am + 1 tab barber wk #3, then 2 tabs am + 2 tabs pm, Disp: 120 tablet, Rfl: 3     empagliflozin (JARDIANCE) 10 MG TABS tablet, Take 1 tablet (10 mg) by mouth daily, Disp: 90 tablet, Rfl: 1     Insulin Glargine (LANTUS SC), Inject 70 Units Subcutaneous At Bedtime, Disp: , Rfl:      insulin glargine (LANTUS SOLOSTAR) 100 UNIT/ML pen, INJECT 70 UNITS UNDER THE SKIN EVERY NIGHT AT BEDTIME, Disp: 45 mL, Rfl: 11     Insulin Lispro (HUMALOG KWIKPEN) 200 UNIT/ML soln, Inject subcu 2 units per 10G CHO, correction scale of 1 units per 50 above 140. You can take 1/2 of this correction at bedtime. Approx 80 units daily. Lab draw needed. Call [facility] to schedule., Disp: 90 mL, Rfl: 3     insulin lispro (HUMALOG PEN) 100 UNIT/ML injection, 200 Units , Disp: , Rfl:      insulin pen needle (B-D U/F) 31G X 8 MM, FOR ADMINISTERING INSULIN (3 HUMALOG AND 1 LANTUS), Disp: , Rfl:      lisinopril (ZESTRIL) 10 MG tablet, Take 1 tablet (10 mg) by mouth daily (with dinner), Disp: 90 tablet, Rfl: 1     loratadine (CLARITIN) 10 MG tablet, Take 10 mg by mouth, Disp: , Rfl:      metFORMIN (GLUCOPHAGE-XR) 500 MG 24 hr tablet, Take 4 tablets (2,000 mg) by mouth daily (with dinner), Disp: 360 tablet, Rfl: 1     Multiple Vitamins-Minerals (MULTI COMPLETE PO), , Disp: , Rfl:      naltrexone (DEPADE/REVIA) 50 MG tablet,  Take 1/2 tablet x 3 days.  Time it one to two hours prior to worst cravings.  Then increase to one full tablet as instructed, daily., Disp: 30 tablet, Rfl: 3     simvastatin (ZOCOR) 20 MG tablet, Take 1 tablet (20 mg) by mouth daily, Disp: 90 tablet, Rfl: 1     tadalafil (CIALIS) 10 MG tablet, Take 10 mg by mouth, Disp: , Rfl:      PHYSICAL EXAM:     Patient is alert and oriented. Able to speak in full sentences. No wheezing or cough heard during telephone conversation. Mood is appropriate.     Pamela Cuevas CNP  8/6/2021, 12:05 PM      Phone call duration:  13 minutes

## 2021-08-06 NOTE — PATIENT INSTRUCTIONS
Patient Education     Viral Conjunctivitis    Viral conjunctivitis is sometimes called pink eye. It's a common infection of the eye. It's very contagious. Touching the infected eye and then touching another person spreads this infection. It can also be spread from one eye to the other in the same way. The most common symptoms include redness, discharge from the eye, swollen eyelids, and a gritty or scratchy feeling in the eye.   This condition usually takes about 7 to 10 days to go away. Artificial tears are often advised to moisten and clean the eyes. This product is available without a prescription. Antibiotic eye drops often are not recommended because they don't kill viruses. But sometimes they may be prescribed by eye doctors. This is to prevent a second, bacterial infection.   Home care    Apply a towel soaked in cool water to the affected eye 3 to 4 times a day (just before applying medicine to the eye).    It's common to have mucus drainage during the night, causing the eyelids to become crusted by morning. Use a warm, wet cloth to gently wipe this away.    Wash any cloths used to clean the eye after one use. Don't reuse them.    If antibiotic medicines are prescribed, take them exactly as directed. Don't stop taking them until you are told to do so.    You may use acetaminophen or ibuprofen to control pain, unless another medicine was prescribed. If you have chronic liver or kidney disease, talk with your healthcare provider before using these medicines. Also talk with your provider if you have ever had a stomach ulcer or gastrointestinal bleeding. Aspirin should never be used in anyone under 18 years of age who is ill with a fever. It may cause severe liver damage.    Wash your hands before and after touching the affected eye. This helps to prevent spreading the infection to your other eye and to other people.    The infected person should avoid sharing towels, washcloths, and bedding with others. This  is to prevent spreading the infection.    This illness is contagious during the first week. Children with this illness should be kept out of day care and school until the redness clears.    Follow-up care  Follow up with your healthcare provider, or as advised.  When to seek medical advice  Call your healthcare provider right away if any of these occur:    Vision gets worse    Eye pain gets worse    Swelling or redness of the eyelid gets worse    Redness spreads to the skin around the eye    Large amount of green or yellow drains from the eye    Severe itching in or around the eye    Fever of 100.4 F (38 C) or higher, or as directed by your healthcare provider  Cassia last reviewed this educational content on 2/1/2020 2000-2021 [employer]. All rights reserved. This information is not intended as a substitute for professional medical care. Always follow your healthcare professional's instructions.

## 2021-09-10 DIAGNOSIS — E66.01 MORBID OBESITY (H): ICD-10-CM

## 2021-09-10 DIAGNOSIS — E11.9 TYPE 2 DIABETES MELLITUS WITHOUT COMPLICATION, UNSPECIFIED WHETHER LONG TERM INSULIN USE (H): ICD-10-CM

## 2021-09-13 DIAGNOSIS — E66.01 MORBID OBESITY (H): ICD-10-CM

## 2021-09-13 DIAGNOSIS — E11.9 TYPE 2 DIABETES MELLITUS WITHOUT COMPLICATION, UNSPECIFIED WHETHER LONG TERM INSULIN USE (H): ICD-10-CM

## 2021-09-14 DIAGNOSIS — E11.9 TYPE 2 DIABETES MELLITUS WITHOUT COMPLICATION, UNSPECIFIED WHETHER LONG TERM INSULIN USE (H): ICD-10-CM

## 2021-09-14 DIAGNOSIS — E11.65 TYPE 2 DIABETES MELLITUS WITH HYPERGLYCEMIA, UNSPECIFIED WHETHER LONG TERM INSULIN USE (H): Primary | ICD-10-CM

## 2021-09-14 DIAGNOSIS — E66.01 MORBID OBESITY (H): ICD-10-CM

## 2021-09-14 RX ORDER — METFORMIN HCL 500 MG
2000 TABLET, EXTENDED RELEASE 24 HR ORAL
Qty: 120 TABLET | Refills: 3 | Status: SHIPPED | OUTPATIENT
Start: 2021-09-14 | End: 2022-03-06

## 2021-09-14 RX ORDER — PEN NEEDLE, DIABETIC 32GX 5/32"
NEEDLE, DISPOSABLE MISCELLANEOUS
Qty: 450 EACH | Refills: 3 | Status: SHIPPED | OUTPATIENT
Start: 2021-09-14

## 2021-09-14 NOTE — TELEPHONE ENCOUNTER
METFORMIN ER 500MG 24HR TABS  Last Written Prescription Date:  1/27/2021  Last Fill Quantity: 360,   # refills: 1  Last Office Visit : 6/16/2021  Future Office visit:  11/8/2021    Routing refill request to provider for review/approval because:  Abnormal A1C and Creatinine  Refer to Provider for review     Recent Labs   Lab Test 04/28/20 0904 09/23/19  0000   A1C 8.4*  --    HEMOGLOBINA1  --  7.5*     Patient's CR is NOT>1.4 OR Patient's EGFR is NOT<45 within past 12 mos.        Recent Labs   Lab Test 04/28/20 0904   GFRESTIMATED >90   GFRESTBLACK >90          Recent Labs   Lab Test 04/28/20 0904   CR 0.65*       Annabelle Jean RN  Central Triage Red Flags/Med Refills

## 2021-09-16 RX ORDER — SIMVASTATIN 20 MG
TABLET ORAL
Qty: 90 TABLET | Refills: 3 | Status: SHIPPED | OUTPATIENT
Start: 2021-09-16 | End: 2022-09-27

## 2021-09-16 RX ORDER — EMPAGLIFLOZIN 10 MG/1
TABLET, FILM COATED ORAL
Qty: 90 TABLET | Refills: 3 | Status: SHIPPED | OUTPATIENT
Start: 2021-09-16 | End: 2022-09-27

## 2021-09-16 NOTE — TELEPHONE ENCOUNTER
SIMVASTATIN 20MG TABLETS      Last Written Prescription Date:  1-27-21  Last Fill Quantity: 90,   # refills: 1  Last Office Visit : 11-30-20 ( RTC 6 m)  Future Office visit:  11-8-21    Routing refill request to provider for review/approval because:  Overdue lab: LDL

## 2021-09-16 NOTE — TELEPHONE ENCOUNTER
JARDIANCE 10MG TABLETS      Last Written Prescription Date:  2-1-21  Last Fill Quantity: 90,   # refills: 1  Last Office Visit : 11-30-20  Future Office visit:  11-8-21    Routing refill request to provider for review/approval because:  Overdue labs: A1c, Cr, K, GFR

## 2021-10-10 ENCOUNTER — HEALTH MAINTENANCE LETTER (OUTPATIENT)
Age: 41
End: 2021-10-10

## 2021-10-19 DIAGNOSIS — E11.9 TYPE 2 DIABETES MELLITUS WITHOUT COMPLICATION, UNSPECIFIED WHETHER LONG TERM INSULIN USE (H): ICD-10-CM

## 2021-10-19 DIAGNOSIS — E66.01 MORBID OBESITY (H): ICD-10-CM

## 2021-10-20 NOTE — TELEPHONE ENCOUNTER
KATIUSKA CHANG PEN INJ 3ML  Last Written Prescription Date:  9/22/2020  Last Fill Quantity: 45,   # refills: 11  Last Office Visit : 2/22/2021  Future Office visit:  11/8/2021    Routing refill request to provider for review/approval because:  Drug not on the [facility], [facility] or [facility] refill protocol or controlled substance      Annabelle Jean RN  Central Triage Red Flags/Med Refills

## 2021-10-27 ENCOUNTER — IMMUNIZATION (OUTPATIENT)
Dept: FAMILY MEDICINE | Facility: CLINIC | Age: 41
End: 2021-10-27
Payer: COMMERCIAL

## 2021-10-27 PROCEDURE — 90471 IMMUNIZATION ADMIN: CPT

## 2021-10-27 PROCEDURE — 90686 IIV4 VACC NO PRSV 0.5 ML IM: CPT

## 2021-11-04 ENCOUNTER — LAB (OUTPATIENT)
Dept: LAB | Facility: CLINIC | Age: 41
End: 2021-11-04
Payer: COMMERCIAL

## 2021-11-04 DIAGNOSIS — E11.65 TYPE 2 DIABETES MELLITUS WITH HYPERGLYCEMIA, UNSPECIFIED WHETHER LONG TERM INSULIN USE (H): ICD-10-CM

## 2021-11-04 LAB
HBA1C MFR BLD: 8.3 % (ref 0–5.6)
HGB BLD-MCNC: 15.1 G/DL (ref 13.3–17.7)

## 2021-11-04 PROCEDURE — 83036 HEMOGLOBIN GLYCOSYLATED A1C: CPT

## 2021-11-04 PROCEDURE — 80053 COMPREHEN METABOLIC PANEL: CPT

## 2021-11-04 PROCEDURE — 85018 HEMOGLOBIN: CPT

## 2021-11-04 PROCEDURE — 36415 COLL VENOUS BLD VENIPUNCTURE: CPT

## 2021-11-04 NOTE — PROGRESS NOTES
Clarence is a 41 year old who is being evaluated via a billable video visit.      How would you like to obtain your AVS? MyChart  If the video visit is dropped, the invitation should be resent by: Text to cell phone: [phone]  Will anyone else be joining your video visit? No    Diabetes & Endocrinology Consult Follow up Note    Reason for visit/consult: Type 2 DM without known complications, due to labs, Obesity BMI 37    He needs new meter today     Primary care provider: needs one     Today, for his diabetes, he reports he is taking:  Metformin #4 tabs daily, Lantus 70 units at hs, Zocor 20 mg, Jardiance 10 mg, Lisinopril 10 mg, Humalog I:C ratio 3:1 or 2:1 depending on what he feels like doing, w/ meals not with snacks and was using approx 1 per 25>140.    (got sick on Victoza in past)     Is working on CHO counting    He is not: checking blood sugars at least daily    Blood sugars are: objective: none, subjective: none, data is >2 weeks old, had a low overnight >2 weeks ago & felt shaky sweaty, he did not report eating at bedtime and/or taking any short acting insulin then    He has not followed up with Diabetes Education since last visit w/ me 1 year ago. He lost his meter.    Lab Results   Component Value Date    A1C 8.3 11/04/2021     Patient Active Problem List     Diabetes mellitus type 2, uncomplicated (H)     HTN (hypertension)     Hyperlipidemia     H/O urethral stricture     ED (erectile dysfunction)     Dribbling urine     Past Medical/Surgical History:  Past Medical History:   Diagnosis Date     Diabetes (H)      Hypertension      Obese      Past Surgical History:   Procedure Laterality Date     URETHROPLASTY STAGE ONE WITH BUCCAL GRAFT N/A 5/2/2018    Procedure: URETHROPLASTY STAGE ONE WITH BUCCAL GRAFT;  Posterior Urethroplasty with Single Buccal Mucosa Graft;  Surgeon: Herb Awad MD;  [facility]       Allergies:  Allergies   Allergen Reactions     Liraglutide      Stomach upset,  vomiting      Penicillins        Current Medications MEDICATIONS IN THIS CHART MAY NOT BE ACCURATE.    Current Outpatient Medications   Medication     blood glucose (NO BRAND SPECIFIED) test strip     blood glucose monitoring (MARYURI CONTOUR) test strip     blood glucose monitoring (NO BRAND SPECIFIED) meter device kit     buPROPion (WELLBUTRIN SR) 150 MG 12 hr tablet     CONTRAVE 8-90 MG per 12 hr tablet     Insulin Glargine (LANTUS SC)     insulin glargine (LANTUS SOLOSTAR) 100 UNIT/ML pen     Insulin Lispro (HUMALOG KWIKPEN) 200 UNIT/ML soln     insulin lispro (HUMALOG PEN) 100 UNIT/ML injection     insulin pen needle (B-D U/F) 31G X 8 MM     insulin pen needle (BD TONY U/F) 32G X 4 MM miscellaneous     JARDIANCE 10 MG TABS tablet     lisinopril (ZESTRIL) 10 MG tablet     loratadine (CLARITIN) 10 MG tablet     metFORMIN (GLUCOPHAGE-XR) 500 MG 24 hr tablet     Multiple Vitamins-Minerals (MULTI COMPLETE PO)     naltrexone (DEPADE/REVIA) 50 MG tablet     simvastatin (ZOCOR) 20 MG tablet     tadalafil (CIALIS) 10 MG tablet     No current facility-administered medications for this visit.       Family History:  Family History   Problem Relation Age of Onset     Cancer Mother         Gynecologic     Thyroid Disease Mother      No Known Problems Father      Cancer Maternal Grandmother      Diabetes Paternal Grandmother      Mental Illness Paternal Grandmother      Colon Cancer Cousin         Approximately late 30s       Social History:  Social History     Tobacco Use     Smoking status: Never Smoker     Smokeless tobacco: Never Used   Substance Use Topics     Alcohol use: Yes     Comment: 2-3/month       ROS:  see HPI    Exam  No vitals, video visit  GEN: Healthy, alert and no distress  HEENT: EOMI, no proptosis or lid lag, no goiter  RESP: No audible wheeze, cough, or visible cyanosis  SKIN: Visible skin clear  NEURO: Cranial nerves grossly intact. Mentation and speech appropriate for age.  PSYCH: Mentation appears  normal, affect normal/bright, judgement and insight intact, normal speech and appearance well-groomed    Labs/Imaging    LDL Cholesterol Calculated   Date Value Ref Range Status   03/15/2019 53 <=129 mg/dL Final     No urine microalbumin    ENDO DIABETES Latest Ref Rng & Units 11/4/2021   GLUCOSE 70 - 99 mg/dL 269 (H)   A1C 0.0 - 5.6 % 8.3 (H)   A1C, POC 4.3 - 6.0 %    ALT 0 - 70 U/L 13   AST 0 - 45 U/L 10   CHOL <=199 mg/dL    LDL <=129 mg/dL    HDL >=40 mg/dL    TRIGLYCERIDES <=149 mg/dL    CREATININE 0.66 - 1.25 mg/dL 0.75   HCT 40.0 - 53.0 %    HGB 13.3 - 17.7 g/dL 15.1   MICROL mg/L    UMALCR 0 - 17 mg/g Cr    POTASSIUM 3.4 - 5.3 mmol/L 4.4     Assessment and Plan    Type 2 DM, suboptimal control, in context of Morbid Obesity BMI 37 calculated today based on subjective data provided by patient of height 70 inches tall, 260 lbs. He has lost meter > 2 weeks ago, has noncompliance w/ DM mgmt related to pandemic stress, work, family, chaos.     Since he had a fasting overnight low > 2 weeks ago, discussion focused on risk mitigation of hypoglycemia. He will check his sugars regularly and before bedtime and before driving. He knows how to manage hypoglycemia when it does occur and he gets sxs, so episodes are not silent. There is really a paucity of objective blood sugar data today.    He needs to see DM Education to discuss CGMS options. He is due for labs. He needs a new PMD. He seems motivated to take better care of himself.    Patient Instructions:    Nice to talk w/ you today in virtual clinic    Please establish primary care at [facility] with an Internal Medicine Physician. To do that, please call [phone] and ask for Internal Medicine MD Primary Care clinic appointment.    After an overnight fast - Please go to any [facility] lab. Follow standard safety precautions for COVID-19, practice social isolation, hand hygiene, do not touch your face, nose, or mouth, wear mask if have to go out in  public to be respectful of community.  Please contact us to schedule at any of our [facility] lab locations. Call [phone] ([phone]), select option 1.    We appreciate your assistance in coordinating your healthcare.      Please upload your insulin pump, blood sugar meter and/or continuous glucose monitor at home 1-2 days before your next diabetes-related appointment.   This will allow your provider to review your  data before your scheduled virtual visit.     To ask a question to your Endocrine care team, please send them a Shopcade message, or reach them by phone at [phone]      To expedite your medication refill(s), please contact your pharmacy and have them   fax a refill request to: [phone].  *Please allow 3 business days for routine medication refills.  *Please allow 5 business days for controlled substance medication refills.     For after-hours urgent Endocrine issues, that do not require 911, please dial [phone], and ask to speak with the Endocrinologist On-Call     YOUR TOP PRIORITIES:  1) SELF, 2) FAMILY, 3) JOB, 4) EVERYTHING ELSE     Food goal: 1,500-1,700 calorie food plan using meal replacements supplementing with fresh fruits and vegetables and food journal     Activity goal: Start walking program working up to 4 miles daily and home exercises using videos and home equipment.     Weight goal: weigh self 2x weekly and lose 1-2 lbs weekly     Medication goals: to continue wellbutrin, stay off naltexone due to nausea, continue Metformin #4 tabs daily, Humalog I:C ratio 3:1 and 1 per 25>140, reduce Lantus from 70 to 60 units at bedtime, restart Humalog correction 1 per 25>140.     Diabetes goals: to resume checking blood sugars. Check blood sugars 4x daily (when fasting in am and 2 hours after meals and at bedtime). Consider CGMS options.     RTC: Diabetes Education in 1-4 weeks, Diabetes Team PA in 1-3 months, me in 6 months    Please use Shopcade to schedule your  appointment(s) or call [phone] to schedule in [facility] or call [phone] to schedule in Endocrinology and Diabetes Clinic    Dr. Jennifer Gonzalez MD, MPH  Endocrinologist      Video-Visit Details    Type of service:  Video Visit    Originating Location (pt. Location): Other did not ask    Distant Location (provider location):  [facility] [place]     Platform used for Video Visit: ProcureSafe  Video: Start: 11/08/2021 11:57 am  Stop: 11/08/2021 12:39 pm      Total Time: 60 min spent on chart review including outside records, evaluation, management, counseling, education, & motivational interviewing with greater than 50% of the total time was spent on counseling and coordinating care and documentation on the day of encounter.       Answers for HPI/ROS submitted by the patient on 11/8/2021  General Symptoms: No  Skin Symptoms: No  HENT Symptoms: No  EYE SYMPTOMS: No  HEART SYMPTOMS: No  LUNG SYMPTOMS: No  INTESTINAL SYMPTOMS: No  URINARY SYMPTOMS: No  REPRODUCTIVE SYMPTOMS: No  SKELETAL SYMPTOMS: No  BLOOD SYMPTOMS: No  NERVOUS SYSTEM SYMPTOMS: No  MENTAL HEALTH SYMPTOMS: No    Outcome for 11/04/21 4:24 PM: Sent [employer] message asking for meter downloads to be sent to us prior to appointment.  Sanjana Ferguson   Virtual Visit Facilitator    Outcome for 11/05/21 12:33 PM: Left Voicemail    Outcome for 11/05/21 3:58 PM: Left Voicemail

## 2021-11-05 LAB
ALBUMIN SERPL-MCNC: 3.8 G/DL (ref 3.4–5)
ALP SERPL-CCNC: 92 U/L (ref 40–150)
ALT SERPL W P-5'-P-CCNC: 13 U/L (ref 0–70)
ANION GAP SERPL CALCULATED.3IONS-SCNC: 9 MMOL/L (ref 3–14)
AST SERPL W P-5'-P-CCNC: 10 U/L (ref 0–45)
BILIRUB SERPL-MCNC: 0.7 MG/DL (ref 0.2–1.3)
BUN SERPL-MCNC: 17 MG/DL (ref 7–30)
CALCIUM SERPL-MCNC: 9.1 MG/DL (ref 8.5–10.1)
CHLORIDE BLD-SCNC: 101 MMOL/L (ref 94–109)
CO2 SERPL-SCNC: 24 MMOL/L (ref 20–32)
CREAT SERPL-MCNC: 0.75 MG/DL (ref 0.66–1.25)
GFR SERPL CREATININE-BSD FRML MDRD: >90 ML/MIN/1.73M2
GLUCOSE BLD-MCNC: 269 MG/DL (ref 70–99)
POTASSIUM BLD-SCNC: 4.4 MMOL/L (ref 3.4–5.3)
PROT SERPL-MCNC: 7.6 G/DL (ref 6.8–8.8)
SODIUM SERPL-SCNC: 134 MMOL/L (ref 133–144)

## 2021-11-08 ENCOUNTER — VIRTUAL VISIT (OUTPATIENT)
Dept: ENDOCRINOLOGY | Facility: CLINIC | Age: 41
End: 2021-11-08
Payer: COMMERCIAL

## 2021-11-08 ENCOUNTER — TELEPHONE (OUTPATIENT)
Dept: ENDOCRINOLOGY | Facility: CLINIC | Age: 41
End: 2021-11-08

## 2021-11-08 DIAGNOSIS — E11.9 TYPE 2 DIABETES MELLITUS WITHOUT COMPLICATION, UNSPECIFIED WHETHER LONG TERM INSULIN USE (H): Primary | ICD-10-CM

## 2021-11-08 DIAGNOSIS — E66.01 MORBID OBESITY (H): ICD-10-CM

## 2021-11-08 DIAGNOSIS — E16.2 HYPOGLYCEMIA: ICD-10-CM

## 2021-11-08 PROCEDURE — 99215 OFFICE O/P EST HI 40 MIN: CPT | Mod: 95 | Performed by: INTERNAL MEDICINE

## 2021-11-08 NOTE — PATIENT INSTRUCTIONS
Nice to talk w/ you today in virtual clinic    Please establish primary care at [facility] with an Internal Medicine Physician. To do that, please call [phone] and ask for Internal Medicine MD Primary Care clinic appointment.    After an overnight fast - Please go to any [facility] lab. Follow standard safety precautions for COVID-19, practice social isolation, hand hygiene, do not touch your face, nose, or mouth, wear mask if have to go out in public to be respectful of community.  Please contact us to schedule at any of our [facility] lab locations. Call [phone] ([phone]), select option 1.    We appreciate your assistance in coordinating your healthcare.      Please upload your insulin pump, blood sugar meter and/or continuous glucose monitor at home 1-2 days before your next diabetes-related appointment.   This will allow your provider to review your  data before your scheduled virtual visit.     To ask a question to your Endocrine care team, please send them a Standardized Safety message, or reach them by phone at [phone]      To expedite your medication refill(s), please contact your pharmacy and have them   fax a refill request to: [phone].  *Please allow 3 business days for routine medication refills.  *Please allow 5 business days for controlled substance medication refills.     For after-hours urgent Endocrine issues, that do not require 911, please dial [phone], and ask to speak with the Endocrinologist On-Call     YOUR TOP PRIORITIES:  1) SELF, 2) FAMILY, 3) JOB, 4) EVERYTHING ELSE     Food goal: 1,500-1,700 calorie food plan using meal replacements supplementing with fresh fruits and vegetables and food journal     Activity goal: Start walking program working up to 4 miles daily and home exercises using videos and home equipment.     Weight goal: weigh self 2x weekly and lose 1-2 lbs weekly     Medication goals: to continue wellbutrin, stay off naltexone due  to nausea, continue Metformin #4 tabs daily, Humalog I:C ratio 3:1 and 1 per 25>140, reduce Lantus from 70 to 60 units at bedtime, restart Humalog correction 1 per 25>140.     Diabetes goals: to resume checking blood sugars. Check blood sugars 4x daily (when fasting in am and 2 hours after meals and at bedtime). Consider CGMS options.     RTC: Diabetes Education in 1-4 weeks, Diabetes Team PA in 1-3 months, me in 6 months    Please use Ruangguru to schedule your appointment(s) or call [phone] to schedule in [facility] or call [phone] to schedule in Endocrinology and Diabetes Clinic    Best Wishes,  Dr. Jennifer Interiano MD, MPH  Endocrinologist   IR drainage of right pleural effusion  send fluid  for culture  CXR in am

## 2021-11-08 NOTE — LETTER
11/8/2021       RE: Clarence Boyle  [address]  [facility] [address]     Dear Colleague,    Thank you for referring your patient, Clarence Boyle, to the [facility] [place] [facility]. Please see a copy of my visit note below.    Clarence is a 41 year old who is being evaluated via a billable video visit.      How would you like to obtain your AVS? MyChart  If the video visit is dropped, the invitation should be resent by: Text to cell phone: [phone]  Will anyone else be joining your video visit? No    Diabetes & Endocrinology Consult Follow up Note    Reason for visit/consult: Type 2 DM without known complications, due to labs, Obesity BMI 37    He needs new meter today     Primary care provider: needs one     Today, for his diabetes, he reports he is taking:  Metformin #4 tabs daily, Lantus 70 units at hs, Zocor 20 mg, Jardiance 10 mg, Lisinopril 10 mg, Humalog I:C ratio 3:1 or 2:1 depending on what he feels like doing, w/ meals not with snacks and was using approx 1 per 25>140.    (got sick on Victoza in past)     Is working on CHO counting    He is not: checking blood sugars at least daily    Blood sugars are: objective: none, subjective: none, data is >2 weeks old, had a low overnight >2 weeks ago & felt shaky sweaty, he did not report eating at bedtime and/or taking any short acting insulin then    He has not followed up with Diabetes Education since last visit w/ me 1 year ago. He lost his meter.    Lab Results   Component Value Date    A1C 8.3 11/04/2021     Patient Active Problem List     Diabetes mellitus type 2, uncomplicated (H)     HTN (hypertension)     Hyperlipidemia     H/O urethral stricture     ED (erectile dysfunction)     Dribbling urine     Past Medical/Surgical History:  Past Medical History:   Diagnosis Date     Diabetes (H)      Hypertension      Obese      Past Surgical History:   Procedure Laterality Date      URETHROPLASTY STAGE ONE WITH BUCCAL GRAFT N/A 5/2/2018    Procedure: URETHROPLASTY STAGE ONE WITH BUCCAL GRAFT;  Posterior Urethroplasty with Single Buccal Mucosa Graft;  Surgeon: Herb Awad MD;  [facility]       Allergies:  Allergies   Allergen Reactions     Liraglutide      Stomach upset, vomiting      Penicillins        Current Medications MEDICATIONS IN THIS CHART MAY NOT BE ACCURATE.    Current Outpatient Medications   Medication     blood glucose (NO BRAND SPECIFIED) test strip     blood glucose monitoring (MARYURI CONTOUR) test strip     blood glucose monitoring (NO BRAND SPECIFIED) meter device kit     buPROPion (WELLBUTRIN SR) 150 MG 12 hr tablet     CONTRAVE 8-90 MG per 12 hr tablet     Insulin Glargine (LANTUS SC)     insulin glargine (LANTUS SOLOSTAR) 100 UNIT/ML pen     Insulin Lispro (HUMALOG KWIKPEN) 200 UNIT/ML soln     insulin lispro (HUMALOG PEN) 100 UNIT/ML injection     insulin pen needle (B-D U/F) 31G X 8 MM     insulin pen needle (BD TONY U/F) 32G X 4 MM miscellaneous     JARDIANCE 10 MG TABS tablet     lisinopril (ZESTRIL) 10 MG tablet     loratadine (CLARITIN) 10 MG tablet     metFORMIN (GLUCOPHAGE-XR) 500 MG 24 hr tablet     Multiple Vitamins-Minerals (MULTI COMPLETE PO)     naltrexone (DEPADE/REVIA) 50 MG tablet     simvastatin (ZOCOR) 20 MG tablet     tadalafil (CIALIS) 10 MG tablet     No current facility-administered medications for this visit.       Family History:  Family History   Problem Relation Age of Onset     Cancer Mother         Gynecologic     Thyroid Disease Mother      No Known Problems Father      Cancer Maternal Grandmother      Diabetes Paternal Grandmother      Mental Illness Paternal Grandmother      Colon Cancer Cousin         Approximately late 30s       Social History:  Social History     Tobacco Use     Smoking status: Never Smoker     Smokeless tobacco: Never Used   Substance Use Topics     Alcohol use: Yes     Comment: 2-3/month       ROS:  see  HPI    Exam  No vitals, video visit  GEN: Healthy, alert and no distress  HEENT: EOMI, no proptosis or lid lag, no goiter  RESP: No audible wheeze, cough, or visible cyanosis  SKIN: Visible skin clear  NEURO: Cranial nerves grossly intact. Mentation and speech appropriate for age.  PSYCH: Mentation appears normal, affect normal/bright, judgement and insight intact, normal speech and appearance well-groomed    Labs/Imaging    LDL Cholesterol Calculated   Date Value Ref Range Status   03/15/2019 53 <=129 mg/dL Final     No urine microalbumin    ENDO DIABETES Latest Ref Rng & Units 11/4/2021   GLUCOSE 70 - 99 mg/dL 269 (H)   A1C 0.0 - 5.6 % 8.3 (H)   A1C, POC 4.3 - 6.0 %    ALT 0 - 70 U/L 13   AST 0 - 45 U/L 10   CHOL <=199 mg/dL    LDL <=129 mg/dL    HDL >=40 mg/dL    TRIGLYCERIDES <=149 mg/dL    CREATININE 0.66 - 1.25 mg/dL 0.75   HCT 40.0 - 53.0 %    HGB 13.3 - 17.7 g/dL 15.1   MICROL mg/L    UMALCR 0 - 17 mg/g Cr    POTASSIUM 3.4 - 5.3 mmol/L 4.4     Assessment and Plan    Type 2 DM, suboptimal control, in context of Morbid Obesity BMI 37 calculated today based on subjective data provided by patient of height 70 inches tall, 260 lbs. He has lost meter > 2 weeks ago, has noncompliance w/ DM mgmt related to pandemic stress, work, family, chaos.     Since he had a fasting overnight low > 2 weeks ago, discussion focused on risk mitigation of hypoglycemia. He will check his sugars regularly and before bedtime and before driving. He knows how to manage hypoglycemia when it does occur and he gets sxs, so episodes are not silent. There is really a paucity of objective blood sugar data today.    He needs to see DM Education to discuss CGMS options. He is due for labs. He needs a new PMD. He seems motivated to take better care of himself.    Patient Instructions:    Nice to talk w/ you today in virtual clinic    Please establish primary care at [facility] with an Internal Medicine Physician. To do that, please call  [phone] and ask for Internal Medicine MD Primary Care clinic appointment.    After an overnight fast - Please go to any [facility] lab. Follow standard safety precautions for COVID-19, practice social isolation, hand hygiene, do not touch your face, nose, or mouth, wear mask if have to go out in public to be respectful of community.  Please contact us to schedule at any of our [facility] lab locations. Call [phone] ([phone]), select option 1.    We appreciate your assistance in coordinating your healthcare.      Please upload your insulin pump, blood sugar meter and/or continuous glucose monitor at home 1-2 days before your next diabetes-related appointment.   This will allow your provider to review your  data before your scheduled virtual visit.     To ask a question to your Endocrine care team, please send them a YesVideo message, or reach them by phone at [phone]      To expedite your medication refill(s), please contact your pharmacy and have them   fax a refill request to: [phone].  *Please allow 3 business days for routine medication refills.  *Please allow 5 business days for controlled substance medication refills.     For after-hours urgent Endocrine issues, that do not require 911, please dial [phone], and ask to speak with the Endocrinologist On-Call     YOUR TOP PRIORITIES:  1) SELF, 2) FAMILY, 3) JOB, 4) EVERYTHING ELSE     Food goal: 1,500-1,700 calorie food plan using meal replacements supplementing with fresh fruits and vegetables and food journal     Activity goal: Start walking program working up to 4 miles daily and home exercises using videos and home equipment.     Weight goal: weigh self 2x weekly and lose 1-2 lbs weekly     Medication goals: to continue wellbutrin, stay off naltexone due to nausea, continue Metformin #4 tabs daily, Humalog I:C ratio 3:1 and 1 per 25>140, reduce Lantus from 70 to 60 units at bedtime, restart Humalog correction  1 per 25>140.     Diabetes goals: to resume checking blood sugars. Check blood sugars 4x daily (when fasting in am and 2 hours after meals and at bedtime). Consider CGMS options.     RTC: Diabetes Education in 1-4 weeks, Diabetes Team PA in 1-3 months, me in 6 months    Please use TheDigitel to schedule your appointment(s) or call [phone] to schedule in [facility] or call [phone] to schedule in Endocrinology and Diabetes Clinic    Best Wishes,  Dr. Jennifer Interiano MD, MPH  Endocrinologist      Video-Visit Details    Type of service:  Video Visit    Originating Location (pt. Location): Other did not ask    Distant Location (provider location):  [facility] [place]     Platform used for Video Visit: Ahonya  Video: Start: 11/08/2021 11:57 am  Stop: 11/08/2021 12:39 pm      Total Time: 60 min spent on chart review including outside records, evaluation, management, counseling, education, & motivational interviewing with greater than 50% of the total time was spent on counseling and coordinating care and documentation on the day of encounter.       Answers for HPI/ROS submitted by the patient on 11/8/2021  General Symptoms: No  Skin Symptoms: No  HENT Symptoms: No  EYE SYMPTOMS: No  HEART SYMPTOMS: No  LUNG SYMPTOMS: No  INTESTINAL SYMPTOMS: No  URINARY SYMPTOMS: No  REPRODUCTIVE SYMPTOMS: No  SKELETAL SYMPTOMS: No  BLOOD SYMPTOMS: No  NERVOUS SYSTEM SYMPTOMS: No  MENTAL HEALTH SYMPTOMS: No    Outcome for 11/04/21 4:24 PM: Sent Unicotrip message asking for meter downloads to be sent to us prior to appointment.  Sanjana Ferguson   Virtual Visit Facilitator    Outcome for 11/05/21 12:33 PM: Left Voicemail    Outcome for 11/05/21 3:58 PM: Left Voicemail

## 2021-11-08 NOTE — TELEPHONE ENCOUNTER
Diabetes Education Scheduling Outreach #1:    Call to patient to schedule. Left message with phone number to call to schedule.    Plan for 2nd outreach attempt within 1 week.    Angelica Hennessy  [place] [facility]  Diabetes and Nutrition Scheduling

## 2021-11-30 DIAGNOSIS — E66.01 MORBID OBESITY (H): ICD-10-CM

## 2021-11-30 DIAGNOSIS — E11.9 TYPE 2 DIABETES MELLITUS WITHOUT COMPLICATION, UNSPECIFIED WHETHER LONG TERM INSULIN USE (H): ICD-10-CM

## 2021-12-03 RX ORDER — LISINOPRIL 10 MG/1
TABLET ORAL
Qty: 90 TABLET | Refills: 1 | Status: SHIPPED | OUTPATIENT
Start: 2021-12-03 | End: 2022-06-29

## 2021-12-03 NOTE — TELEPHONE ENCOUNTER
LISINOPRIL 10MG TABLETS    Last Written Prescription Date:  5/24/21  Last Fill Quantity: 90,   # refills: 1  Last Office Visit : 11/8/21  Future Office visit: RTC: Diabetes Education in 1-4 weeks, Diabetes Team PA in 1-3 months, me in 6 months   BP reading due.labs done 11/4/21.

## 2022-02-02 ENCOUNTER — MYC MEDICAL ADVICE (OUTPATIENT)
Dept: ENDOCRINOLOGY | Facility: CLINIC | Age: 42
End: 2022-02-02
Payer: COMMERCIAL

## 2022-02-03 ENCOUNTER — TELEPHONE (OUTPATIENT)
Dept: ENDOCRINOLOGY | Facility: CLINIC | Age: 42
End: 2022-02-03

## 2022-02-03 NOTE — TELEPHONE ENCOUNTER
Prior Authorization Retail Medication Request    Medication/Dose: insulin glargine (LANTUS SOLOSTAR) 100 UNIT/ML pen  ICD code (if different than what is on RX):  Type 2 diabetes mellitus without complication, unspecified whether long term insulin use (H) [E11.9] - Morbid obesity (H) [E66.01]   Previously Tried and Failed:    Rationale:     Insurance Name:  Room[employer])  Insurance ID:  [id number]    Pharmacy Information (if different than what is on RX)  Name:  [id number] [place] [address] AT [facility] [address]  Phone:  [phone]

## 2022-02-07 NOTE — TELEPHONE ENCOUNTER
Central Prior Authorization Team   Phone: [phone]      PA Initiation    Medication: insulin glargine (LANTUS SOLOSTAR) 100 UNIT/ML pen-PA initiated  Insurance Company: [employer] - Phone [phone] Fax [phone]  Pharmacy Filling the Rx: [id number] [address] AT [facility] [address]  Filling Pharmacy Phone: [phone]  Filling Pharmacy Fax:    Start Date: 2/7/2022

## 2022-02-09 NOTE — TELEPHONE ENCOUNTER
PRIOR AUTHORIZATION DENIED    Medication: insulin glargine (LANTUS SOLOSTAR) 100 UNIT/ML pen-PA denied    Denial Date: 2/8/2022    Denial Rational: Covered alternatives arel Levemir, Toujeo, Tresiba        Appeal Information:

## 2022-02-23 ENCOUNTER — TELEPHONE (OUTPATIENT)
Dept: EDUCATION SERVICES | Facility: CLINIC | Age: 42
End: 2022-02-23

## 2022-02-23 ENCOUNTER — VIRTUAL VISIT (OUTPATIENT)
Dept: EDUCATION SERVICES | Facility: CLINIC | Age: 42
End: 2022-02-23
Attending: INTERNAL MEDICINE
Payer: COMMERCIAL

## 2022-02-23 DIAGNOSIS — E16.2 HYPOGLYCEMIA: ICD-10-CM

## 2022-02-23 DIAGNOSIS — E11.9 TYPE 2 DIABETES MELLITUS WITHOUT COMPLICATION, UNSPECIFIED WHETHER LONG TERM INSULIN USE (H): ICD-10-CM

## 2022-02-23 DIAGNOSIS — E66.01 MORBID OBESITY (H): ICD-10-CM

## 2022-02-23 PROCEDURE — G0108 DIAB MANAGE TRN  PER INDIV: HCPCS | Mod: 95

## 2022-02-23 RX ORDER — PROCHLORPERAZINE 25 MG/1
SUPPOSITORY RECTAL
Qty: 3 EACH | Refills: 3 | Status: SHIPPED | OUTPATIENT
Start: 2022-02-23 | End: 2022-06-23

## 2022-02-23 RX ORDER — PROCHLORPERAZINE 25 MG/1
SUPPOSITORY RECTAL
Qty: 1 EACH | Refills: 3 | Status: SHIPPED | OUTPATIENT
Start: 2022-02-23 | End: 2023-03-23

## 2022-02-23 ASSESSMENT — ENCOUNTER SYMPTOMS
HEARTBURN: 0
ARTHRALGIAS: 0
COUGH: 0
NERVOUS/ANXIOUS: 0
HEMATOCHEZIA: 0
JOINT SWELLING: 0
CONSTIPATION: 0
HEADACHES: 0
SORE THROAT: 0
WEAKNESS: 0
FEVER: 0
NAUSEA: 0
CHILLS: 0
PALPITATIONS: 0
SHORTNESS OF BREATH: 0
ABDOMINAL PAIN: 0
EYE PAIN: 0
PARESTHESIAS: 0
DYSURIA: 0
HEMATURIA: 0
MYALGIAS: 0
DIZZINESS: 0
FREQUENCY: 0
DIARRHEA: 0

## 2022-02-23 NOTE — PROGRESS NOTES
"Diabetes Self-Management Education & Support    Presents for: Individual review    Type of Visit: Telephone Visit  9a-10a    How would patient like to obtain AVS? Mani    ASSESSMENT:  Telephone visit with Clarence today, he has had diabetes for ~12 years.  Has not met with a diabetes educator since 2014.  Clarence lives at home with his wife and young kids.  He works outside the home.  Is wondering about a CGM today, has done some research and thinks this would be a helpful way for him to monitor his BG.  Currently he is not checking BG, but maybe once per day.  Is taking basal and mealtime insulin, estimating carbohydrate intake amounts and dosing insulin based on \"small\" meals versus \"large meals\".  When eating out, is not always aware of how many carbohydrates are in the food he's eating.  Takes 15-18 units for a large meal.  When eats somethng like spaghetti- takes 10 units.  Feels like he is always running high and unable to catch up.  Occasionally, tests in the morning and has had a fasting of 120 mg/dL.  Clarence does not think he ever exceeds 80 units per day of Humalog.  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  Claernce reports a low BG around 3am, 50-60 mg/dL reported last 3 weeks ago.  Had granola bar and then something else before BG started coming back up.            Patient's most recent   Lab Results   Component Value Date    A1C 8.3 11/04/2021    A1C 8.4 04/28/2020    is not meeting goal of <7.0    Diabetes knowledge and skills assessment:   Patient is knowledgeable in diabetes management concepts related to: Healthy Eating, Taking Medication    Continue education with the following diabetes management concepts: Healthy Eating, Being Active, Monitoring, Problem Solving, Reducing Risks and Healthy Coping    Based on learning assessment above, most appropriate setting for further diabetes education would be: Individual " setting.    INTERVENTIONS:    Education provided today on:  AADE Self-Care Behaviors:  Healthy Eating: label reading and tracking intake  Being Active: relationship to blood glucose  Monitoring: purpose, frequency of monitoring and CGM types and benefits  Taking Medication: action of prescribed medication and when to take medications  Problem Solving: low blood glucose - causes, signs/symptoms, treatment and prevention    Opportunities for ongoing education and support in diabetes-self management were discussed. Pt verbalized understanding of concepts discussed and recommendations provided today.       Education Materials Provided:  No new materials provided today        PLAN    1)  Orders for the Dexcom sensors and transmitter were sent to the [facility] mail order pharmacy.  Their number is [phone].      Information regarding Dexcom can be found on: www.[email or web address].com  Video with complete first time set-up information   https://[email or web address]/videos/search?q=dexcome+[email or web address]&qpvt=dexcome+[email or web address]+application&view=[email or web address]=VRDGAR&ru=%2Fvideos%2Fsearch%3Fq%3Ddexcome%2BG6%2Bapplication%26qpvt%3Ddexcome%2BG6%2Bapplication%26FORM%3DVDRE     2) Try tracking food intake and insulin amounts given.  This will give us a better idea of the whole picture once we have the continuous blood sugar information as well.  Use www.calorieking.com when ordering food/eating out.     Inject subcu 2 units per 10G CHO, correction scale of 1 units per 50 above 140. You can take 1/2 of this correction at bedtime. Approx 80 units daily.    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    If you have questions or concerns with getting set-up with the Dexcom, please reach out.    Topics to cover at upcoming visits: Healthy Eating, Being Active, Monitoring, Problem Solving, Reducing Risks and Healthy  "Coping  Follow-up: March 18, 9am- video appointment     See Goals Section for co-developed, patient-stated behavior change goals.  AVS provided to patient today.        SUBJECTIVE / OBJECTIVE:  Presents for: Individual review  Accompanied by: Self  Diabetes education in the past 24mo: No  Focus of Visit: CGM  Diabetes type: Type 2  Date of diagnosis: 12 years ago  Disease course: Worsening  Diabetes management related comments/concerns: monitoring BG  Cultural Influences/Ethnic Background:   /       Diabetes Symptoms & Complications:  Fatigue: No  Neuropathy: Sometimes  Polydipsia: Yes  Polyphagia: No  Polyuria: Yes  Visual change: No  Slow healing wounds: No  Symptom course: Worsening  Weight trend: Stable  Complications assessed today?: No    Patient Problem List and Family Medical History reviewed for relevant medical history, current medical status, and diabetes risk factors.    Vitals:  There were no vitals taken for this visit.  Estimated body mass index is 39.99 kg/m  as calculated from the following:    Height as of 9/23/19: 1.778 m (5' 10\").    Weight as of 9/23/19: 126.4 kg (278 lb 11.2 oz).   Last 3 BP:   BP Readings from Last 3 Encounters:   09/23/19 (!) 137/90   05/20/19 125/78   08/24/18 121/75       History   Smoking Status     Never Smoker   Smokeless Tobacco     Never Used       Labs:  Lab Results   Component Value Date    A1C 8.3 11/04/2021    A1C 8.4 04/28/2020     Lab Results   Component Value Date     11/04/2021     04/28/2020     Lab Results   Component Value Date    LDL 53 03/15/2019     Direct Measure HDL   Date Value Ref Range Status   03/15/2019 52 >=40 mg/dL Final   ]  GFR Estimate   Date Value Ref Range Status   11/04/2021 >90 >60 mL/min/1.73m2 Final     Comment:     As of July 11, 2021, eGFR is calculated by the CKD-EPI creatinine equation, without race adjustment. eGFR can be influenced by muscle mass, exercise, and diet. The reported eGFR is an estimation " only and is only applicable if the renal function is stable.   04/28/2020 >90 >60 mL/min/[1.73_m2] Final     Comment:     Non  GFR Calc  Starting 12/18/2018, serum creatinine based estimated GFR (eGFR) will be   calculated using the Chronic Kidney Disease Epidemiology Collaboration   (CKD-EPI) equation.       GFR Estimate If Black   Date Value Ref Range Status   04/28/2020 >90 >60 mL/min/[1.73_m2] Final     Comment:      GFR Calc  Starting 12/18/2018, serum creatinine based estimated GFR (eGFR) will be   calculated using the Chronic Kidney Disease Epidemiology Collaboration   (CKD-EPI) equation.       Lab Results   Component Value Date    CR 0.75 11/04/2021    CR 0.65 04/28/2020     No results found for: MICROALBUMIN    Healthy Eating:  Healthy Eating Assessed Today: Yes  Meal planning/habits: None  How many times a week on average do you eat food made away from home (restaurant/take-out)?: 4  Meals include: Breakfast, Lunch, Dinner  Breakfast: Breakfast burrito [employer] OR Granola bars  Lunch: Order food out-chinease, taco bell, bar and grill type food-flatbread  Dinner: at home- chicken, starch and veggie.  Has young kids, so eats pizza once per week, spaghetti  Other: avoids cereal and juice  Beverages: Diet soda, Water, Energy drinks (zero sugar monster, flavored water)    Being Active:  Being Active Assessed Today: Yes  Exercise:: Yes  Days per week of moderate to strenuous exercise (like a brisk walk): 2  How intense was your typical exercise? : Light (like stretching or slow walking)    Monitoring:  Monitoring Assessed Today: Yes  Did patient bring glucose meter to appointment? : No  Blood Glucose Meter: Unknown  Times checking blood sugar at home (number): 1  Times checking blood sugar at home (per): Day  Blood glucose trend: No change (Generally high)    Glucose data:  Is not testing regularly.  Reports BG is high most of the time.  Has had one reported low in the past 3  weeks overnight.        Taking Medications:  Diabetes Medication(s)     Biguanides       metFORMIN (GLUCOPHAGE-XR) 500 MG 24 hr tablet    Take 4 tablets (2,000 mg) by mouth daily (with dinner)    Insulin       Insulin Glargine (LANTUS SC)    Inject 70 Units Subcutaneous At Bedtime     Insulin Glargine-yfgn 100 UNIT/ML SOPN    Inject 70 Units Subcutaneous daily     Insulin Lispro (HUMALOG KWIKPEN) 200 UNIT/ML soln    Inject subcu 2 units per 10G CHO, correction scale of 1 units per 50 above 140. You can take 1/2 of this correction at bedtime. Approx 80 units daily. Lab draw needed. Call [facility] Ofelia Feliz to schedule.     insulin lispro (HUMALOG PEN) 100 UNIT/ML injection    200 Units     Sodium-Glucose Co-Transporter 2 (SGLT2) Inhibitors       JARDIANCE 10 MG TABS tablet    TAKE 1 TABLET(10 MG) BY MOUTH DAILY      Is taking semglee 62 units at night  Humalog, 1 unit per 10 grams of CHO  Sliding scale: 1 unit per 50 when above 140-   Jardiance  10 mg in the morning  Metformin 4 tabs with dinner    Taking Medication Assessed Today: Yes  Current Treatments: Insulin Injections, Oral Medication (taken by mouth)  Dose schedule: Pre-breakfast, Pre-lunch, Pre-dinner, At bedtime  Given by: Patient  Problems taking diabetes medications regularly?: No  Diabetes medication side effects?: Yes    Problem Solving:  Is the patient at risk for hypoglycemia?: Yes  Hypoglycemia Frequency: Rarely  Hypoglycemia Treatment: Other food (apple with peanut butter, glass of milk, eats granola bar)    Hypoglycemia symptoms  Dizziness or Light-Headedness: Yes  Nervousness/Anxiety: Yes (heart racing)  Feeling shaky: Yes       Reducing Risks:  Diabetes Risks: Sedentary Lifestyle    Healthy Coping:  Stage of change: CONTEMPLATION (Considering change and yet undecided)  Patient Activation Measure Survey Score:  No flowsheet data found.        Emelina Vences, STAN, LD  Outpatient Diabetes Education  Adult Diabetes Education Triage  [phone]    Time Spent: 60 minutes  Encounter Type: Individual        Any diabetes medication dose changes were made via the Certified Diabetes Care & Education Protocol in collaboration with the patient's referring provider. A copy of this encounter was shared with the provider.

## 2022-02-23 NOTE — LETTER
"    2/23/2022         RE: Clarence Boyle  [id number]  [facility] [address]        Dear Colleague,    Thank you for referring your patient, Clarence Boyle, to the [facility]ABET[facility] [place]. Please see a copy of my visit note below.    Diabetes Self-Management Education & Support    Presents for: Individual review    Type of Visit: Telephone Visit  9a-10a    How would patient like to obtain AVS? MyChart    ASSESSMENT:  Telephone visit with Clarence today, he has had diabetes for ~12 years.  Has not met with a diabetes educator since 2014.  Clarence lives at home with his wife and young kids.  He works outside the home.  Is wondering about a CGM today, has done some research and thinks this would be a helpful way for him to monitor his BG.  Currently he is not checking BG, but maybe once per day.  Is taking basal and mealtime insulin, estimating carbohydrate intake amounts and dosing insulin based on \"small\" meals versus \"large meals\".  When eating out, is not always aware of how many carbohydrates are in the food he's eating.  Takes 15-18 units for a large meal.  When eats somethng like spaghetti- takes 10 units.  Feels like he is always running high and unable to catch up.  Occasionally, tests in the morning and has had a fasting of 120 mg/dL.  Clarence does not think he ever exceeds 80 units per day of Humalog.  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  Clarence reports a low BG around 3am, 50-60 mg/dL reported last 3 weeks ago.  Had granola bar and then something else before BG started coming back up.            Patient's most recent   Lab Results   Component Value Date    A1C 8.3 11/04/2021    A1C 8.4 04/28/2020    is not meeting goal of <7.0    Diabetes knowledge and skills assessment:   Patient is knowledgeable in diabetes management concepts related to: Healthy Eating, Taking Medication    Continue education with the following " diabetes management concepts: Healthy Eating, Being Active, Monitoring, Problem Solving, Reducing Risks and Healthy Coping    Based on learning assessment above, most appropriate setting for further diabetes education would be: Individual setting.    INTERVENTIONS:    Education provided today on:  AADE Self-Care Behaviors:  Healthy Eating: label reading and tracking intake  Being Active: relationship to blood glucose  Monitoring: purpose, frequency of monitoring and CGM types and benefits  Taking Medication: action of prescribed medication and when to take medications  Problem Solving: low blood glucose - causes, signs/symptoms, treatment and prevention    Opportunities for ongoing education and support in diabetes-self management were discussed. Pt verbalized understanding of concepts discussed and recommendations provided today.       Education Materials Provided:  No new materials provided today        PLAN    1)  Orders for the Dexcom sensors and transmitter were sent to the [facility] mail order pharmacy.  Their number is [phone].      Information regarding Dexcom can be found on: www.[email or web address].com  Video with complete first time set-up information   https://[email or web address]/videos/search?q=dexcome+[email or web address]+application&qpvt=dexcome+G6+application&view=detail&xgl=[email or web address]&&FORM=VRDGAR&ru=%2Fvideos%2Fsearch%3Fq%3Ddexcome%2BG6%2Bapplication%26qpvt%3Ddexcome%2BG6%2Bapplication%26FORM%3DVDRE     2) Try tracking food intake and insulin amounts given.  This will give us a better idea of the whole picture once we have the continuous blood sugar information as well.  Use www.calorieking.com when ordering food/eating out.     Inject subcu 2 units per 10G CHO, correction scale of 1 units per 50 above 140. You can take 1/2 of this correction at bedtime. Approx 80 units daily.    3) Continue trying to increase activity- love the idea of being active with your family and coaching your kids basketball  "teams!    If you have questions or concerns with getting set-up with the Dexcom, please reach out.    Topics to cover at upcoming visits: Healthy Eating, Being Active, Monitoring, Problem Solving, Reducing Risks and Healthy Coping  Follow-up: March 18, 9am- video appointment     See Goals Section for co-developed, patient-stated behavior change goals.  AVS provided to patient today.        SUBJECTIVE / OBJECTIVE:  Presents for: Individual review  Accompanied by: Self  Diabetes education in the past 24mo: No  Focus of Visit: CGM  Diabetes type: Type 2  Date of diagnosis: 12 years ago  Disease course: Worsening  Diabetes management related comments/concerns: monitoring BG  Cultural Influences/Ethnic Background:   /       Diabetes Symptoms & Complications:  Fatigue: No  Neuropathy: Sometimes  Polydipsia: Yes  Polyphagia: No  Polyuria: Yes  Visual change: No  Slow healing wounds: No  Symptom course: Worsening  Weight trend: Stable  Complications assessed today?: No    Patient Problem List and Family Medical History reviewed for relevant medical history, current medical status, and diabetes risk factors.    Vitals:  There were no vitals taken for this visit.  Estimated body mass index is 39.99 kg/m  as calculated from the following:    Height as of 9/23/19: 1.778 m (5' 10\").    Weight as of 9/23/19: 126.4 kg (278 lb 11.2 oz).   Last 3 BP:   BP Readings from Last 3 Encounters:   09/23/19 (!) 137/90   05/20/19 125/78   08/24/18 121/75       History   Smoking Status     Never Smoker   Smokeless Tobacco     Never Used       Labs:  Lab Results   Component Value Date    A1C 8.3 11/04/2021    A1C 8.4 04/28/2020     Lab Results   Component Value Date     11/04/2021     04/28/2020     Lab Results   Component Value Date    LDL 53 03/15/2019     Direct Measure HDL   Date Value Ref Range Status   03/15/2019 52 >=40 mg/dL Final   ]  GFR Estimate   Date Value Ref Range Status   11/04/2021 >90 >60 " mL/min/1.73m2 Final     Comment:     As of July 11, 2021, eGFR is calculated by the CKD-EPI creatinine equation, without race adjustment. eGFR can be influenced by muscle mass, exercise, and diet. The reported eGFR is an estimation only and is only applicable if the renal function is stable.   04/28/2020 >90 >60 mL/min/[1.73_m2] Final     Comment:     Non  GFR Calc  Starting 12/18/2018, serum creatinine based estimated GFR (eGFR) will be   calculated using the Chronic Kidney Disease Epidemiology Collaboration   (CKD-EPI) equation.       GFR Estimate If Black   Date Value Ref Range Status   04/28/2020 >90 >60 mL/min/[1.73_m2] Final     Comment:      GFR Calc  Starting 12/18/2018, serum creatinine based estimated GFR (eGFR) will be   calculated using the Chronic Kidney Disease Epidemiology Collaboration   (CKD-EPI) equation.       Lab Results   Component Value Date    CR 0.75 11/04/2021    CR 0.65 04/28/2020     No results found for: MICROALBUMIN    Healthy Eating:  Healthy Eating Assessed Today: Yes  Meal planning/habits: None  How many times a week on average do you eat food made away from home (restaurant/take-out)?: 4  Meals include: Breakfast, Lunch, Dinner  Breakfast: Breakfast burrito McDonal[employer] OR Granola bars  Lunch: Order food out-chinease, taco bell, bar and grill type food-flatbread  Dinner: at home- chicken, starch and veggie.  Has young kids, so eats pizza once per week, spaghetti  Other: avoids cereal and juice  Beverages: Diet soda, Water, Energy drinks (zero sugar monster, flavored water)    Being Active:  Being Active Assessed Today: Yes  Exercise:: Yes  Days per week of moderate to strenuous exercise (like a brisk walk): 2  How intense was your typical exercise? : Light (like stretching or slow walking)    Monitoring:  Monitoring Assessed Today: Yes  Did patient bring glucose meter to appointment? : No  Blood Glucose Meter: Unknown  Times checking blood sugar at home  (number): 1  Times checking blood sugar at home (per): Day  Blood glucose trend: No change (Generally high)    Glucose data:  Is not testing regularly.  Reports BG is high most of the time.  Has had one reported low in the past 3 weeks overnight.        Taking Medications:  Diabetes Medication(s)     Biguanides       metFORMIN (GLUCOPHAGE-XR) 500 MG 24 hr tablet    Take 4 tablets (2,000 mg) by mouth daily (with dinner)    Insulin       Insulin Glargine (LANTUS SC)    Inject 70 Units Subcutaneous At Bedtime     Insulin Glargine-yfgn 100 UNIT/ML SOPN    Inject 70 Units Subcutaneous daily     Insulin Lispro (HUMALOG KWIKPEN) 200 UNIT/ML soln    Inject subcu 2 units per 10G CHO, correction scale of 1 units per 50 above 140. You can take 1/2 of this correction at bedtime. Approx 80 units daily. Lab draw needed. Call [email or web address] to schedule.     insulin lispro (HUMALOG PEN) 100 UNIT/ML injection    200 Units     Sodium-Glucose Co-Transporter 2 (SGLT2) Inhibitors       JARDIANCE 10 MG TABS tablet    TAKE 1 TABLET(10 MG) BY MOUTH DAILY      Is taking semglee 62 units at night  Humalog, 1 unit per 10 grams of CHO  Sliding scale: 1 unit per 50 when above 140-   Jardiance  10 mg in the morning  Metformin 4 tabs with dinner    Taking Medication Assessed Today: Yes  Current Treatments: Insulin Injections, Oral Medication (taken by mouth)  Dose schedule: Pre-breakfast, Pre-lunch, Pre-dinner, At bedtime  Given by: Patient  Problems taking diabetes medications regularly?: No  Diabetes medication side effects?: Yes    Problem Solving:  Is the patient at risk for hypoglycemia?: Yes  Hypoglycemia Frequency: Rarely  Hypoglycemia Treatment: Other food (apple with peanut butter, glass of milk, eats granola bar)    Hypoglycemia symptoms  Dizziness or Light-Headedness: Yes  Nervousness/Anxiety: Yes (heart racing)  Feeling shaky: Yes       Reducing Risks:  Diabetes Risks: Sedentary Lifestyle    Healthy Coping:  Stage of change:  CONTEMPLATION (Considering change and yet undecided)  Patient Activation Measure Survey Score:  No flowsheet data found.        Emelina Vences RDN, LD  Outpatient Diabetes Education  Adult Diabetes Education Triage [phone]    Time Spent: 60 minutes  Encounter Type: Individual        Any diabetes medication dose changes were made via the Certified Diabetes Care & Education Protocol in collaboration with the patient's referring provider. A copy of this encounter was shared with the provider.

## 2022-02-23 NOTE — PATIENT INSTRUCTIONS
1)  Orders for the Dexcom sensors and transmitter were sent to the [place] mail order pharmacy.  Their number is [phone].      Information regarding Dexcom can be found on: www.dexcom.com  Video with complete first time set-up information   https://[email or web address].com/videos/search?q=dexcome+[email or web address]+application&qpvt=dexcome+G6+application&view=[email or web address]&&FORM=VRDGAR&ru=%2Fvideos%2Fsearch%3Fq%3Ddexcome%2BG6%2Bapplication%26qpvt%3Ddexcome%2BG6%2Bapplication%26FORM%3DVDRE     2) Try tracking food intake and insulin amounts given.  This will give us a better idea of the whole picture once we have the continuous blood sugar information as well.  Use www.calorieking.com when ordering food/eating out.     Inject subcu 2 units per 10G CHO, correction scale of 1 units per 50 above 140. You can take 1/2 of this correction at bedtime. Approx 80 units daily.    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    If you have questions or concerns with getting set-up with the Dexcom, please reach out.

## 2022-03-02 ENCOUNTER — OFFICE VISIT (OUTPATIENT)
Dept: INTERNAL MEDICINE | Facility: CLINIC | Age: 42
End: 2022-03-02
Payer: COMMERCIAL

## 2022-03-02 VITALS
HEIGHT: 70 IN | OXYGEN SATURATION: 98 % | BODY MASS INDEX: 36.89 KG/M2 | DIASTOLIC BLOOD PRESSURE: 82 MMHG | HEART RATE: 107 BPM | RESPIRATION RATE: 18 BRPM | TEMPERATURE: 97.2 F | WEIGHT: 257.7 LBS | SYSTOLIC BLOOD PRESSURE: 118 MMHG

## 2022-03-02 DIAGNOSIS — E78.2 MODERATE MIXED HYPERLIPIDEMIA NOT REQUIRING STATIN THERAPY: ICD-10-CM

## 2022-03-02 DIAGNOSIS — Z11.3 SCREEN FOR STD (SEXUALLY TRANSMITTED DISEASE): ICD-10-CM

## 2022-03-02 DIAGNOSIS — I10 PRIMARY HYPERTENSION: ICD-10-CM

## 2022-03-02 DIAGNOSIS — Z00.00 ENCOUNTER FOR PREVENTATIVE ADULT HEALTH CARE EXAMINATION: Primary | ICD-10-CM

## 2022-03-02 DIAGNOSIS — E11.65 TYPE 2 DIABETES MELLITUS WITH HYPERGLYCEMIA, UNSPECIFIED WHETHER LONG TERM INSULIN USE (H): ICD-10-CM

## 2022-03-02 DIAGNOSIS — E66.01 MORBID OBESITY (H): ICD-10-CM

## 2022-03-02 PROBLEM — N39.43 DRIBBLING URINE: Status: ACTIVE | Noted: 2019-01-24

## 2022-03-02 PROBLEM — N52.9 ED (ERECTILE DYSFUNCTION): Status: ACTIVE | Noted: 2017-11-02

## 2022-03-02 LAB
ALBUMIN UR-MCNC: NEGATIVE MG/DL
APPEARANCE UR: CLEAR
BACTERIA #/AREA URNS HPF: ABNORMAL /HPF
BILIRUB UR QL STRIP: NEGATIVE
COLOR UR AUTO: YELLOW
CREAT UR-MCNC: 62 MG/DL
ERYTHROCYTE [DISTWIDTH] IN BLOOD BY AUTOMATED COUNT: 14.2 % (ref 10–15)
GLUCOSE UR STRIP-MCNC: >=1000 MG/DL
HBA1C MFR BLD: 9 % (ref 0–5.6)
HCT VFR BLD AUTO: 46.5 % (ref 40–53)
HCV AB SERPL QL IA: NONREACTIVE
HGB BLD-MCNC: 15.3 G/DL (ref 13.3–17.7)
HGB UR QL STRIP: NEGATIVE
HIV 1+2 AB+HIV1 P24 AG SERPL QL IA: NONREACTIVE
KETONES UR STRIP-MCNC: ABNORMAL MG/DL
LEUKOCYTE ESTERASE UR QL STRIP: NEGATIVE
MCH RBC QN AUTO: 29.3 PG (ref 26.5–33)
MCHC RBC AUTO-ENTMCNC: 32.9 G/DL (ref 31.5–36.5)
MCV RBC AUTO: 89 FL (ref 78–100)
MICROALBUMIN UR-MCNC: <5 MG/L
MICROALBUMIN/CREAT UR: NORMAL MG/G{CREAT}
NITRATE UR QL: NEGATIVE
PH UR STRIP: 5.5 [PH] (ref 5–7)
PLATELET # BLD AUTO: 223 10E3/UL (ref 150–450)
RBC # BLD AUTO: 5.23 10E6/UL (ref 4.4–5.9)
RBC #/AREA URNS AUTO: ABNORMAL /HPF
SP GR UR STRIP: 1.01 (ref 1–1.03)
UROBILINOGEN UR STRIP-ACNC: 0.2 E.U./DL
WBC # BLD AUTO: 7 10E3/UL (ref 4–11)
WBC #/AREA URNS AUTO: ABNORMAL /HPF

## 2022-03-02 PROCEDURE — 80061 LIPID PANEL: CPT | Performed by: INTERNAL MEDICINE

## 2022-03-02 PROCEDURE — 87491 CHLMYD TRACH DNA AMP PROBE: CPT | Performed by: INTERNAL MEDICINE

## 2022-03-02 PROCEDURE — 87591 N.GONORRHOEAE DNA AMP PROB: CPT | Performed by: INTERNAL MEDICINE

## 2022-03-02 PROCEDURE — 84443 ASSAY THYROID STIM HORMONE: CPT | Performed by: INTERNAL MEDICINE

## 2022-03-02 PROCEDURE — 99386 PREV VISIT NEW AGE 40-64: CPT | Performed by: INTERNAL MEDICINE

## 2022-03-02 PROCEDURE — 36415 COLL VENOUS BLD VENIPUNCTURE: CPT | Performed by: INTERNAL MEDICINE

## 2022-03-02 PROCEDURE — 80053 COMPREHEN METABOLIC PANEL: CPT | Performed by: INTERNAL MEDICINE

## 2022-03-02 PROCEDURE — 85027 COMPLETE CBC AUTOMATED: CPT | Performed by: INTERNAL MEDICINE

## 2022-03-02 PROCEDURE — 83036 HEMOGLOBIN GLYCOSYLATED A1C: CPT | Performed by: INTERNAL MEDICINE

## 2022-03-02 PROCEDURE — 82043 UR ALBUMIN QUANTITATIVE: CPT | Performed by: INTERNAL MEDICINE

## 2022-03-02 PROCEDURE — 86780 TREPONEMA PALLIDUM: CPT | Performed by: INTERNAL MEDICINE

## 2022-03-02 PROCEDURE — 86803 HEPATITIS C AB TEST: CPT | Performed by: INTERNAL MEDICINE

## 2022-03-02 PROCEDURE — 87389 HIV-1 AG W/HIV-1&-2 AB AG IA: CPT | Performed by: INTERNAL MEDICINE

## 2022-03-02 PROCEDURE — 81001 URINALYSIS AUTO W/SCOPE: CPT | Performed by: INTERNAL MEDICINE

## 2022-03-02 NOTE — PROGRESS NOTES
3  SUBJECTIVE:   CC: Clarence Boyle is an 41 year old male who presents for preventive health visit.       Patient has been advised of split billing requirements and indicates understanding: Yes     Answers for HPI/ROS submitted by the patient on 2/23/2022  Frequency of exercise:: 1 day/week  Getting at least 3 servings of Calcium per day:: Yes  Diet:: Diabetic  Taking medications regularly:: Yes  Medication side effects:: None  Bi-annual eye exam:: Yes  Dental care twice a year:: Yes  Sleep apnea or symptoms of sleep apnea:: None  abdominal pain: No  Blood in stool: No  Blood in urine: No  chest pain: No  chills: No  congestion: No  constipation: No  cough: No  diarrhea: No  dizziness: No  ear pain: No  eye pain: No  nervous/anxious: No  fever: No  frequency: No  genital sores: Yes  headaches: No  hearing loss: No  heartburn: No  arthralgias: No  joint swelling: No  peripheral edema: No  mood changes: No  myalgias: No  nausea: No  dysuria: No  palpitations: No  Skin sensation changes: No  sore throat: No  urgency: No  rash: No  shortness of breath: No  visual disturbance: No  weakness: No  impotence: Yes  penile discharge: No  Additional concerns today:: No  Duration of exercise:: Less than 15 minutes      Healthy Habits:    Do you get at least three servings of calcium containing foods daily (dairy, green leafy vegetables, etc.)? yes    Amount of exercise or daily activities, outside of work: no    Problems taking medications regularly No    Medication side effects: No    Have you had an eye exam in the past two years? yes    Do you see a dentist twice per year? yes    Do you have sleep apnea, excessive snoring or daytime drowsiness?no      Diabetes Follow-up  Has H/O DM. On diet , exercise , insulin and oral medications. Blood sugars are controlled. No parestesias. No hypoglycemias.    How often are you checking your blood sugar? Continuous glucose monitor  What time of day are you checking your blood sugars (select  all that apply)?  Before and after meals  Have you had any blood sugars above 200?  Yes   Have you had any blood sugars below 70?  No    What symptoms do you notice when your blood sugar is low?  None    What concerns do you have today about your diabetes? None     Do you have any of these symptoms? (Select all that apply)  No numbness or tingling in feet.  No redness, sores or blisters on feet.  No complaints of excessive thirst.  No reports of blurry vision.  No significant changes to weight.    Have you had a diabetic eye exam in the last 12 months? No          Hyperlipidemia Follow-Up  Has H/O hyperlipidemia. On medical treatment and diet. No side effects. No muscle weakness, myalgias or upset stomach.       Are you regularly taking any medication or supplement to lower your cholesterol?   Yes- simvastatin    Are you having muscle aches or other side effects that you think could be caused by your cholesterol lowering medication?  No    Hypertension Follow-up  Has h/o HTN. on medical treatment. BP has been controlled. No side effects from medications. No CP, HA, dizziness. good compliance with medications and low salt diet.      Do you check your blood pressure regularly outside of the clinic? No     Are you following a low salt diet? No    Are your blood pressures ever more than 140 on the top number (systolic) OR more   than 90 on the bottom number (diastolic), for example 140/90? Yes    BP Readings from Last 2 Encounters:   03/02/22 118/82   09/23/19 (!) 137/90     Hemoglobin A1C POCT (%)   Date Value   04/28/2020 8.4 (H)     Hemoglobin A1C (%)   Date Value   11/04/2021 8.3 (H)   01/10/2019 7.7 (H)     LDL Cholesterol Calculated (mg/dL)   Date Value   03/15/2019 53   02/05/2018 58         Today's PHQ-2 Score:   PHQ-2 ( 1999 [employer]) 2/23/2022 4/29/2020   Q1: Little interest or pleasure in doing things 0 0   Q2: Feeling down, depressed or hopeless 0 1   PHQ-2 Score 0 1   PHQ-2 Total Score (12-17 Years)- Positive  if 3 or more points; Administer PHQ-A if positive - 1   Q1: Little interest or pleasure in doing things Not at all Not at all   Q2: Feeling down, depressed or hopeless Not at all Several days   PHQ-2 Score 0 1       Abuse: Current or Past(Physical, Sexual or Emotional)- No  Do you feel safe in your environment? Yes    Have you ever done Advance Care Planning? (For example, a Health Directive, POLST, or a discussion with a medical provider or your loved ones about your wishes): No, advance care planning information given to patient to review.  Patient declined advance care planning discussion at this time.    Social History     Tobacco Use     Smoking status: Never Smoker     Smokeless tobacco: Never Used   Substance Use Topics     Alcohol use: Yes     Comment: 2-3/month     If you drink alcohol do you typically have >3 drinks per day or >7 drinks per week? No                      Last PSA: No results found for: PSA    Reviewed orders with patient. Reviewed health maintenance and updated orders accordingly - Yes  Lab work is in process  Labs reviewed in EPIC    Reviewed and updated as needed this visit by clinical staff   Tobacco  Allergies  Meds  Problems  Med Hx  Surg Hx  Fam Hx  Soc   Hx        Reviewed and updated as needed this visit by Provider   Tobacco  Allergies  Meds  Problems  Med Hx  Surg Hx  Fam Hx             ROS:  CONSTITUTIONAL: NEGATIVE for fever, chills, change in weight  INTEGUMENTARY/SKIN: NEGATIVE for worrisome rashes, moles or lesions  EYES: NEGATIVE for vision changes or irritation  ENT: NEGATIVE for ear, mouth and throat problems  RESP: NEGATIVE for significant cough or SOB  CV: NEGATIVE for chest pain, palpitations or peripheral edema  GI: NEGATIVE for nausea, abdominal pain, heartburn, or change in bowel habits   male: negative for dysuria, hematuria, decreased urinary stream, erectile dysfunction, urethral discharge  MUSCULOSKELETAL: NEGATIVE for significant arthralgias or  "myalgia  NEURO: NEGATIVE for weakness, dizziness or paresthesias  PSYCHIATRIC: NEGATIVE for changes in mood or affect    OBJECTIVE:   /82   Pulse 107   Temp 97.2  F (36.2  C) (Tympanic)   Resp 18   Ht 1.778 m (5' 10\")   Wt 116.9 kg (257 lb 11.2 oz)   SpO2 98%   BMI 36.98 kg/m    EXAM:  GENERAL:obese, alert and no distress  EYES: Eyes grossly normal to inspection, PERRL and conjunctivae and sclerae normal  HENT: ear canals and TM's normal, nose and mouth without ulcers or lesions  NECK: no adenopathy, no asymmetry, masses, or scars and thyroid normal to palpation  RESP: lungs clear to auscultation - no rales, rhonchi or wheezes  CV: regular rate and rhythm, normal S1 S2, no S3 or S4, no murmur, click or rub, no peripheral edema and peripheral pulses strong  ABDOMEN: soft, nontender, no hepatosplenomegaly, no masses and bowel sounds normal  MS: no gross musculoskeletal defects noted, no edema  SKIN: no suspicious lesions or rashes  NEURO: Normal strength and tone, mentation intact and speech normal  PSYCH: mentation appears normal, affect normal/bright  Diabetic foot exam: normal DP and PT pulses, no trophic changes or ulcerative lesions and normal sensory exam  Skin xerosis on the bottom of the feet     Diagnostic Test Results:  Labs reviewed in Epic    ASSESSMENT/PLAN:       ICD-10-CM    1. Encounter for preventative adult health care examination  Z00.00 Lipid panel reflex to direct LDL Fasting     CBC with platelets     Comprehensive metabolic panel (BMP + Alb, Alk Phos, ALT, AST, Total. Bili, TP)     Hemoglobin A1c     Albumin Random Urine Quantitative with Creat Ratio     UA with Microscopic reflex to Culture - lab collect     Hepatitis C antibody     TSH with free T4 reflex   2. Primary hypertension  I10    3. Type 2 diabetes mellitus with hyperglycemia, unspecified whether long term insulin use (H)  E11.65    4. Moderate mixed hyperlipidemia not requiring statin therapy  E78.2    5. Screen for STD " "(sexually transmitted disease)  Z11.3 HIV Antigen Antibody Combo     Treponema Abs w Reflex to RPR and Titer     NEISSERIA GONORRHOEA PCR     CHLAMYDIA TRACHOMATIS PCR   6. Morbid obesity (H)  E66.01        Patient has been advised of split billing requirements and indicates understanding: Yes  COUNSELING:  Reviewed preventive health counseling, as reflected in patient instructions       Regular exercise       Healthy diet/nutrition       Vision screening       Hearing screening       Safe sex practices/STD prevention    Estimated body mass index is 36.98 kg/m  as calculated from the following:    Height as of this encounter: 1.778 m (5' 10\").    Weight as of this encounter: 116.9 kg (257 lb 11.2 oz).    Weight management plan: Discussed healthy diet and exercise guidelines    He reports that he has never smoked. He has never used smokeless tobacco.      Counseling Resources:  ATP IV Guidelines  Pooled Cohorts Equation Calculator  FRAX Risk Assessment  ICSI Preventive Guidelines  Dietary Guidelines for Americans, 2010  USDA's MyPlate  ASA Prophylaxis  Lung CA Screening    Damien Contreras MD  [facility]  "

## 2022-03-02 NOTE — LETTER
March 4, 2022      Clarence Boyle  [id number]  [place] [address]          Dear Clarence,      Not well controlled diabetes. Try to improve diet and exercise. Follow up with endocrinology.   Rest of the results are normal.       Sincerely,      Damien Contreras MD      Results for orders placed or performed in visit on 03/02/22   CBC with platelets     Status: Normal   Result Value Ref Range    WBC Count 7.0 4.0 - 11.0 10e3/uL    RBC Count 5.23 4.40 - 5.90 10e6/uL    Hemoglobin 15.3 13.3 - 17.7 g/dL    Hematocrit 46.5 40.0 - 53.0 %    MCV 89 78 - 100 fL    MCH 29.3 26.5 - 33.0 pg    MCHC 32.9 31.5 - 36.5 g/dL    RDW 14.2 10.0 - 15.0 %    Platelet Count 223 150 - 450 10e3/uL   Hemoglobin A1c     Status: Abnormal   Result Value Ref Range    Hemoglobin A1C 9.0 (H) 0.0 - 5.6 %   Albumin Random Urine Quantitative with Creat Ratio     Status: None   Result Value Ref Range    Creatinine Urine mg/dL 62 mg/dL    Albumin Urine mg/L <5 mg/L    Albumin Urine mg/g Cr     UA with Microscopic reflex to Culture - lab collect     Status: Abnormal    Specimen: Urine, Midstream   Result Value Ref Range    Color Urine Yellow Colorless, Straw, Light Yellow, Yellow    Appearance Urine Clear Clear    Glucose Urine >=1000 (A) Negative mg/dL    Bilirubin Urine Negative Negative    Ketones Urine Trace (A) Negative mg/dL    Specific Gravity Urine 1.015 1.003 - 1.035    Blood Urine Negative Negative    pH Urine 5.5 5.0 - 7.0    Protein Albumin Urine Negative Negative mg/dL    Urobilinogen Urine 0.2 0.2, 1.0 E.U./dL    Nitrite Urine Negative Negative    Leukocyte Esterase Urine Negative Negative   Hepatitis C antibody     Status: Normal   Result Value Ref Range    Hepatitis C Antibody Nonreactive Nonreactive    Narrative    Assay performance characteristics have not been established for newborns, infants, and children.   HIV Antigen Antibody Combo     Status: Normal   Result Value Ref Range    HIV Antigen Antibody Combo Nonreactive Nonreactive    Treponema Abs w Reflex to RPR and Titer     Status: Normal   Result Value Ref Range    Treponema Antibody Total Nonreactive Nonreactive   Urine Microscopic     Status: Abnormal   Result Value Ref Range    Bacteria Urine Few (A) None Seen /HPF    RBC Urine None Seen 0-2 /HPF /HPF    WBC Urine 0-5 0-5 /HPF /HPF    Narrative    Urine Culture not indicated   NEISSERIA GONORRHOEA PCR     Status: Normal    Specimen: Urine, Voided   Result Value Ref Range    Neisseria gonorrhoeae Negative Negative   CHLAMYDIA TRACHOMATIS PCR     Status: Normal    Specimen: Urine, Voided   Result Value Ref Range    Chlamydia trachomatis Negative Negative

## 2022-03-03 DIAGNOSIS — E11.9 TYPE 2 DIABETES MELLITUS WITHOUT COMPLICATION, UNSPECIFIED WHETHER LONG TERM INSULIN USE (H): ICD-10-CM

## 2022-03-03 DIAGNOSIS — E66.01 MORBID OBESITY (H): ICD-10-CM

## 2022-03-03 LAB
C TRACH DNA SPEC QL NAA+PROBE: NEGATIVE
N GONORRHOEA DNA SPEC QL NAA+PROBE: NEGATIVE
T PALLIDUM AB SER QL: NONREACTIVE

## 2022-03-06 LAB
ALBUMIN SERPL-MCNC: 3.8 G/DL (ref 3.4–5)
ALP SERPL-CCNC: 85 U/L (ref 40–150)
ALT SERPL W P-5'-P-CCNC: 8 U/L (ref 0–70)
ANION GAP SERPL CALCULATED.3IONS-SCNC: 7 MMOL/L (ref 3–14)
AST SERPL W P-5'-P-CCNC: 15 U/L (ref 0–45)
BILIRUB SERPL-MCNC: 0.5 MG/DL (ref 0.2–1.3)
BUN SERPL-MCNC: 17 MG/DL (ref 7–30)
CALCIUM SERPL-MCNC: 9.3 MG/DL (ref 8.5–10.1)
CHLORIDE BLD-SCNC: 104 MMOL/L (ref 94–109)
CHOLEST SERPL-MCNC: 142 MG/DL
CO2 SERPL-SCNC: 25 MMOL/L (ref 20–32)
CREAT SERPL-MCNC: 0.8 MG/DL (ref 0.66–1.25)
FASTING STATUS PATIENT QL REPORTED: YES
GFR SERPL CREATININE-BSD FRML MDRD: >90 ML/MIN/1.73M2
GLUCOSE BLD-MCNC: 174 MG/DL (ref 70–99)
HDLC SERPL-MCNC: 58 MG/DL
LDLC SERPL CALC-MCNC: 60 MG/DL
NONHDLC SERPL-MCNC: 84 MG/DL
POTASSIUM BLD-SCNC: 4.6 MMOL/L (ref 3.4–5.3)
PROT SERPL-MCNC: 7.1 G/DL (ref 6.8–8.8)
SODIUM SERPL-SCNC: 136 MMOL/L (ref 133–144)
TRIGL SERPL-MCNC: 120 MG/DL
TSH SERPL DL<=0.005 MIU/L-ACNC: 2.2 MU/L (ref 0.4–4)

## 2022-03-06 RX ORDER — METFORMIN HCL 500 MG
2000 TABLET, EXTENDED RELEASE 24 HR ORAL
Qty: 360 TABLET | Refills: 0 | Status: SHIPPED | OUTPATIENT
Start: 2022-03-06 | End: 2022-06-17

## 2022-03-18 ENCOUNTER — VIRTUAL VISIT (OUTPATIENT)
Dept: EDUCATION SERVICES | Facility: CLINIC | Age: 42
End: 2022-03-18
Payer: COMMERCIAL

## 2022-03-18 PROCEDURE — G0108 DIAB MANAGE TRN  PER INDIV: HCPCS | Mod: 95

## 2022-03-18 NOTE — PATIENT INSTRUCTIONS
1) Try combining a source of carbohydrates and a source of protein with bedtime snack to see if that helps night time blood sugar.   1-2 carbohydrate servings (15-30 grams) AND a serving of protein- could be 1/4 C nuts with a banana, 2 T peanut butter/other nut butter on whole grain toast, 1/2 C cottage cheese with 1/2 C fruit, cheese with some whole grain crackers (typically 6-8 is a serving)    2) Goal is to reduce the occurrence of night time low blood sugars.  Try by decreasing glargine by 2 units (62 units) if still experiencing lows after 5-7 days, could decrease 2 more units to 60.    3) Continue with activity routine    4) When experiencing low BG, eat 15-20 grams of carbohydrates and wait 15 minutes.  If continues to be low try another 15-20 grams.  Once BG is steadily rising, try to eat a large snack or a meal within 45 minutes (including carbohydrates and protein).       To schedule another appointment: [phone]

## 2022-03-18 NOTE — PROGRESS NOTES
Diabetes Self-Management Education & Support    Presents for: Follow-up    Type of Visit: Video Visit: 9:02a-10:06 am    How would patient like to obtain AVS? Mani    ASSESSMENT:    Met with Clarence today to discuss how use of Dexcom, CGM is going.  Was unable to download his data via clarity marisol, but he was able to send me some data.  Is going very well staying on track with changes.  Is monitoring intake more closely, counting carbohydrates and dosing insulin more appropriately.  Has also increased physical activity level with the help of an apple watch.  Activity has increased to more walking and is now using the eliValkee 3-4 times per week for 30-40 minutes.  He reports that having the CGM has been helpful to understand what his BG is doing.  He is experiencing some low BG.  Is finding some correlation between exercise and low BG.  He has started using calorie martha especially when eating out which has been helpful for carbohydrate counting and portion control.  Has stopped eating fast food at lunch time.  Occasionally will eat out on the weekends.    Clarence estimates that his BG might be going low twice per week and usually during the night which has kept him up sometimes during the night.  Has struggled getting it to come back up once and had to drink some soda.  We reviewed what he is eating for bedtime snack; is having some girl  cookies, fruit or ice cream for bedtime snack.     It seems Clarence's carbohydrate counting and meal time insulin dosing are more accurate than before using tools such as CGM, calorie martha and his dietary adjustments.  Clarence thinks his total daily dose of meal time insulin is less than 50 units/day.  Due to the increase in activity and the low BG occurring during the night, I think Clarence would benefit from lowering the dose of basal insulin as a start.  Recommend an initial 2 unit decrease and an additional 2 unit decrease if he continues to see lows.  Recent A1C was 9%, so would like  to avoid making large adjustments until it is closer to target range while also decreasing occurences of low BG.    Patient's most recent   Lab Results   Component Value Date    A1C 9.0 03/02/2022    A1C 8.4 04/28/2020    is not meeting goal of <7.0    Diabetes knowledge and skills assessment:   Patient is knowledgeable in diabetes management concepts related to: Healthy Eating, Being Active, Monitoring and Taking Medication    Continue education with the following diabetes management concepts: Healthy Eating, Taking Medication, Problem Solving, Reducing Risks and Healthy Coping    Based on learning assessment above, most appropriate setting for further diabetes education would be: Individual setting.    INTERVENTIONS:    Education provided today on:  AADE Self-Care Behaviors:  Healthy Eating: carbohydrate counting and weight reduction  Monitoring: log and interpret results  Taking Medication: action of prescribed medication and when to take medications  Problem Solving: low blood glucose - causes, signs/symptoms, treatment and prevention and carrying a carbohydrate source at all times    Opportunities for ongoing education and support in diabetes-self management were discussed. Pt verbalized understanding of concepts discussed and recommendations provided today.       Education Materials Provided:  No new materials today      PLAN    1) Try combining a source of carbohydrates and a source of protein with bedtime snack to see if that helps night time blood sugar.   1-2 carbohydrate servings (15-30 grams) AND a serving of protein- could be 1/4 C nuts with a banana, 2 T peanut butter/other nut butter on whole grain toast, 1/2 C cottage cheese with 1/2 C fruit, cheese with some whole grain crackers (typically 6-8 is a serving)    2) Goal is to reduce the occurrence of night time low blood sugars.  Try by decreasing glargine by 2 units (62 units) if still experiencing lows after 5-7 days, could decrease 2 more units to  "60.    3) Continue with activity routine    4) When experiencing low BG, eat 15-20 grams of carbohydrates and wait 15 minutes.  If continues to be low try another 15-20 grams.  Once BG is steadily rising, try to eat a large snack or a meal within 45 minutes (including carbohydrates and protein).     Topics to cover at upcoming visits: Monitoring, Taking Medication, Reducing Risks and Healthy Coping  Follow-up: Pt would like to schedule on his own.    See Goals Section for co-developed, patient-stated behavior change goals.  AVS provided to patient today.      SUBJECTIVE / OBJECTIVE:  Presents for: Follow-up  Accompanied by: Self  Diabetes education in the past 24mo: Yes  Focus of Visit: CGM, Monitoring  Diabetes type: Type 2  Date of diagnosis: 12 years ago  Disease course: Improving  Diabetes management related comments/concerns: monitoring BG  Cultural Influences/Ethnic Background:   /       Diabetes Symptoms & Complications:  Fatigue: No  Neuropathy: Sometimes  Polydipsia: Yes  Polyphagia: No  Polyuria: Yes  Visual change: No  Slow healing wounds: No  Symptom course: Worsening  Weight trend: Stable  Complications assessed today?: No    Patient Problem List and Family Medical History reviewed for relevant medical history, current medical status, and diabetes risk factors.    Vitals:  There were no vitals taken for this visit.  Estimated body mass index is 36.98 kg/m  as calculated from the following:    Height as of 3/2/22: 1.778 m (5' 10\").    Weight as of 3/2/22: 116.9 kg (257 lb 11.2 oz).   Last 3 BP:   BP Readings from Last 3 Encounters:   03/02/22 118/82   09/23/19 (!) 137/90   05/20/19 125/78       History   Smoking Status     Never Smoker   Smokeless Tobacco     Never Used       Labs:  Lab Results   Component Value Date    A1C 9.0 03/02/2022    A1C 8.4 04/28/2020     Lab Results   Component Value Date     03/02/2022     04/28/2020     Lab Results   Component Value Date    LDL 60 " 03/02/2022     Direct Measure HDL   Date Value Ref Range Status   03/02/2022 58 >=40 mg/dL Final   ]  GFR Estimate   Date Value Ref Range Status   03/02/2022 >90 >60 mL/min/1.73m2 Final     Comment:     Effective December 21, 2021 eGFRcr in adults is calculated using the 2021 CKD-EPI creatinine equation which includes age and gender (Kun et al., [place], DOI: 10.1056/EWFWgf5268953)   04/28/2020 >90 >60 mL/min/[1.73_m2] Final     Comment:     Non  GFR Calc  Starting 12/18/2018, serum creatinine based estimated GFR (eGFR) will be   calculated using the Chronic Kidney Disease Epidemiology Collaboration   (CKD-EPI) equation.       GFR Estimate If Black   Date Value Ref Range Status   04/28/2020 >90 >60 mL/min/[1.73_m2] Final     Comment:      GFR Calc  Starting 12/18/2018, serum creatinine based estimated GFR (eGFR) will be   calculated using the Chronic Kidney Disease Epidemiology Collaboration   (CKD-EPI) equation.       Lab Results   Component Value Date    CR 0.80 03/02/2022    CR 0.65 04/28/2020     No results found for: MICROALBUMIN    Healthy Eating:  Healthy Eating Assessed Today: Yes  Meal planning/habits: None, Carb counting  How many times a week on average do you eat food made away from home (restaurant/take-out)?: 4  Meals include: Breakfast, Lunch, Dinner  Breakfast: Breakfast burrito [employer] OR Granola bars  Lunch: flatbread, frozen meals, yogurt that he can count CHO content  Dinner: at home- chicken, starch and veggie.  Has young kids, so eats pizza once per week, spaghetti  Snacks: granola bars, cheese and crackers  Other: avoids cereal and juice  Beverages: Diet soda, Water, Energy drinks (zero sugar monster, flavored water)  Has patient met with a dietitian in the past?: Yes    Being Active:  Being Active Assessed Today: Yes  Exercise:: Yes  Days per week of moderate to strenuous exercise (like a brisk walk): 4  On average, minutes per day of exercise at this level:  30  How intense was your typical exercise? : Moderate (like brisk walking)  Exercise Minutes per Week: 120    Monitoring:  Monitoring Assessed Today: Yes  Did patient bring glucose meter to appointment? : No  Blood Glucose Meter: CGM  Times checking blood sugar at home (number): 1  Times checking blood sugar at home (per): Day  Blood glucose trend: No change (Generally high)    Glucose data:  Unable to connect to Dexcom through marisol.  Pt was able to send some of his data.  Per pt report- the last two weeks  Avg  mg/dL  80% in range                         Taking Medications:  Diabetes Medication(s)     Biguanides       metFORMIN (GLUCOPHAGE-XR) 500 MG 24 hr tablet    Take 4 tablets (2,000 mg) by mouth daily (with dinner) For additional refills, please schedule a follow-up appointment.    Insulin       Insulin Glargine-yfgn 100 UNIT/ML SOPN    Inject 70 Units Subcutaneous daily     Insulin Lispro (HUMALOG KWIKPEN) 200 UNIT/ML soln    Inject subcu 2 units per 10G CHO, correction scale of 1 units per 50 above 140. You can take 1/2 of this correction at bedtime. Approx 80 units daily. Lab draw needed. Call [facility] [facility] to schedule.    Sodium-Glucose Co-Transporter 2 (SGLT2) Inhibitors       JARDIANCE 10 MG TABS tablet    TAKE 1 TABLET(10 MG) BY MOUTH DAILY      Of note- is taking 64 units of Glargine in the morning  Jardiance also taken in the morning    Taking Medication Assessed Today: Yes  Current Treatments: Insulin Injections, Oral Medication (taken by mouth)  Dose schedule: Pre-breakfast, Pre-lunch, Pre-dinner, At bedtime  Given by: Patient  Problems taking diabetes medications regularly?: No  Diabetes medication side effects?: Yes    Problem Solving:  Problem Solving Assessed Today: Yes  Is the patient at risk for hypoglycemia?: Yes  Hypoglycemia Frequency: Weekly  Hypoglycemia Treatment: Other food (apple with peanut butter, glass of milk, eats granola bar)    Hypoglycemia symptoms  Confusion: No  Dizziness  or Light-Headedness: Yes  Headaches: No  Hunger: Yes  Mood changes: Yes  Nervousness/Anxiety: Yes (heart racing)  Sleepiness: No  Speech difficulty: No  Sweats: No  Feeling shaky: Yes    Hypoglycemia Complications  Blackouts: No  Hospitalization: No  Nocturnal hypoglycemia: Yes  Required assistance: No  Required glucagon injection: No  Seizures: No    Reducing Risks:  Reducing Risks Assessed Today: No  Diabetes Risks: Sedentary Lifestyle    Healthy Coping:  Stage of change: CONTEMPLATION (Considering change and yet undecided)  Patient Activation Measure Survey Score:  No flowsheet data found.          Emelina Vences RDN, LD  Outpatient Diabetes Education  Adult Diabetes Education Triage [phone]    Time Spent: 50 minutes  Encounter Type: Individual        Any diabetes medication dose changes were made via the Certified Diabetes Care & Education Protocol in collaboration with the patient's referring provider. A copy of this encounter was shared with the provider.

## 2022-03-18 NOTE — LETTER
3/18/2022         RE: Clarence Boyle  [id number]  [facility] [address]        Dear Colleague,    Thank you for referring your patient, Clarence Boyle, to the [facility] DIABETES EDUCATION [place]. Please see a copy of my visit note below.    Diabetes Self-Management Education & Support    Presents for: Follow-up    Type of Visit: Video Visit: 9:02a-10:06 am    How would patient like to obtain AVS? MyChart    ASSESSMENT:    Met with Clarence today to discuss how use of Dexcom, CGM is going.  Was unable to download his data via clarity marisol, but he was able to send me some data.  Is going very well staying on track with changes.  Is monitoring intake more closely, counting carbohydrates and dosing insulin more appropriately.  Has also increased physical activity level with the help of an apple watch.  Activity has increased to more walking and is now using the eliptical 3-4 times per week for 30-40 minutes.  He reports that having the CGM has been helpful to understand what his BG is doing.  He is experiencing some low BG.  Is finding some correlation between exercise and low BG.  He has started using calorie martha especially when eating out which has been helpful for carbohydrate counting and portion control.  Has stopped eating fast food at lunch time.  Occasionally will eat out on the weekends.    Clarence estimates that his BG might be going low twice per week and usually during the night which has kept him up sometimes during the night.  Has struggled getting it to come back up once and had to drink some soda.  We reviewed what he is eating for bedtime snack; is having some girl  cookies, fruit or ice cream for bedtime snack.     It seems Clarence's carbohydrate counting and meal time insulin dosing are more accurate than before using tools such as CGM, calorie martha and his dietary adjustments.  Clarence thinks his total daily dose of meal time insulin is less than 50 units/day.  Due to the increase in activity and  the low BG occurring during the night, I think Clarence would benefit from lowering the dose of basal insulin as a start.  Recommend an initial 2 unit decrease and an additional 2 unit decrease if he continues to see lows.  Recent A1C was 9%, so would like to avoid making large adjustments until it is closer to target range while also decreasing occurences of low BG.    Patient's most recent   Lab Results   Component Value Date    A1C 9.0 03/02/2022    A1C 8.4 04/28/2020    is not meeting goal of <7.0    Diabetes knowledge and skills assessment:   Patient is knowledgeable in diabetes management concepts related to: Healthy Eating, Being Active, Monitoring and Taking Medication    Continue education with the following diabetes management concepts: Healthy Eating, Taking Medication, Problem Solving, Reducing Risks and Healthy Coping    Based on learning assessment above, most appropriate setting for further diabetes education would be: Individual setting.    INTERVENTIONS:    Education provided today on:  AADE Self-Care Behaviors:  Healthy Eating: carbohydrate counting and weight reduction  Monitoring: log and interpret results  Taking Medication: action of prescribed medication and when to take medications  Problem Solving: low blood glucose - causes, signs/symptoms, treatment and prevention and carrying a carbohydrate source at all times    Opportunities for ongoing education and support in diabetes-self management were discussed. Pt verbalized understanding of concepts discussed and recommendations provided today.       Education Materials Provided:  No new materials today      PLAN    1) Try combining a source of carbohydrates and a source of protein with bedtime snack to see if that helps night time blood sugar.   1-2 carbohydrate servings (15-30 grams) AND a serving of protein- could be 1/4 C nuts with a banana, 2 T peanut butter/other nut butter on whole grain toast, 1/2 C cottage cheese with 1/2 C fruit, cheese  "with some whole grain crackers (typically 6-8 is a serving)    2) Goal is to reduce the occurrence of night time low blood sugars.  Try by decreasing glargine by 2 units (62 units) if still experiencing lows after 5-7 days, could decrease 2 more units to 60.    3) Continue with activity routine    4) When experiencing low BG, eat 15-20 grams of carbohydrates and wait 15 minutes.  If continues to be low try another 15-20 grams.  Once BG is steadily rising, try to eat a large snack or a meal within 45 minutes (including carbohydrates and protein).     Topics to cover at upcoming visits: Monitoring, Taking Medication, Reducing Risks and Healthy Coping  Follow-up: Pt would like to schedule on his own.    See Goals Section for co-developed, patient-stated behavior change goals.  AVS provided to patient today.      SUBJECTIVE / OBJECTIVE:  Presents for: Follow-up  Accompanied by: Self  Diabetes education in the past 24mo: Yes  Focus of Visit: CGM, Monitoring  Diabetes type: Type 2  Date of diagnosis: 12 years ago  Disease course: Improving  Diabetes management related comments/concerns: monitoring BG  Cultural Influences/Ethnic Background:   /       Diabetes Symptoms & Complications:  Fatigue: No  Neuropathy: Sometimes  Polydipsia: Yes  Polyphagia: No  Polyuria: Yes  Visual change: No  Slow healing wounds: No  Symptom course: Worsening  Weight trend: Stable  Complications assessed today?: No    Patient Problem List and Family Medical History reviewed for relevant medical history, current medical status, and diabetes risk factors.    Vitals:  There were no vitals taken for this visit.  Estimated body mass index is 36.98 kg/m  as calculated from the following:    Height as of 3/2/22: 1.778 m (5' 10\").    Weight as of 3/2/22: 116.9 kg (257 lb 11.2 oz).   Last 3 BP:   BP Readings from Last 3 Encounters:   03/02/22 118/82   09/23/19 (!) 137/90   05/20/19 125/78       History   Smoking Status     Never Smoker "   Smokeless Tobacco     Never Used       Labs:  Lab Results   Component Value Date    A1C 9.0 03/02/2022    A1C 8.4 04/28/2020     Lab Results   Component Value Date     03/02/2022     04/28/2020     Lab Results   Component Value Date    LDL 60 03/02/2022     Direct Measure HDL   Date Value Ref Range Status   03/02/2022 58 >=40 mg/dL Final   ]  GFR Estimate   Date Value Ref Range Status   03/02/2022 >90 >60 mL/min/1.73m2 Final     Comment:     Effective December 21, 2021 eGFRcr in adults is calculated using the 2021 CKD-EPI creatinine equation which includes age and gender (Kun et al., NEJ, DOI: 10.1056/BENLtm6568792)   04/28/2020 >90 >60 mL/min/[1.73_m2] Final     Comment:     Non  GFR Calc  Starting 12/18/2018, serum creatinine based estimated GFR (eGFR) will be   calculated using the Chronic Kidney Disease Epidemiology Collaboration   (CKD-EPI) equation.       GFR Estimate If Black   Date Value Ref Range Status   04/28/2020 >90 >60 mL/min/[1.73_m2] Final     Comment:      GFR Calc  Starting 12/18/2018, serum creatinine based estimated GFR (eGFR) will be   calculated using the Chronic Kidney Disease Epidemiology Collaboration   (CKD-EPI) equation.       Lab Results   Component Value Date    CR 0.80 03/02/2022    CR 0.65 04/28/2020     No results found for: MICROALBUMIN    Healthy Eating:  Healthy Eating Assessed Today: Yes  Meal planning/habits: None, Carb counting  How many times a week on average do you eat food made away from home (restaurant/take-out)?: 4  Meals include: Breakfast, Lunch, Dinner  Breakfast: Breakfast burrito [employer] OR Granola bars  Lunch: flatbread, frozen meals, yogurt that he can count CHO content  Dinner: at home- chicken, starch and veggie.  Has young kids, so eats pizza once per week, spaghetti  Snacks: granola bars, cheese and crackers  Other: avoids cereal and juice  Beverages: Diet soda, Water, Energy drinks (zero sugar monster,  flavored water)  Has patient met with a dietitian in the past?: Yes    Being Active:  Being Active Assessed Today: Yes  Exercise:: Yes  Days per week of moderate to strenuous exercise (like a brisk walk): 4  On average, minutes per day of exercise at this level: 30  How intense was your typical exercise? : Moderate (like brisk walking)  Exercise Minutes per Week: 120    Monitoring:  Monitoring Assessed Today: Yes  Did patient bring glucose meter to appointment? : No  Blood Glucose Meter: CGM  Times checking blood sugar at home (number): 1  Times checking blood sugar at home (per): Day  Blood glucose trend: No change (Generally high)    Glucose data:  Unable to connect to Dexcom through marisol.  Pt was able to send some of his data.  Per pt report- the last two weeks  Avg  mg/dL  80% in range                         Taking Medications:  Diabetes Medication(s)     Biguanides       metFORMIN (GLUCOPHAGE-XR) 500 MG 24 hr tablet    Take 4 tablets (2,000 mg) by mouth daily (with dinner) For additional refills, please schedule a follow-up appointment.    Insulin       Insulin Glargine-yfgn 100 UNIT/ML SOPN    Inject 70 Units Subcutaneous daily     Insulin Lispro (HUMALOG KWIKPEN) 200 UNIT/ML soln    Inject subcu 2 units per 10G CHO, correction scale of 1 units per 50 above 140. You can take 1/2 of this correction at bedtime. Approx 80 units daily. Lab draw needed. Call [address] to schedule.    Sodium-Glucose Co-Transporter 2 (SGLT2) Inhibitors       JARDIANCE 10 MG TABS tablet    TAKE 1 TABLET(10 MG) BY MOUTH DAILY      Of note- is taking 64 units of Glargine in the morning  Jardiance also taken in the morning    Taking Medication Assessed Today: Yes  Current Treatments: Insulin Injections, Oral Medication (taken by mouth)  Dose schedule: Pre-breakfast, Pre-lunch, Pre-dinner, At bedtime  Given by: Patient  Problems taking diabetes medications regularly?: No  Diabetes medication side effects?: Yes    Problem  Solving:  Problem Solving Assessed Today: Yes  Is the patient at risk for hypoglycemia?: Yes  Hypoglycemia Frequency: Weekly  Hypoglycemia Treatment: Other food (apple with peanut butter, glass of milk, eats granola bar)    Hypoglycemia symptoms  Confusion: No  Dizziness or Light-Headedness: Yes  Headaches: No  Hunger: Yes  Mood changes: Yes  Nervousness/Anxiety: Yes (heart racing)  Sleepiness: No  Speech difficulty: No  Sweats: No  Feeling shaky: Yes    Hypoglycemia Complications  Blackouts: No  Hospitalization: No  Nocturnal hypoglycemia: Yes  Required assistance: No  Required glucagon injection: No  Seizures: No    Reducing Risks:  Reducing Risks Assessed Today: No  Diabetes Risks: Sedentary Lifestyle    Healthy Coping:  Stage of change: CONTEMPLATION (Considering change and yet undecided)  Patient Activation Measure Survey Score:  No flowsheet data found.          Emelina Vences RDN, LD  Outpatient Diabetes Education  Adult Diabetes Education Triage [phone]    Time Spent: 50 minutes  Encounter Type: Individual        Any diabetes medication dose changes were made via the Certified Diabetes Care & Education Protocol in collaboration with the patient's referring provider. A copy of this encounter was shared with the provider.

## 2022-03-18 NOTE — Clinical Note
Dr. Interiano,   Clarence is doing really well with lifestyle changes since starting the Dexcom CGM.  He is experiencing some lows during the night.  We discussed lowering his basal insulin as a start.  Below is some additional information regarding our discussion.    Clarence's carbohydrate counting and meal time insulin dosing are more accurate than in the past.  Clarence thinks his total daily dose of meal time insulin is less than 50 units/day.  Due to the increase in activity and the low BG occurring during the night, I think Clarence would benefit from lowering the dose of basal insulin as a start.  Recommend an initial 2 unit decrease and an additional 2 unit decrease if he continues to see lows.  Recent A1C was 9%, so would like to avoid making large adjustments until it is closer to target range while also decreasing occurences of low BG.    Thanks,   Emelina Vences RDN, LD  Outpatient Diabetes Education  Adult Diabetes Education Triage [phone]

## 2022-04-06 DIAGNOSIS — E11.9 TYPE 2 DIABETES MELLITUS WITHOUT COMPLICATION, UNSPECIFIED WHETHER LONG TERM INSULIN USE (H): ICD-10-CM

## 2022-04-06 DIAGNOSIS — E66.01 MORBID OBESITY (H): ICD-10-CM

## 2022-04-08 NOTE — TELEPHONE ENCOUNTER
BUPROPION SR 150MG TABLETS (12 H)      Last Written Prescription Date:  11-8-21  Last Fill Quantity: 180,   # refills: 3  Last Office Visit : 11-8-21 ( RTC 6 M)  Future Office visit:  none    Routing refill request to provider for review/approval because:  Med not on protocol

## 2022-04-11 RX ORDER — BUPROPION HYDROCHLORIDE 150 MG/1
150 TABLET, EXTENDED RELEASE ORAL 2 TIMES DAILY
Qty: 180 TABLET | Refills: 3 | Status: SHIPPED | OUTPATIENT
Start: 2022-04-11 | End: 2023-05-04

## 2022-05-11 ENCOUNTER — APPOINTMENT (OUTPATIENT)
Dept: INTERNAL MEDICINE | Facility: CLINIC | Age: 42
End: 2022-05-11
Payer: COMMERCIAL

## 2022-05-12 ENCOUNTER — OFFICE VISIT (OUTPATIENT)
Dept: URGENT CARE | Facility: URGENT CARE | Age: 42
End: 2022-05-12
Payer: COMMERCIAL

## 2022-05-12 VITALS
OXYGEN SATURATION: 98 % | HEART RATE: 91 BPM | DIASTOLIC BLOOD PRESSURE: 86 MMHG | SYSTOLIC BLOOD PRESSURE: 124 MMHG | TEMPERATURE: 98 F

## 2022-05-12 DIAGNOSIS — Z79.4 TYPE 2 DIABETES MELLITUS WITH HYPERGLYCEMIA, WITH LONG-TERM CURRENT USE OF INSULIN (H): ICD-10-CM

## 2022-05-12 DIAGNOSIS — L02.611 ABSCESS OF GREAT TOE OF RIGHT FOOT: Primary | ICD-10-CM

## 2022-05-12 DIAGNOSIS — E11.65 TYPE 2 DIABETES MELLITUS WITH HYPERGLYCEMIA, WITH LONG-TERM CURRENT USE OF INSULIN (H): ICD-10-CM

## 2022-05-12 PROCEDURE — 99214 OFFICE O/P EST MOD 30 MIN: CPT | Mod: 25 | Performed by: FAMILY MEDICINE

## 2022-05-12 PROCEDURE — 10060 I&D ABSCESS SIMPLE/SINGLE: CPT | Performed by: FAMILY MEDICINE

## 2022-05-12 RX ORDER — DOXYCYCLINE 100 MG/1
100 CAPSULE ORAL 2 TIMES DAILY
Qty: 20 CAPSULE | Refills: 0 | Status: SHIPPED | OUTPATIENT
Start: 2022-05-12 | End: 2022-05-22

## 2022-05-12 ASSESSMENT — PAIN SCALES - GENERAL: PAINLEVEL: MODERATE PAIN (4)

## 2022-05-12 NOTE — PROGRESS NOTES
ASSESSMENT/  PLAN:  Abscess of great toe of right foot     - DRAIN SKIN ABSCESS SIMPLE/SINGLE  - doxycycline hyclate (VIBRAMYCIN) 100 MG capsule; Take 1 capsule (100 mg) by mouth 2 times daily for 10 days      Go to Urgent care or Emergency Department if worsening fevers/chills and/or spreading infection.  Warm, moist packs or towels can be applied to the region to aid in resolution of the infection.  Use Tylenol or ibuprofen for pain or fever.  The wound should be covered with absorptive gauze until drainage from the site resolves          Type 2 diabetes mellitus with hyperglycemia, with long-term current use of insulin (H)    Hgb a1c 9.0 two months ago-   Diabetes treated with metformin, Jardiance and insulin  Increased risk of worsening infection due to diabetes    Procedure note      Patient gave verbal  to perform incision and drainage of the involved area    Anesthesia: None     Wound and surrounding skin was cleaned with antiseptic  - povidone  The wound was incised 5 mm with a scalpal with drainage of a moderate amount of purulent material     Bandage was applied  Patient tolerated the procedure well     ------------------------------------------------------------------------------------------------------------------        SUBJECTIVE:   Chief Complaint   Patient presents with     Urgent Care     Right big toe pain and redness tx: epson salt, elevation     Clarence Boyle is a 42 year old male who presents for evaluation of and area of redness, tenderness, swelling and warmth of the skin that developed on the  right, dorsal great toe  .  Patient has had pain, skin erythema (reddened skin) and tenderness for 2 days.    Precipitating event was unknown, -  Possible shoe rubbing.    Therapies tried: epsom salt soaks, elevation.    Past Medical History:   Diagnosis Date     Diabetes (H)      Hypertension      Obese      Patient Active Problem List   Diagnosis     Urethral stricture     Diabetes mellitus, type 2  (H)     Allergic rhinitis     Disturbance of skin sensation     Dribbling urine     ED (erectile dysfunction)     Genital herpes     H/O urethral stricture     HTN (hypertension)     Hyperlipidemia     Long-term insulin use (H)     Marginal corneal ulcer     Morbid obesity (H)     Myopia     Regular astigmatism       ALLERGIES:  Liraglutide and Penicillins    MEDs  blood glucose (NO BRAND SPECIFIED) test strip, Use to test blood sugar 3 times daily or as directed. Any Brand covered by insurance that meets Pt need.  blood glucose monitoring (NO BRAND SPECIFIED) meter device kit, Use to test blood sugar 4 times daily or as directed.  blood glucose monitoring (NO BRAND SPECIFIED) meter device kit, Use to test blood sugar 3 times daily or as directed. Any Brand covered by insurance.  buPROPion (WELLBUTRIN SR) 150 MG 12 hr tablet, Take 1 tablet (150 mg) by mouth in the morning and 1 tablet (150 mg) in the evening. For additional refills, please schedule a follow-up appointment.  Continuous Blood Gluc Sensor (DEXCOM G6 SENSOR) MISC, Change every 10 days.  Continuous Blood Gluc Transmit (DEXCOM G6 TRANSMITTER) MISC, Change every 3 months.  Insulin Glargine-yfgn 100 UNIT/ML SOPN, Inject 70 Units Subcutaneous daily  Insulin Lispro (HUMALOG KWIKPEN) 200 UNIT/ML soln, Inject subcu 2 units per 10G CHO, correction scale of 1 units per 50 above 140. You can take 1/2 of this correction at bedtime. Approx 80 units daily. Lab draw needed. Call [address] to schedule.  insulin pen needle (BD TONY U/F) 32G X 4 MM miscellaneous, Use 5  pen needles daily or as directed.  JARDIANCE 10 MG TABS tablet, TAKE 1 TABLET(10 MG) BY MOUTH DAILY  lisinopril (ZESTRIL) 10 MG tablet, TAKE 1 TABLET(10 MG) BY MOUTH DAILY WITH DINNER  loratadine (CLARITIN) 10 MG tablet, Take 10 mg by mouth  metFORMIN (GLUCOPHAGE-XR) 500 MG 24 hr tablet, Take 4 tablets (2,000 mg) by mouth daily (with dinner) For additional refills, please schedule a follow-up  appointment.  Multiple Vitamins-Minerals (MULTI COMPLETE PO),   simvastatin (ZOCOR) 20 MG tablet, TAKE 1 TABLET(20 MG) BY MOUTH DAILY  tadalafil (CIALIS) 10 MG tablet, Take 10 mg by mouth    No current facility-administered medications on file prior to visit.      Social History     Tobacco Use     Smoking status: Never Smoker     Smokeless tobacco: Never Used   Substance Use Topics     Alcohol use: Yes     Comment: 2-3/month       Family History   Problem Relation Age of Onset     Cancer Mother         Gynecologic     Thyroid Disease Mother      No Known Problems Father      Cancer Maternal Grandmother      Diabetes Paternal Grandmother      Mental Illness Paternal Grandmother      Colon Cancer Cousin         Approximately late 30s         ROS:  CONSTITUTIONAL:NEGATIVE for fever, chills,    EYES: NEGATIVE for vision changes or irritation  ENT/MOUTH: NEGATIVE for ear, mouth and throat problems  RESP:NEGATIVE for significant cough or SOB  GI: NEGATIVE for nausea, abdominal pain,   or change in bowel habits    OBJECTIVE:  /86   Pulse 91   Temp 98  F (36.7  C) (Tympanic)   SpO2 98%     Skin area involved is 3 cm by 3 cm on the right,  Dorsal right great toe   .   The skin appear erythematous, moderately swollen, with tenderness and warmth to the touch.-  The area is fluctuant-  Proximal to the toenail     EYES:   EOMI,   conjunctiva clear  HENT:   External ears with no swelling or lesions   Nose and lips without  Swelling, ulcers, erythema or lesions  NECK:   normal pain free ROM  RESP:   no labored respirations, no tachypnea  EXTREMITIES:   Full ROM without expression of pain or limitation x 4 extremities  NEURO:   Normal strength and tone, ambulation without difficulty,   normal speech and mentation

## 2022-06-14 ENCOUNTER — TELEPHONE (OUTPATIENT)
Dept: INTERNAL MEDICINE | Facility: CLINIC | Age: 42
End: 2022-06-14
Payer: COMMERCIAL

## 2022-06-14 DIAGNOSIS — H10.33 ACUTE BACTERIAL CONJUNCTIVITIS OF BOTH EYES: Primary | ICD-10-CM

## 2022-06-14 DIAGNOSIS — E11.9 TYPE 2 DIABETES MELLITUS WITHOUT COMPLICATION, UNSPECIFIED WHETHER LONG TERM INSULIN USE (H): ICD-10-CM

## 2022-06-14 DIAGNOSIS — E66.01 MORBID OBESITY (H): ICD-10-CM

## 2022-06-14 RX ORDER — TOBRAMYCIN AND DEXAMETHASONE 3; 1 MG/ML; MG/ML
1-2 SUSPENSION/ DROPS OPHTHALMIC EVERY 4 HOURS
Qty: 5 ML | Refills: 0 | Status: SHIPPED | OUTPATIENT
Start: 2022-06-14 | End: 2022-06-17

## 2022-06-14 NOTE — TELEPHONE ENCOUNTER
metFORMIN (GLUCOPHAGE XR) 500 MG 24 hr tablet    Last Written Prescription Date:  3/6/22  Last Fill Quantity: 360,   # refills: 0  Last Office Visit : 11/8/21  Future Office visit:  None ( 6 months)    Routing refill request to provider for review/approval because:  Abn A1c ordered ny PCP. appt due. Scheduling has been notified to contact the pt for appointment.  3/2/22 A1C 9.0*

## 2022-06-14 NOTE — TELEPHONE ENCOUNTER
Pt calls stating he has COVID, tested positive on Sunday.     On Sunday evening, he started getting Pink Eye symptoms.      Having a lot of Drainage, pus and crusty. Both eyes. Worse at night. Has to use warm washcloth throughout the day.   Red eyes.   Seems to be getting worse.     There are no virtual openings with any providers.     Please advise. He is asking about eye Rx.   New pt for Dr Contreras, Last OV on 3/2/22.

## 2022-06-17 RX ORDER — METFORMIN HCL 500 MG
2000 TABLET, EXTENDED RELEASE 24 HR ORAL
Qty: 360 TABLET | Refills: 0 | Status: SHIPPED | OUTPATIENT
Start: 2022-06-17 | End: 2022-09-26

## 2022-06-23 RX ORDER — PROCHLORPERAZINE 25 MG/1
SUPPOSITORY RECTAL
Qty: 9 EACH | Refills: 1 | Status: SHIPPED | OUTPATIENT
Start: 2022-06-23 | End: 2022-12-24

## 2022-06-23 NOTE — TELEPHONE ENCOUNTER
Continuous Blood Gluc Sensor (DEXCOM G6 SENSOR) [facility] [place]    11/8/2021  [facility] [place]       Jennifer Interiano MD    Endocrinology, Diabetes, and Metabolism

## 2022-06-29 DIAGNOSIS — E66.01 MORBID OBESITY (H): ICD-10-CM

## 2022-06-29 DIAGNOSIS — E11.9 TYPE 2 DIABETES MELLITUS WITHOUT COMPLICATION, UNSPECIFIED WHETHER LONG TERM INSULIN USE (H): ICD-10-CM

## 2022-06-29 RX ORDER — LISINOPRIL 10 MG/1
TABLET ORAL
Qty: 90 TABLET | Refills: 3 | Status: SHIPPED | OUTPATIENT
Start: 2022-06-29 | End: 2022-09-27

## 2022-08-09 ENCOUNTER — TRANSFERRED RECORDS (OUTPATIENT)
Dept: HEALTH INFORMATION MANAGEMENT | Facility: CLINIC | Age: 42
End: 2022-08-09

## 2022-08-25 DIAGNOSIS — E11.65 TYPE 2 DIABETES MELLITUS WITH HYPERGLYCEMIA, UNSPECIFIED WHETHER LONG TERM INSULIN USE (H): ICD-10-CM

## 2022-08-25 RX ORDER — INSULIN GLARGINE-YFGN 100 [IU]/ML
70 INJECTION, SOLUTION SUBCUTANEOUS DAILY
Qty: 69 ML | Refills: 1 | Status: SHIPPED | OUTPATIENT
Start: 2022-08-25 | End: 2023-06-28

## 2022-08-25 NOTE — TELEPHONE ENCOUNTER
Insulin Glargine-yfgn 100 UNIT/ML SOPN 70 mL 1 2/2/2022        Last Written Prescription Date:  2-2-22  Last Fill Quantity: 70 ml,   # refills: 1  Last Office Visit : 11-8-21  Future Office visit:  None    Routing refill request to provider for review/approval because:  Refill per clinic       Amy Castañeda RN

## 2022-09-18 ENCOUNTER — HEALTH MAINTENANCE LETTER (OUTPATIENT)
Age: 42
End: 2022-09-18

## 2022-09-26 ENCOUNTER — MYC REFILL (OUTPATIENT)
Dept: ENDOCRINOLOGY | Facility: CLINIC | Age: 42
End: 2022-09-26

## 2022-09-26 DIAGNOSIS — E11.9 TYPE 2 DIABETES MELLITUS WITHOUT COMPLICATION, UNSPECIFIED WHETHER LONG TERM INSULIN USE (H): ICD-10-CM

## 2022-09-26 DIAGNOSIS — E66.01 MORBID OBESITY (H): ICD-10-CM

## 2022-09-26 RX ORDER — METFORMIN HCL 500 MG
2000 TABLET, EXTENDED RELEASE 24 HR ORAL
Qty: 360 TABLET | Refills: 0 | Status: SHIPPED | OUTPATIENT
Start: 2022-09-26 | End: 2022-11-29

## 2022-09-27 DIAGNOSIS — E66.01 MORBID OBESITY (H): ICD-10-CM

## 2022-09-27 DIAGNOSIS — E11.9 TYPE 2 DIABETES MELLITUS WITHOUT COMPLICATION, UNSPECIFIED WHETHER LONG TERM INSULIN USE (H): ICD-10-CM

## 2022-09-27 RX ORDER — SIMVASTATIN 20 MG
20 TABLET ORAL DAILY
Qty: 90 TABLET | Refills: 1 | Status: SHIPPED | OUTPATIENT
Start: 2022-09-27 | End: 2023-06-01

## 2022-09-27 RX ORDER — LISINOPRIL 10 MG/1
TABLET ORAL
Qty: 90 TABLET | Refills: 1 | Status: SHIPPED | OUTPATIENT
Start: 2022-09-27 | End: 2023-05-01

## 2022-10-19 ENCOUNTER — IMMUNIZATION (OUTPATIENT)
Dept: NURSING | Facility: CLINIC | Age: 42
End: 2022-10-19
Payer: COMMERCIAL

## 2022-10-19 PROCEDURE — 91313 COVID-19,PF,MODERNA BIVALENT: CPT

## 2022-10-19 PROCEDURE — 0134A COVID-19,PF,MODERNA BIVALENT: CPT

## 2022-11-29 DIAGNOSIS — E11.9 TYPE 2 DIABETES MELLITUS WITHOUT COMPLICATION, UNSPECIFIED WHETHER LONG TERM INSULIN USE (H): ICD-10-CM

## 2022-11-29 DIAGNOSIS — E66.01 MORBID OBESITY (H): ICD-10-CM

## 2022-11-29 RX ORDER — METFORMIN HCL 500 MG
2000 TABLET, EXTENDED RELEASE 24 HR ORAL
Qty: 360 TABLET | Refills: 1 | Status: SHIPPED | OUTPATIENT
Start: 2022-11-29 | End: 2023-07-07

## 2022-12-21 DIAGNOSIS — E11.65 TYPE 2 DIABETES MELLITUS WITH HYPERGLYCEMIA, UNSPECIFIED WHETHER LONG TERM INSULIN USE (H): Primary | ICD-10-CM

## 2022-12-22 ENCOUNTER — IMMUNIZATION (OUTPATIENT)
Dept: INTERNAL MEDICINE | Facility: CLINIC | Age: 42
End: 2022-12-22
Payer: COMMERCIAL

## 2022-12-22 DIAGNOSIS — Z23 NEED FOR PROPHYLACTIC VACCINATION AND INOCULATION AGAINST INFLUENZA: Primary | ICD-10-CM

## 2022-12-22 PROCEDURE — 90686 IIV4 VACC NO PRSV 0.5 ML IM: CPT

## 2022-12-22 PROCEDURE — 99207 PR NO CHARGE NURSE ONLY: CPT

## 2022-12-22 PROCEDURE — 90471 IMMUNIZATION ADMIN: CPT

## 2022-12-24 RX ORDER — PROCHLORPERAZINE 25 MG/1
SUPPOSITORY RECTAL
Qty: 9 EACH | Refills: 0 | Status: SHIPPED | OUTPATIENT
Start: 2022-12-24 | End: 2023-03-24

## 2022-12-24 NOTE — TELEPHONE ENCOUNTER
Continuous Blood Gluc Sensor (DEXCOM G6 SENSOR) [facility] [place]    11/8/2021  [facility] [place]     Jennifer Interiano MD  Endocrinology, Diabetes, and Metabolism    Nv: TRINH aviles   [place]  2/20/23

## 2023-02-13 ENCOUNTER — MYC MEDICAL ADVICE (OUTPATIENT)
Dept: ENDOCRINOLOGY | Facility: CLINIC | Age: 43
End: 2023-02-13
Payer: COMMERCIAL

## 2023-02-13 NOTE — PROGRESS NOTES
"THIS IS A VIDEO VISIT:    Phone call visit/virtual visit encounter:    Name of patient: Clarence Boyle    Date of encounter: 2/20/2023    Time of start of video visit: 11:30    Video started: 11:39    Video ended: 12:00    Provider location: working from home/ [facility]    Patient location: patients home.    Mode of transmission: [email or web address] video/ [employer]    Verbal consent: obtained before starting visit. Pt is agreeable.      The patient has been notified of following:      \"This VIDEO visit will be conducted via a call between you and your physician/provider. We have found that certain health care needs can be provided without the need for a physical exam.  This service lets us provide the care you need with a short phone conversation.  If a prescription is necessary we can send it directly to your pharmacy.  If lab work is needed we can place an order for that and you can then stop by our lab to have the test done at a later time.     With new updates with corona virus patient might be billed as clinic visit.     If during the course of the call the physician/provider feels a telephone visit is not appropriate, you will not be charged for this service.\"      Past medical history, social history, family history, allergy and medications were reviewed and updated as appropriate.  Reviewed pertinent labs, notes, imaging studies personally.    ENDOCRINOLOGY CLINIC NOTE:  Name: Clarence Boyle  Self referral for Diabetes.  HPI:  Clarence Boyle is a 42 year old male who presents for the evaluation/management of Diabetes.   has a past medical history of Diabetes (H), Hypertension, and Obese.    Was seen by  before at [facility].  Was followed by CDE closely as well.  Available records, labs and images from outside clinic were personally reviewed.    Reports on and off low BG.  Appetite is OK.  Weight: mostly stable.    Has never been on insulin pump.  Using DEXCOM.    1. Type 2 DM:  Orginally diagnosed at the age of: in " 30s.  Current Regimen:   Metformin 2000 mg with dinner.  Jardiance 10 mg/day  Semglee 70 units in AM  Humalog 2 unit/10 gm of CHO + ssi 1:50> 140.   (1/2 of correction at beditme)  (about 18-20 units TID)    yes:     Diabetes Medication(s)     Biguanides       metFORMIN (GLUCOPHAGE XR) 500 MG 24 hr tablet    Take 4 tablets (2,000 mg) by mouth daily (with dinner)    Insulin       Insulin Glargine-yfgn (SEMGLEE, YFGN,) 100 UNIT/ML SOPN    Inject 70 Units Subcutaneous daily Call to schedule with new provider Dr Interiano leaving     Insulin Lispro (HUMALOG KWIKPEN) 200 UNIT/ML soln    Inject subcu 2 units per 10G CHO, correction scale of 1 units per 50 above 140. You can take 1/2 of this correction at bedtime. Approx 80 units daily.    Sodium-Glucose Co-Transporter 2 (SGLT2) Inhibitors       empagliflozin (JARDIANCE) 10 MG TABS tablet    Take 1 tablet (10 mg) by mouth daily        Victoza-- had GI s/e.    BS checks: DEXCOM  Average Meter Download: Blood glucose data reviewed personally. See nursing note from this encounter for details.  Last A1c: 9.0%  Symptoms of hypoglycemia (low blood sugar):  Gets symptoms of hypoglycemia.    DM Complications:   Complications:   Diabetes Complications  Description / Detail    Diabetic Retinopathy  No   CAD / PAD  No   Neuropathy  No   Nephropathy / Microalbuminuria  No   Gastroparesis  No   Hypoglycemia Unawarness  No       2. Hypertension:    on medications  3. Hyperlipidemia: on medications  Medications     HMG CoA Reductase Inhibitors     simvastatin (ZOCOR) 20 MG tablet             PMH/PSH:  Past Medical History:   Diagnosis Date     Diabetes (H)      Hypertension      Obese      Past Surgical History:   Procedure Laterality Date     URETHROPLASTY STAGE ONE WITH BUCCAL GRAFT N/A 5/2/2018    Procedure: URETHROPLASTY STAGE ONE WITH BUCCAL GRAFT;  Posterior Urethroplasty with Single Buccal Mucosa Graft;  Surgeon: Herb Awad MD;  Location:  OR     Family Hx:  Family  History   Problem Relation Age of Onset     Cancer Mother         Gynecologic     Thyroid Disease Mother      No Known Problems Father      Cancer Maternal Grandmother      Diabetes Paternal Grandmother      Mental Illness Paternal Grandmother      Colon Cancer Cousin         Approximately late 30s       Diabetes: maternal aunt, p.grandmother.    Social Hx:  Social History     Socioeconomic History     Marital status:      Spouse name: Not on file     Number of children: Not on file     Years of education: Not on file     Highest education level: Not on file   Occupational History     Not on file   Tobacco Use     Smoking status: Never     Smokeless tobacco: Never   Vaping Use     Vaping Use: Never used   Substance and Sexual Activity     Alcohol use: Yes     Comment: 2-3/month     Drug use: No     Sexual activity: Yes     Partners: Female   Other Topics Concern     Not on file   Social History Narrative     Not on file     Social Determinants of Health     Financial Resource Strain: Not on file   Food Insecurity: Not on file   Transportation Needs: Not on file   Physical Activity: Not on file   Stress: Not on file   Social Connections: Not on file   Intimate Partner Violence: Not on file   Housing Stability: Not on file          MEDICATIONS:  has a current medication list which includes the following prescription(s): metformin, blood glucose, blood glucose monitoring, blood glucose monitoring, bupropion, dexcom g6 sensor, dexcom g6 transmitter, empagliflozin, insulin glargine-yfgn, humalog kwikpen, bd maty u/f, lisinopril, loratadine, multiple vitamins-minerals, simvastatin, and tadalafil.    ROS     ROS: 10 point ROS neg other than the symptoms noted above in the HPI.    Physical Exam   VS: There were no vitals taken for this visit.  GENERAL: healthy, alert and no distress  EYES: Eyes grossly normal to inspection, conjunctivae and sclerae normal  ENT: no nose swelling, nasal discharge.  Thyroid: no apparent  thyroid nodules  RESP: no audible wheeze, cough, or visible cyanosis.  No visible retractions or increased work of breathing.  Able to speak fully in complete sentences.  ABDO: not evaluated.  EXTREMITIES: no hand tremors.  NEURO: Cranial nerves grossly intact, mentation intact and speech normal  SKIN: No apparent skin lesions, rash or edema seen   PSYCH: mentation appears normal, affect normal/bright, judgement and insight intact, normal speech and appearance well-groomed    LABS:  A1c:  Lab Results   Component Value Date    A1C 9.0 03/02/2022    A1C 8.3 11/04/2021    A1C 8.4 04/28/2020    A1C 7.7 01/10/2019    A1C 7.2 08/07/2018    A1C 7.5 04/27/2018       Creatinine:  Creatinine   Date Value Ref Range Status   03/02/2022 0.80 0.66 - 1.25 mg/dL Final   04/28/2020 0.65 (L) 0.66 - 1.25 mg/dL Final   ]    Urine Micro:  Lab Results   Component Value Date    MICROL <5 03/02/2022    MICROL <5 04/28/2020     No results found for: MICROALBUMIN      LFTs/Lipids:  Lab Results   Component Value Date    CHOL 142 03/02/2022     Lab Results   Component Value Date    HDL 58 03/02/2022     Lab Results   Component Value Date    LDL 60 03/02/2022     Lab Results   Component Value Date    TRIG 120 03/02/2022     No results found for: CHOLHDLRATIO    TFTs:  TSH   Date Value Ref Range Status   03/02/2022 2.20 0.40 - 4.00 mU/L Final   ]        All pertinent notes, labs, and images personally reviewed by me.     Glucometer/ insulin pump (if applicable)/ CGM data (if applicable) downloaded, Personally reviewed and interpreted.  All Blood sugar data reviewed personally and discussed with pt.  See nursing note from 2/20/2023 for details of BG/CGM log.      A/P  Mr.Ryan PHONG Boyle is a 42 year old here for the evaluation/management of diabetes:    1. DM2 - Under poor control.  A1c 9.0%  No know complications.  BG high during day.  Overnight BG- few low BG noted.  Plan:  Discussed diagnosis, pathophysiology, management and treatment options of  condition with pt.  I also discussed importance of strict blood sugar control to prevent complications associated with uncontrolled diabetes.  Continue metformin and Humalog at current dose.  Increase Jardiance to 25 mg/day.  Decrease Semglee to 65 units/day.  Decrease carbs with meals.  Continue to use DEXCOM.  Consider insulin pump- MEDTRONIC, OMNIPOD and Tandem.  Labs in 3/2023 with PCP.  Consider GLP1-RA in future. (noted to have GI s/e on Victoza)  Follow up with endocrinology in 3 months with labs prior.  Please make a lab appointment for blood work and follow up clinic appointment in 1 week after that to discuss results.    2. Hypertension - Under Good control.  On lisinopril.    3. Hyperlipidemia - On simvastatin 20 mg/day. LDL 60    4. Prevention:  Opthalmology- recommend annually  ASA-not indicated.  Smoking- No    Most Recent Immunizations   Administered Date(s) Administered     COVID-19 Vaccine ([employer]) 12/28/2021     COVID-19 Vaccine 12+ (Pfizer) 12/28/2021     COVID-19 Vaccine Bivalent Booster 18+ (Moderna) 10/19/2022     Hep B, Peds or Adolescent 04/12/1999     HepA-ped 2 Dose 02/21/2000     HepB-Adult 09/11/2013     Influenza (IIV3) PF 09/11/2013     Influenza Vaccine >6 months (Alfuria,Fluzone) 12/22/2022     Influenza Vaccine, 6+MO IM (QUADRIVALENT W/PRESERVATIVES) 11/26/2019     MMR 08/05/1992     Pneumococcal 23 valent 11/17/2008     Td (Adult), Adsorbed 11/10/1995     Tdap (Adacel,Boostrix) 03/20/2013         Recommend checking blood sugars before meals and at bedtime.    If Blood glucose are low more often-> 2-3 times/week- give us a call.  The patient is advised to Make better food choices: reduce carbs, Reduce portion size, weight loss and exercise 3-4 times a week.  Discussed hypoglycemia signs and symptoms as well as management in detail.    There is some variability among people, most will usually develop symptoms suggestive of hypoglycemia when blood glucose levels are lowered to the mid  60's. The first set of symptoms are called adrenergic. Patients may experience any of the following nervousness, sweating, intense hunger, trembling, weakness, palpitations, and difficulty speaking.   The acute management of hypoglycemia involves the rapid delivery of a source of easily absorbed sugar. Regular soda, juice, lifesavers, table sugar, are good options. 15 grams of glucose is the dose that is given, followed by an assessment of symptoms and a blood glucose check if possible. If after 10 minutes there is no improvement, another 10-15 grams should be given. This can be repeated up to three times. The equivalency of 10-15 grams of glucose (approximate servings) are: Four lifesavers, 4 teaspoons of sugar, or 1/2 can of regular soda or juice.     All questions were answered.  The patient indicates understanding of the above issues and agrees with the plan set forth.     Follow-up:  3 months.    Nayana Salinas M.D  Endocrinology  [facility]/[place]  CC: Damien Contreras    Disclaimer: This note consists of symbols derived from keyboarding, dictation and/or voice recognition software. As a result, there may be errors in the script that have gone undetected. Please consider this when interpreting information found in this chart.    Addendum to above note and clinic visit:    Labs reviewed.    See result note/telephone encounter.

## 2023-02-14 ENCOUNTER — TELEPHONE (OUTPATIENT)
Dept: ENDOCRINOLOGY | Facility: CLINIC | Age: 43
End: 2023-02-14

## 2023-02-14 DIAGNOSIS — E11.9 TYPE 2 DIABETES MELLITUS WITHOUT COMPLICATION, UNSPECIFIED WHETHER LONG TERM INSULIN USE (H): ICD-10-CM

## 2023-02-14 DIAGNOSIS — E66.01 MORBID OBESITY (H): ICD-10-CM

## 2023-02-14 RX ORDER — INSULIN LISPRO 200 [IU]/ML
INJECTION, SOLUTION SUBCUTANEOUS
Qty: 15 ML | Refills: 0 | Status: SHIPPED | OUTPATIENT
Start: 2023-02-14 | End: 2023-04-05

## 2023-02-14 NOTE — TELEPHONE ENCOUNTER
Insulin Lispro (HUMALOG KWIKPEN) 200 UNIT/ML soln        Last Written Prescription Date:  05/27/21  Last Fill Quantity: 90mL,   # refills: 3  Last Office Visit : 11/08/21  Future Office visit:  02/20/23    Routing refill request to provider for review/approval because:  Insulin - refilled per clinic

## 2023-02-14 NOTE — TELEPHONE ENCOUNTER
PA Initiation    Medication: Dexcom pa pending  Insurance Company: [employer] - Phone [phone] Fax [phone]  Pharmacy Filling the Rx:    Filling Pharmacy Phone:    Filling Pharmacy Fax:    Start Date: 2/14/2023      Transmitter pa not needed

## 2023-02-15 NOTE — TELEPHONE ENCOUNTER
Prior Authorization Approval    Authorization Effective Date: 2/14/2023  Authorization Expiration Date: 2/13/2024  Medication: Dexcom pa Approvewd  Approved Dose/Quantity: 3  Reference #: [id number]   Insurance Company: [email or web address] - Phone [phone] Fax [phone]  Expected CoPay:       CoPay Card Available:      Foundation Assistance Needed:    Which Pharmacy is filling the prescription (Not needed for infusion/clinic administered):    Pharmacy Notified:    Patient Notified:

## 2023-02-20 ENCOUNTER — VIRTUAL VISIT (OUTPATIENT)
Dept: ENDOCRINOLOGY | Facility: CLINIC | Age: 43
End: 2023-02-20
Payer: COMMERCIAL

## 2023-02-20 DIAGNOSIS — E11.65 TYPE 2 DIABETES MELLITUS WITH HYPERGLYCEMIA, WITH LONG-TERM CURRENT USE OF INSULIN (H): Primary | ICD-10-CM

## 2023-02-20 DIAGNOSIS — Z79.4 LONG-TERM INSULIN USE (H): ICD-10-CM

## 2023-02-20 DIAGNOSIS — Z79.4 TYPE 2 DIABETES MELLITUS WITH HYPERGLYCEMIA, WITH LONG-TERM CURRENT USE OF INSULIN (H): Primary | ICD-10-CM

## 2023-02-20 PROCEDURE — 95251 CONT GLUC MNTR ANALYSIS I&R: CPT | Performed by: INTERNAL MEDICINE

## 2023-02-20 PROCEDURE — 99214 OFFICE O/P EST MOD 30 MIN: CPT | Mod: VID | Performed by: INTERNAL MEDICINE

## 2023-02-20 NOTE — LETTER
"    2/20/2023         RE: Clarence Boyle  [id number]  [place] [address]        Dear Colleague,    Thank you for referring your patient, Clarence Boyle, to the [facility]. Please see a copy of my visit note below.    THIS IS A VIDEO VISIT:    Phone call visit/virtual visit encounter:    Name of patient: Clarence Boyle    Date of encounter: 2/20/2023    Time of start of video visit: 11:30    Video started: 11:39    Video ended: 12:00    Provider location: working from home/ [facility]    Patient location: patients home.    Mode of transmission: [employer] video/ [employer]    Verbal consent: obtained before starting visit. Pt is agreeable.      The patient has been notified of following:      \"This VIDEO visit will be conducted via a call between you and your physician/provider. We have found that certain health care needs can be provided without the need for a physical exam.  This service lets us provide the care you need with a short phone conversation.  If a prescription is necessary we can send it directly to your pharmacy.  If lab work is needed we can place an order for that and you can then stop by our lab to have the test done at a later time.     With new updates with corona virus patient might be billed as clinic visit.     If during the course of the call the physician/provider feels a telephone visit is not appropriate, you will not be charged for this service.\"      Past medical history, social history, family history, allergy and medications were reviewed and updated as appropriate.  Reviewed pertinent labs, notes, imaging studies personally.    ENDOCRINOLOGY CLINIC NOTE:  Name: Clarence Boyle  Self referral for Diabetes.  HPI:  Clarence Boyle is a 42 year old male who presents for the evaluation/management of Diabetes.   has a past medical history of Diabetes (H), Hypertension, and Obese.    Was seen by  before at [facility].  Was followed by CDE closely as well.  Available records, " labs and images from outside clinic were personally reviewed.    Reports on and off low BG.  Appetite is OK.  Weight: mostly stable.    Has never been on insulin pump.  Using DEXCOM.    1. Type 2 DM:  Orginally diagnosed at the age of: in 30s.  Current Regimen:   Metformin 2000 mg with dinner.  Jardiance 10 mg/day  Semglee 70 units in AM  Humalog 2 unit/10 gm of CHO + ssi 1:50> 140.   (1/2 of correction at beditme)  (about 18-20 units TID)    yes:     Diabetes Medication(s)     Biguanides       metFORMIN (GLUCOPHAGE XR) 500 MG 24 hr tablet    Take 4 tablets (2,000 mg) by mouth daily (with dinner)    Insulin       Insulin Glargine-yfgn (SEMGLEE, YFGN,) 100 UNIT/ML SOPN    Inject 70 Units Subcutaneous daily Call to schedule with new provider Dr Laisha spence     Insulin Lispro (HUMALOG KWIKPEN) 200 UNIT/ML soln    Inject subcu 2 units per 10G CHO, correction scale of 1 units per 50 above 140. You can take 1/2 of this correction at bedtime. Approx 80 units daily.    Sodium-Glucose Co-Transporter 2 (SGLT2) Inhibitors       empagliflozin (JARDIANCE) 10 MG TABS tablet    Take 1 tablet (10 mg) by mouth daily        Victoza-- had GI s/e.    BS checks: DEXCOM  Average Meter Download: Blood glucose data reviewed personally. See nursing note from this encounter for details.  Last A1c: 9.0%  Symptoms of hypoglycemia (low blood sugar):  Gets symptoms of hypoglycemia.    DM Complications:   Complications:   Diabetes Complications  Description / Detail    Diabetic Retinopathy  No   CAD / PAD  No   Neuropathy  No   Nephropathy / Microalbuminuria  No   Gastroparesis  No   Hypoglycemia Unawarness  No       2. Hypertension:    on medications  3. Hyperlipidemia: on medications  Medications     HMG CoA Reductase Inhibitors     simvastatin (ZOCOR) 20 MG tablet             PMH/PSH:  Past Medical History:   Diagnosis Date     Diabetes (H)      Hypertension      Obese      Past Surgical History:   Procedure Laterality Date      URETHROPLASTY STAGE ONE WITH BUCCAL GRAFT N/A 5/2/2018    Procedure: URETHROPLASTY STAGE ONE WITH BUCCAL GRAFT;  Posterior Urethroplasty with Single Buccal Mucosa Graft;  Surgeon: Herb Awad MD;  Location: [facility] OR     Family Hx:  Family History   Problem Relation Age of Onset     Cancer Mother         Gynecologic     Thyroid Disease Mother      No Known Problems Father      Cancer Maternal Grandmother      Diabetes Paternal Grandmother      Mental Illness Paternal Grandmother      Colon Cancer Cousin         Approximately late 30s       Diabetes: maternal aunt, p.grandmother.    Social Hx:  Social History     Socioeconomic History     Marital status:      Spouse name: Not on file     Number of children: Not on file     Years of education: Not on file     Highest education level: Not on file   Occupational History     Not on file   Tobacco Use     Smoking status: Never     Smokeless tobacco: Never   Vaping Use     Vaping Use: Never used   Substance and Sexual Activity     Alcohol use: Yes     Comment: 2-3/month     Drug use: No     Sexual activity: Yes     Partners: Female   Other Topics Concern     Not on file   Social History Narrative     Not on file     Social Determinants of Health     Financial Resource Strain: Not on file   Food Insecurity: Not on file   Transportation Needs: Not on file   Physical Activity: Not on file   Stress: Not on file   Social Connections: Not on file   Intimate Partner Violence: Not on file   Housing Stability: Not on file          MEDICATIONS:  has a current medication list which includes the following prescription(s): metformin, blood glucose, blood glucose monitoring, blood glucose monitoring, bupropion, dexcom g6 sensor, dexcom g6 transmitter, empagliflozin, insulin glargine-yfgn, humalog kwikpen, bd maty u/f, lisinopril, loratadine, multiple vitamins-minerals, simvastatin, and tadalafil.    ROS     ROS: 10 point ROS neg other than the symptoms noted above in the  HPI.    Physical Exam   VS: There were no vitals taken for this visit.  GENERAL: healthy, alert and no distress  EYES: Eyes grossly normal to inspection, conjunctivae and sclerae normal  ENT: no nose swelling, nasal discharge.  Thyroid: no apparent thyroid nodules  RESP: no audible wheeze, cough, or visible cyanosis.  No visible retractions or increased work of breathing.  Able to speak fully in complete sentences.  ABDO: not evaluated.  EXTREMITIES: no hand tremors.  NEURO: Cranial nerves grossly intact, mentation intact and speech normal  SKIN: No apparent skin lesions, rash or edema seen   PSYCH: mentation appears normal, affect normal/bright, judgement and insight intact, normal speech and appearance well-groomed    LABS:  A1c:  Lab Results   Component Value Date    A1C 9.0 03/02/2022    A1C 8.3 11/04/2021    A1C 8.4 04/28/2020    A1C 7.7 01/10/2019    A1C 7.2 08/07/2018    A1C 7.5 04/27/2018       Creatinine:  Creatinine   Date Value Ref Range Status   03/02/2022 0.80 0.66 - 1.25 mg/dL Final   04/28/2020 0.65 (L) 0.66 - 1.25 mg/dL Final   ]    Urine Micro:  Lab Results   Component Value Date    MICROL <5 03/02/2022    MICROL <5 04/28/2020     No results found for: MICROALBUMIN      LFTs/Lipids:  Lab Results   Component Value Date    CHOL 142 03/02/2022     Lab Results   Component Value Date    HDL 58 03/02/2022     Lab Results   Component Value Date    LDL 60 03/02/2022     Lab Results   Component Value Date    TRIG 120 03/02/2022     No results found for: CHOLHDLRATIO    TFTs:  TSH   Date Value Ref Range Status   03/02/2022 2.20 0.40 - 4.00 mU/L Final   ]        All pertinent notes, labs, and images personally reviewed by me.     Glucometer/ insulin pump (if applicable)/ CGM data (if applicable) downloaded, Personally reviewed and interpreted.  All Blood sugar data reviewed personally and discussed with pt.  See nursing note from 2/20/2023 for details of BG/CGM log.      A/P  Mr.Ryan PHONG Boyle is a 42 year old  here for the evaluation/management of diabetes:    1. DM2 - Under poor control.  A1c 9.0%  No know complications.  BG high during day.  Overnight BG- few low BG noted.  Plan:  Discussed diagnosis, pathophysiology, management and treatment options of condition with pt.  I also discussed importance of strict blood sugar control to prevent complications associated with uncontrolled diabetes.  Continue metformin and Humalog at current dose.  Increase Jardiance to 25 mg/day.  Decrease Semglee to 65 units/day.  Decrease carbs with meals.  Continue to use DEXCOM.  Consider insulin pump- MEDTRONIC, OMNIPOD and Tandem.  Labs in 3/2023 with PCP.  Consider GLP1-RA in future. (noted to have GI s/e on Victoza)  Follow up with endocrinology in 3 months with labs prior.  Please make a lab appointment for blood work and follow up clinic appointment in 1 week after that to discuss results.    2. Hypertension - Under Good control.  On lisinopril.    3. Hyperlipidemia - On simvastatin 20 mg/day. LDL 60    4. Prevention:  Opthalmology- recommend annually  ASA-not indicated.  Smoking- No    Most Recent Immunizations   Administered Date(s) Administered     COVID-19 Vaccine ([employer]) 12/28/2021     COVID-19 Vaccine 12+ (Pfizer) 12/28/2021     COVID-19 Vaccine Bivalent Booster 18+ (Moderna) 10/19/2022     Hep B, Peds or Adolescent 04/12/1999     HepA-ped 2 Dose 02/21/2000     HepB-Adult 09/11/2013     Influenza (IIV3) PF 09/11/2013     Influenza Vaccine >6 months (Alfuria,Fluzone) 12/22/2022     Influenza Vaccine, 6+MO IM (QUADRIVALENT W/PRESERVATIVES) 11/26/2019     MMR 08/05/1992     Pneumococcal 23 valent 11/17/2008     Td (Adult), Adsorbed 11/10/1995     Tdap (Adacel,Boostrix) 03/20/2013         Recommend checking blood sugars before meals and at bedtime.    If Blood glucose are low more often-> 2-3 times/week- give us a call.  The patient is advised to Make better food choices: reduce carbs, Reduce portion size, weight loss and  exercise 3-4 times a week.  Discussed hypoglycemia signs and symptoms as well as management in detail.    There is some variability among people, most will usually develop symptoms suggestive of hypoglycemia when blood glucose levels are lowered to the mid 60's. The first set of symptoms are called adrenergic. Patients may experience any of the following nervousness, sweating, intense hunger, trembling, weakness, palpitations, and difficulty speaking.   The acute management of hypoglycemia involves the rapid delivery of a source of easily absorbed sugar. Regular soda, juice, lifesavers, table sugar, are good options. 15 grams of glucose is the dose that is given, followed by an assessment of symptoms and a blood glucose check if possible. If after 10 minutes there is no improvement, another 10-15 grams should be given. This can be repeated up to three times. The equivalency of 10-15 grams of glucose (approximate servings) are: Four lifesavers, 4 teaspoons of sugar, or 1/2 can of regular soda or juice.     All questions were answered.  The patient indicates understanding of the above issues and agrees with the plan set forth.     Follow-up:  3 months.    Nayana Salinas M.D  Endocrinology  [facility]/[place]  CC: Damien Contreras    Disclaimer: This note consists of symbols derived from keyboarding, dictation and/or voice recognition software. As a result, there may be errors in the script that have gone undetected. Please consider this when interpreting information found in this chart.    Addendum to above note and clinic visit:    Labs reviewed.    See result note/telephone encounter.                Again, thank you for allowing me to participate in the care of your patient.        Sincerely,        Nayana Salinas MD

## 2023-02-20 NOTE — NURSING NOTE
Is the patient currently in the state of [place]? YES    Visit mode:VIDEO    If the visit is dropped, the patient can be reconnected by: VIDEO VISIT: Text to cell phone: [phone]    Will anyone else be joining the visit? NO      How would you like to obtain your AVS? MyChart    Are changes needed to the allergy or medication list? NO    Comments or concerns regarding today's visit: NONE

## 2023-02-20 NOTE — PATIENT INSTRUCTIONS
[facility]  Dr Salinas, Endocrinology Department    [facility]   [address] [facility]  [place] [address]  Appointment [phone]  Fax: [phone]  [place]: Monday - Thursday      To provide the best diabetic care, please bring your blood glucose meter to each and every visit with your Endocrinologist.  Your blood glucose meter/insulin pump will be downloaded at every appointment.    Please arrive 15 minutes before your scheduled appointment.  This will allow for your blood glucose meter/insulin pump to be downloaded.  If you are wearing DEXCOM please bring  or sharing code so that it can be downloaded.  If you are using freestyle aleshia personal sensors please bring the reader.  If you are using TANDEM insulin pump please have your username and password to get info from Tandem website.    Continue metformin and Humalog at current dose.  Increase Jardiance to 25 mg/day.  Decrease Semglee to 65 units/day.  Decrease carbs with meals.  Continue to use DEXCOM.  Consider insulin pump- MEDTRONIC, OMNIPOD and Tandem.  Labs in 3/2023 with PCP.  Follow up with endocrinology in 3 months with labs prior.  Please make a lab appointment for blood work and follow up clinic appointment in 1 week after that to discuss results.    Byetta/exenatide (twice a day) , Buydureon/Exenatide (once a week), Ozempic/Semaglutide (once a week), Trulicity/Dulaglutide (once a week), Victoza/Liraglutide (once a day), Adlyxin/Lixisenatide (once a day), Rybelsus/ Semaglutide (oral tablets)- these are alternatives. Please check cost with insurance.  Linnette.    Possible side effect profile of these class of medication includes: Nausea, diarrhea, gastrointestinal intolerance, abdominal pain, upper respiratory tract infection, headache, increased heart rate, drop in blood sugar, injection site reaction.  Rare side effects include pancreatitis, kidney problems.  It has been associated  with thyroid cancer in animal models.    Recommend checking blood sugars before meals and at bedtime.    If Blood glucose are low more often-> 2-3 times/week- give us a call.  Make better food choices: reduce carbs, Reduce portion size, weight loss and exercise 3-4 times a week.    What is hypoglycemia:  Hypoglycemia is when blood sugar levels become too low - below 70 m/dl.      What causes hypoglycemia?  - using too much insulin  -taking too many diabetes pills  -not eating enough, or skipping meals or snacks  -not eating enough carbohydrate with meals  -changing your exercise routine  -drinking alcohol in excess    It is also possible to have hypoglycemia even when you are carefully managing your blood sugar levels.    What does it feel like when blood sugars get too low?  You may feel:  - anxious  -confused  -dizzy  -hungry  -light-headed  -nervous  -shaky  -sleepy  -sweaty    You may have  -blurred or cloudy vision  -heart palpitations (heart skips a beat or races)  -tingling or numbness around the mouth and tongue  -tremors    What to do if you have symptoms of hypoglycmemia:  If you think your blood sugar is too low, check it with a glucose meter.  If its below 70 mg/dl, consume one of the following:  Fruit juice (1/2 cup)  Glucose tablets (15 grams)  Hard candy (5 to 7 pieces)  Honey or sugar (2 teaspoons)  Milk (1/2 cup)  Soft drink (non-diet, 1/2 cup)    Wait 15 minutes and check your blood glucose again.  IF it is still below 70 mg/dl, have another food item listed above. Wait another 15 minutes and repeat the blood glucose test.  Have a small meal or snack that contains some carbohydrate after your blood glucose rises above 70 mg/dl.    If you are at risk of hypoglycemia, always carry with you glucose tablets or one of the foods listed above.      To prevent Hypoglycemia:  Avoid situations that may cause hypoglycemia  Before making any change to your diet or exercise routine, discuss them with your  healthcare provider  Keep a record of your blood glucose levels.  Include the time of day, diabetes medications, when you had your last meal or snack, and what you were doing at the time (e.g. Watching TV, gardening, jogging, etc).    Talk to your healthcare provider if your blood glucose levels are often low        Patient guide on hypoglycemia    http://[email or web address].org/Resources/upload/patient-guide-diagnosis-and-management-hypoglycemia-[email or web address].pdf

## 2023-03-21 DIAGNOSIS — E11.9 TYPE 2 DIABETES MELLITUS WITHOUT COMPLICATION, UNSPECIFIED WHETHER LONG TERM INSULIN USE (H): Primary | ICD-10-CM

## 2023-03-23 RX ORDER — PROCHLORPERAZINE 25 MG/1
SUPPOSITORY RECTAL
Qty: 1 EACH | Refills: 1 | Status: SHIPPED | OUTPATIENT
Start: 2023-03-23 | End: 2023-09-25

## 2023-03-23 NOTE — TELEPHONE ENCOUNTER
"Requested Prescriptions   Pending Prescriptions Disp Refills     Continuous Blood Gluc Transmit (DEXCOM G6 TRANSMITTER) MISC [Pharmacy Med Name: DEXCOM G6 TRANSMITTER  MISC] 1 each 3     Sig: CHANGE EVERY 90 DAYS       Diabetic Supplies Protocol Failed - 3/21/2023  9:45 AM        Failed - Recent (6 mo) or future (30 days) visit within the authorizing provider's specialty     Patient had office visit in the last 6 months or has a visit in the next 30 days with authorizing provider.  See \"Patient Info\" tab in inbasket, or \"Choose Columns\" in Meds & Orders section of the refill encounter.            Passed - Medication is active on med list        Passed - Patient is 18 years of age or older           Pt had visit 2/20/23 with Dr. Salinas.  "

## 2023-03-24 ENCOUNTER — TELEPHONE (OUTPATIENT)
Dept: ENDOCRINOLOGY | Facility: CLINIC | Age: 43
End: 2023-03-24
Payer: COMMERCIAL

## 2023-03-24 DIAGNOSIS — E11.65 TYPE 2 DIABETES MELLITUS WITH HYPERGLYCEMIA, UNSPECIFIED WHETHER LONG TERM INSULIN USE (H): ICD-10-CM

## 2023-03-27 RX ORDER — PROCHLORPERAZINE 25 MG/1
SUPPOSITORY RECTAL
Qty: 9 EACH | Refills: 0 | Status: SHIPPED | OUTPATIENT
Start: 2023-03-27 | End: 2023-06-22

## 2023-04-04 DIAGNOSIS — E66.01 MORBID OBESITY (H): ICD-10-CM

## 2023-04-04 DIAGNOSIS — E11.9 TYPE 2 DIABETES MELLITUS WITHOUT COMPLICATION, UNSPECIFIED WHETHER LONG TERM INSULIN USE (H): ICD-10-CM

## 2023-04-04 NOTE — TELEPHONE ENCOUNTER
"Requested Prescriptions   Pending Prescriptions Disp Refills     Insulin Lispro (HUMALOG KWIKPEN) 200 UNIT/ML soln [Pharmacy Med Name: HUMALOG 200 U/ML KWIKPEN INJ 3ML] 15 mL 0     Sig: INJECT 2 UNITS/10 GRAMS CARBS UNDER THE SKIN AS DIRECTED WITH CORRECTION 1 UNIT/50 ABOVE 140. TAKE 1/2 CORRECTION AT BEDTIME. ABOUT 80 UNITS DAILY       Short Acting Insulin Protocol Failed - 4/4/2023  8:02 AM        Failed - Serum creatinine on file in past 12 months     Recent Labs   Lab Test 03/02/22  0848   CR 0.80       Ok to refill medication if creatinine is low          Failed - HgbA1C in past 3 or 6 months     If HgbA1C is 8 or greater, it needs to be on file within the past 3 months.  If less than 8, must be on file within the past 6 months.     Recent Labs   Lab Test 03/02/22  0848 04/28/20  0904 09/23/19  0000   A1C 9.0*   < >  --    HEMOGLOBINA1  --   --  7.5*    < > = values in this interval not displayed.             Failed - Recent (6 mo) or future (30 days) visit within the authorizing provider's specialty     Patient had office visit in the last 6 months or has a visit in the next 30 days with authorizing provider or within the authorizing provider's specialty.  See \"Patient Info\" tab in inbasket, or \"Choose Columns\" in Meds & Orders section of the refill encounter.            Passed - Medication is active on med list        Passed - Patient is age 18 or older             "

## 2023-04-06 RX ORDER — INSULIN LISPRO 200 [IU]/ML
INJECTION, SOLUTION SUBCUTANEOUS
Qty: 15 ML | Refills: 0 | OUTPATIENT
Start: 2023-04-06

## 2023-04-06 NOTE — TELEPHONE ENCOUNTER
Ok to send limited Rx to bridge till be next appointment.  If there is no appointment made then please help schedule.  Please place A1c and urine microalbumin to be done prior to next appointment.    Thank you.    Nayana Salinas MD    Lab Results   Component Value Date    A1C 9.0 03/02/2022    A1C 8.3 11/04/2021    A1C 8.4 04/28/2020    A1C 7.7 01/10/2019    A1C 7.2 08/07/2018    A1C 7.5 04/27/2018     Creatinine   Date Value Ref Range Status   03/02/2022 0.80 0.66 - 1.25 mg/dL Final   04/28/2020 0.65 (L) 0.66 - 1.25 mg/dL Final     Lab Results   Component Value Date    UMALCR  03/02/2022      Comment:      Unable to calculate:  Urine creatinine or albumin value below detectable level    UMALCR Unable to calculate due to low value 04/28/2020

## 2023-04-18 ENCOUNTER — LAB (OUTPATIENT)
Dept: LAB | Facility: CLINIC | Age: 43
End: 2023-04-18
Payer: COMMERCIAL

## 2023-04-18 DIAGNOSIS — E11.65 TYPE 2 DIABETES MELLITUS WITH HYPERGLYCEMIA, WITH LONG-TERM CURRENT USE OF INSULIN (H): ICD-10-CM

## 2023-04-18 DIAGNOSIS — Z79.4 TYPE 2 DIABETES MELLITUS WITH HYPERGLYCEMIA, WITH LONG-TERM CURRENT USE OF INSULIN (H): ICD-10-CM

## 2023-04-18 DIAGNOSIS — Z79.4 LONG-TERM INSULIN USE (H): ICD-10-CM

## 2023-04-18 LAB — HBA1C MFR BLD: 7.1 % (ref 0–5.6)

## 2023-04-18 PROCEDURE — 83036 HEMOGLOBIN GLYCOSYLATED A1C: CPT

## 2023-04-18 PROCEDURE — 36415 COLL VENOUS BLD VENIPUNCTURE: CPT

## 2023-04-24 ASSESSMENT — ENCOUNTER SYMPTOMS
FEVER: 0
DIZZINESS: 0
DYSURIA: 0
HEARTBURN: 0
FREQUENCY: 0
CONSTIPATION: 0
HEADACHES: 0
PARESTHESIAS: 0
HEMATOCHEZIA: 0
ARTHRALGIAS: 0
WEAKNESS: 0
ABDOMINAL PAIN: 0
CHILLS: 0
NERVOUS/ANXIOUS: 0
HEMATURIA: 0
DIARRHEA: 0
NAUSEA: 0
COUGH: 0
PALPITATIONS: 0
SHORTNESS OF BREATH: 0
JOINT SWELLING: 0
MYALGIAS: 0
EYE PAIN: 0
SORE THROAT: 0

## 2023-04-26 ENCOUNTER — OFFICE VISIT (OUTPATIENT)
Dept: INTERNAL MEDICINE | Facility: CLINIC | Age: 43
End: 2023-04-26
Payer: COMMERCIAL

## 2023-04-26 VITALS
RESPIRATION RATE: 18 BRPM | HEIGHT: 70 IN | HEART RATE: 102 BPM | SYSTOLIC BLOOD PRESSURE: 124 MMHG | WEIGHT: 264 LBS | DIASTOLIC BLOOD PRESSURE: 82 MMHG | OXYGEN SATURATION: 99 % | BODY MASS INDEX: 37.8 KG/M2 | TEMPERATURE: 97.4 F

## 2023-04-26 DIAGNOSIS — I10 PRIMARY HYPERTENSION: ICD-10-CM

## 2023-04-26 DIAGNOSIS — N48.1 BALANITIS: ICD-10-CM

## 2023-04-26 DIAGNOSIS — E66.01 MORBID OBESITY (H): ICD-10-CM

## 2023-04-26 DIAGNOSIS — Z00.00 ENCOUNTER FOR PREVENTATIVE ADULT HEALTH CARE EXAMINATION: Primary | ICD-10-CM

## 2023-04-26 DIAGNOSIS — E11.9 TYPE 2 DIABETES MELLITUS WITHOUT COMPLICATION, UNSPECIFIED WHETHER LONG TERM INSULIN USE (H): ICD-10-CM

## 2023-04-26 DIAGNOSIS — R20.2 PARESTHESIA: ICD-10-CM

## 2023-04-26 LAB
ALBUMIN SERPL BCG-MCNC: 4.5 G/DL (ref 3.5–5.2)
ALP SERPL-CCNC: 88 U/L (ref 40–129)
ALT SERPL W P-5'-P-CCNC: 13 U/L (ref 10–50)
ANION GAP SERPL CALCULATED.3IONS-SCNC: 12 MMOL/L (ref 7–15)
AST SERPL W P-5'-P-CCNC: 22 U/L (ref 10–50)
BILIRUB SERPL-MCNC: 0.3 MG/DL
BUN SERPL-MCNC: 22.6 MG/DL (ref 6–20)
CALCIUM SERPL-MCNC: 9.7 MG/DL (ref 8.6–10)
CHLORIDE SERPL-SCNC: 103 MMOL/L (ref 98–107)
CHOLEST SERPL-MCNC: 127 MG/DL
CREAT SERPL-MCNC: 0.97 MG/DL (ref 0.67–1.17)
CREAT UR-MCNC: 99.5 MG/DL
DEPRECATED HCO3 PLAS-SCNC: 24 MMOL/L (ref 22–29)
ERYTHROCYTE [DISTWIDTH] IN BLOOD BY AUTOMATED COUNT: 13.9 % (ref 10–15)
GFR SERPL CREATININE-BSD FRML MDRD: >90 ML/MIN/1.73M2
GLUCOSE SERPL-MCNC: 114 MG/DL (ref 70–99)
HCT VFR BLD AUTO: 46.2 % (ref 40–53)
HDLC SERPL-MCNC: 49 MG/DL
HGB BLD-MCNC: 15.2 G/DL (ref 13.3–17.7)
LDLC SERPL CALC-MCNC: 59 MG/DL
MCH RBC QN AUTO: 28.6 PG (ref 26.5–33)
MCHC RBC AUTO-ENTMCNC: 32.9 G/DL (ref 31.5–36.5)
MCV RBC AUTO: 87 FL (ref 78–100)
MICROALBUMIN UR-MCNC: <12 MG/L
MICROALBUMIN/CREAT UR: NORMAL MG/G{CREAT}
NONHDLC SERPL-MCNC: 78 MG/DL
PLATELET # BLD AUTO: 274 10E3/UL (ref 150–450)
POTASSIUM SERPL-SCNC: 4.9 MMOL/L (ref 3.4–5.3)
PROT SERPL-MCNC: 7.4 G/DL (ref 6.4–8.3)
RBC # BLD AUTO: 5.31 10E6/UL (ref 4.4–5.9)
SODIUM SERPL-SCNC: 139 MMOL/L (ref 136–145)
TRIGL SERPL-MCNC: 93 MG/DL
TSH SERPL DL<=0.005 MIU/L-ACNC: 2.83 UIU/ML (ref 0.3–4.2)
VIT B12 SERPL-MCNC: 766 PG/ML (ref 232–1245)
WBC # BLD AUTO: 7.5 10E3/UL (ref 4–11)

## 2023-04-26 PROCEDURE — 82607 VITAMIN B-12: CPT | Performed by: INTERNAL MEDICINE

## 2023-04-26 PROCEDURE — 82570 ASSAY OF URINE CREATININE: CPT | Performed by: INTERNAL MEDICINE

## 2023-04-26 PROCEDURE — 82043 UR ALBUMIN QUANTITATIVE: CPT | Performed by: INTERNAL MEDICINE

## 2023-04-26 PROCEDURE — 80053 COMPREHEN METABOLIC PANEL: CPT | Performed by: INTERNAL MEDICINE

## 2023-04-26 PROCEDURE — 84443 ASSAY THYROID STIM HORMONE: CPT | Performed by: INTERNAL MEDICINE

## 2023-04-26 PROCEDURE — 90471 IMMUNIZATION ADMIN: CPT | Performed by: INTERNAL MEDICINE

## 2023-04-26 PROCEDURE — 99214 OFFICE O/P EST MOD 30 MIN: CPT | Mod: 25 | Performed by: INTERNAL MEDICINE

## 2023-04-26 PROCEDURE — 85027 COMPLETE CBC AUTOMATED: CPT | Performed by: INTERNAL MEDICINE

## 2023-04-26 PROCEDURE — 90715 TDAP VACCINE 7 YRS/> IM: CPT | Performed by: INTERNAL MEDICINE

## 2023-04-26 PROCEDURE — 99396 PREV VISIT EST AGE 40-64: CPT | Mod: 25 | Performed by: INTERNAL MEDICINE

## 2023-04-26 PROCEDURE — 80061 LIPID PANEL: CPT | Performed by: INTERNAL MEDICINE

## 2023-04-26 PROCEDURE — 36415 COLL VENOUS BLD VENIPUNCTURE: CPT | Performed by: INTERNAL MEDICINE

## 2023-04-26 RX ORDER — NYSTATIN 100000/ML
200000 SUSPENSION, ORAL (FINAL DOSE FORM) ORAL 2 TIMES DAILY
Qty: 60 ML | Refills: 0 | Status: SHIPPED | OUTPATIENT
Start: 2023-04-26

## 2023-04-26 ASSESSMENT — ENCOUNTER SYMPTOMS
CONSTIPATION: 0
PALPITATIONS: 0
HEADACHES: 0
PARESTHESIAS: 0
DIZZINESS: 0
WEAKNESS: 0
EYE PAIN: 0
FREQUENCY: 0
ABDOMINAL PAIN: 0
NAUSEA: 0
MYALGIAS: 0
SORE THROAT: 0
DIARRHEA: 0
DYSURIA: 0
HEMATURIA: 0
HEMATOCHEZIA: 0
ARTHRALGIAS: 0
NERVOUS/ANXIOUS: 0
JOINT SWELLING: 0
COUGH: 0
SHORTNESS OF BREATH: 0
CHILLS: 0
HEARTBURN: 0
FEVER: 0

## 2023-04-26 ASSESSMENT — PAIN SCALES - GENERAL: PAINLEVEL: NO PAIN (0)

## 2023-04-26 NOTE — LETTER
April 28, 2023      Clarence Boyle  [address]  [facility] [address]        Dear ,    We are writing to inform you of your test results.    Your results are within normal limits.    Resulted Orders   Lipid panel reflex to direct LDL Non-fasting   Result Value Ref Range    Cholesterol 127 <200 mg/dL    Triglycerides 93 <150 mg/dL    Direct Measure HDL 49 >=40 mg/dL    LDL Cholesterol Calculated 59 <=100 mg/dL    Non HDL Cholesterol 78 <130 mg/dL    Narrative    Cholesterol  Desirable:  <200 mg/dL    Triglycerides  Normal:  Less than 150 mg/dL  Borderline High:  150-199 mg/dL  High:  200-499 mg/dL  Very High:  Greater than or equal to 500 mg/dL    Direct Measure HDL  Female:  Greater than or equal to 50 mg/dL   Male:  Greater than or equal to 40 mg/dL    LDL Cholesterol  Desirable:  <100mg/dL  Above Desirable:  100-129 mg/dL   Borderline High:  130-159 mg/dL   High:  160-189 mg/dL   Very High:  >= 190 mg/dL    Non HDL Cholesterol  Desirable:  130 mg/dL  Above Desirable:  130-159 mg/dL  Borderline High:  160-189 mg/dL  High:  190-219 mg/dL  Very High:  Greater than or equal to 220 mg/dL   Albumin Random Urine Quantitative with Creat Ratio   Result Value Ref Range    Creatinine Urine mg/dL 99.5 mg/dL      Comment:      The reference ranges have not been established in urine creatinine. The results should be integrated into the clinical context for interpretation.    Albumin Urine mg/L <12.0 mg/L      Comment:      The reference ranges have not been established in urine albumin. The results should be integrated into the clinical context for interpretation.    Albumin Urine mg/g Cr        Comment:      Unable to calculate, urine albumin and/or urine creatinine is outside detectable limits.  Microalbuminuria is defined as an albumin:creatinine ratio of 17 to 299 for males and 25 to 299 for females. A ratio of albumin:creatinine of 300 or higher is indicative of overt proteinuria.  Due to biologic variability,  positive results should be confirmed by a second, first-morning random or 24-hour timed urine specimen. If there is discrepancy, a third specimen is recommended. When 2 out of 3 results are in the microalbuminuria range, this is evidence for incipient nephropathy and warrants increased efforts at glucose control, blood pressure control, and institution of therapy with an angiotensin-converting-enzyme (ACE) inhibitor (if the patient can tolerate it).     Vitamin B12   Result Value Ref Range    Vitamin B12 766 232 - 1,245 pg/mL   CBC with platelets   Result Value Ref Range    WBC Count 7.5 4.0 - 11.0 10e3/uL    RBC Count 5.31 4.40 - 5.90 10e6/uL    Hemoglobin 15.2 13.3 - 17.7 g/dL    Hematocrit 46.2 40.0 - 53.0 %    MCV 87 78 - 100 fL    MCH 28.6 26.5 - 33.0 pg    MCHC 32.9 31.5 - 36.5 g/dL    RDW 13.9 10.0 - 15.0 %    Platelet Count 274 150 - 450 10e3/uL   Comprehensive metabolic panel (BMP + Alb, Alk Phos, ALT, AST, Total. Bili, TP)   Result Value Ref Range    Sodium 139 136 - 145 mmol/L    Potassium 4.9 3.4 - 5.3 mmol/L    Chloride 103 98 - 107 mmol/L    Carbon Dioxide (CO2) 24 22 - 29 mmol/L    Anion Gap 12 7 - 15 mmol/L    Urea Nitrogen 22.6 (H) 6.0 - 20.0 mg/dL    Creatinine 0.97 0.67 - 1.17 mg/dL    Calcium 9.7 8.6 - 10.0 mg/dL    Glucose 114 (H) 70 - 99 mg/dL    Alkaline Phosphatase 88 40 - 129 U/L    AST 22 10 - 50 U/L    ALT 13 10 - 50 U/L    Protein Total 7.4 6.4 - 8.3 g/dL    Albumin 4.5 3.5 - 5.2 g/dL    Bilirubin Total 0.3 <=1.2 mg/dL    GFR Estimate >90 >60 mL/min/1.73m2      Comment:      eGFR calculated using 2021 CKD-EPI equation.   TSH with free T4 reflex   Result Value Ref Range    TSH 2.83 0.30 - 4.20 uIU/mL       If you have any questions or concerns, please call the clinic at the number listed above.       Sincerely,      Damien Contreras MD

## 2023-04-26 NOTE — PROGRESS NOTES
SUBJECTIVE:   CC: Clarence is an 43 year old who presents for preventative health visit.        View : No data to display.      Roomed by : Ynes Ferraro 04/26/2023 8:10 AM        Patient has been advised of split billing requirements and indicates understanding: Yes  Healthy Habits:     Getting at least 3 servings of Calcium per day:  Yes    Bi-annual eye exam:  Yes    Dental care twice a year:  Yes    Sleep apnea or symptoms of sleep apnea:  None    Diet:  Diabetic    Frequency of exercise:  1 day/week    Duration of exercise:  Less than 15 minutes    Taking medications regularly:  Yes    Medication side effects:  Not applicable    PHQ-2 Total Score: 0    Additional concerns today:  No    Ability to successfully perform activities of daily living: Yes, no assistance needed  Home safety:  none identified   Hearing impairment: No        Diabetes Follow-up    How often are you checking your blood sugar? Continuous glucose monitor  What time of day are you checking your blood sugars (select all that apply)?  Before and after meals  Have you had any blood sugars above 200?  Yes   Have you had any blood sugars below 70?  Yes     What symptoms do you notice when your blood sugar is low?  Shaky and LIGHT HEADED    What concerns do you have today about your diabetes? None     Do you have any of these symptoms? (Select all that apply)  No numbness or tingling in feet.  No redness, sores or blisters on feet.  No complaints of excessive thirst.  No reports of blurry vision.  No significant changes to weight.    Have you had a diabetic eye exam in the last 12 months? No            Hyperlipidemia Follow-Up      Are you regularly taking any medication or supplement to lower your cholesterol?   Yes- simvastatin    Are you having muscle aches or other side effects that you think could be caused by your cholesterol lowering medication?  No    Hypertension Follow-up      Do you check your blood pressure regularly outside of the clinic? No      Are you following a low salt diet? No    Are your blood pressures ever more than 140 on the top number (systolic) OR more   than 90 on the bottom number (diastolic), for example 140/90? Yes    BP Readings from Last 2 Encounters:   05/12/22 124/86   03/02/22 118/82     Hemoglobin A1C (%)   Date Value   04/18/2023 7.1 (H)   03/02/2022 9.0 (H)   04/28/2020 8.4 (H)     LDL Cholesterol Calculated (mg/dL)   Date Value   03/02/2022 60   03/15/2019 53         Today's PHQ-2 Score:       4/24/2023    12:40 PM   PHQ-2 ( 1999 [employer])   Q1: Little interest or pleasure in doing things 0   Q2: Feeling down, depressed or hopeless 0   PHQ-2 Score 0   Q1: Little interest or pleasure in doing things Not at all   Q2: Feeling down, depressed or hopeless Not at all   PHQ-2 Score 0           Social History     Tobacco Use     Smoking status: Never     Smokeless tobacco: Never   Vaping Use     Vaping status: Never Used     Passive vaping exposure: Yes   Substance Use Topics     Alcohol use: Yes     Comment: 2-3/month             4/24/2023    12:40 PM   Alcohol Use   Prescreen: >3 drinks/day or >7 drinks/week? No       Last PSA: No results found for: PSA    Reviewed orders with patient. Reviewed health maintenance and updated orders accordingly - Yes  Lab work is in process  Labs reviewed in EPIC    Reviewed and updated as needed this visit by clinical staff                  Reviewed and updated as needed this visit by Provider                 Has H/O DM. On diet , exercise and insulin and oral treatment. Blood sugars are controlled. No parestesias. No hypoglycemias.  Has h/o HTN. on medical treatment. BP has been controlled. No side effects from medications. No CP, HA, dizziness. good compliance with medications and low salt diet.  Has H/O hyperlipidemia. On medical treatment and diet. No side effects. No muscle weakness, myalgias or upset stomach.   Concern for numbness of the arms with change of position , more at night. Resolves  with moving in different position.   Has had dryness of the head of the penis, developing of irritation, cracking of the skin. Uses moisturizer.     Review of Systems   Constitutional: Negative for chills and fever.   HENT: Negative for congestion, ear pain, hearing loss and sore throat.    Eyes: Negative for pain and visual disturbance.   Respiratory: Negative for cough and shortness of breath.    Cardiovascular: Negative for chest pain, palpitations and peripheral edema.   Gastrointestinal: Negative for abdominal pain, constipation, diarrhea, heartburn, hematochezia and nausea.   Genitourinary: Positive for impotence. Negative for dysuria, frequency, genital sores, hematuria, penile discharge and urgency.   Musculoskeletal: Negative for arthralgias, joint swelling and myalgias.   Skin: Negative for rash.   Neurological: Negative for dizziness, weakness, headaches and paresthesias.   Psychiatric/Behavioral: Negative for mood changes. The patient is not nervous/anxious.          OBJECTIVE:   There were no vitals taken for this visit.    Physical Exam  GENERAL: healthy, alert and no distress  EYES: Eyes grossly normal to inspection, PERRL and conjunctivae and sclerae normal  HENT: ear canals and TM's normal, nose and mouth without ulcers or lesions  NECK: no adenopathy, no asymmetry, masses, or scars and thyroid normal to palpation  RESP: lungs clear to auscultation - no rales, rhonchi or wheezes  CV: regular rate and rhythm, normal S1 S2, no S3 or S4, no murmur, click or rub, no peripheral edema and peripheral pulses strong  ABDOMEN: soft, nontender, no hepatosplenomegaly, no masses and bowel sounds normal   (male): normal male genitalia without lesions or urethral discharge, no hernia, skin behind the penis head is dry and with whitish secretion   MS: no gross musculoskeletal defects noted, no edema  SKIN: no suspicious lesions or rashes  NEURO: Normal strength and tone, mentation intact and speech normal  PSYCH:  mentation appears normal, affect normal/bright    Diagnostic Test Results:  Labs reviewed in Epic    ASSESSMENT/PLAN:       ICD-10-CM    1. Encounter for preventative adult health care examination  Z00.00       2. Primary hypertension  I10 Lipid panel reflex to direct LDL Non-fasting     Albumin Random Urine Quantitative with Creat Ratio     CBC with platelets     Comprehensive metabolic panel (BMP + Alb, Alk Phos, ALT, AST, Total. Bili, TP)     TSH with free T4 reflex     OFFICE/OUTPT VISIT,EST,LEVL III      3. Type 2 diabetes mellitus without complication, unspecified whether long term insulin use (H)  E11.9 Lipid panel reflex to direct LDL Non-fasting     Albumin Random Urine Quantitative with Creat Ratio     OFFICE/OUTPT VISIT,EST,LEVL III      4. Morbid obesity (H)  E66.01       5. Balanitis  N48.1 nystatin (MYCOSTATIN) 597237 UNIT/ML suspension     Adult Dermatology Referral     OFFICE/OUTPT VISIT,EST,LEVL III      6. Paresthesia  R20.2 Vitamin B12     OFFICE/OUTPT VISIT,EST,LEVL III        Assess lab work   Continue treatment   Controlled HTN  Assess lipids  Refer to dermatology  Start on antifungal     Patient has been advised of split billing requirements and indicates understanding: Yes      COUNSELING:   Reviewed preventive health counseling, as reflected in patient instructions       Regular exercise       Healthy diet/nutrition       Vision screening       Hearing screening       Colorectal cancer screening       Prostate cancer screening        He reports that he has never smoked. He has never used smokeless tobacco.            Damien Contreras MD  [facility]

## 2023-04-28 ENCOUNTER — MYC MEDICAL ADVICE (OUTPATIENT)
Dept: INTERNAL MEDICINE | Facility: CLINIC | Age: 43
End: 2023-04-28
Payer: COMMERCIAL

## 2023-04-28 DIAGNOSIS — E66.01 MORBID OBESITY (H): ICD-10-CM

## 2023-04-28 DIAGNOSIS — E11.9 TYPE 2 DIABETES MELLITUS WITHOUT COMPLICATION, UNSPECIFIED WHETHER LONG TERM INSULIN USE (H): ICD-10-CM

## 2023-05-01 RX ORDER — LISINOPRIL 10 MG/1
TABLET ORAL
Qty: 90 TABLET | Refills: 3 | Status: SHIPPED | OUTPATIENT
Start: 2023-05-01 | End: 2024-04-30

## 2023-05-01 NOTE — TELEPHONE ENCOUNTER
He is asking for Lisinopril.     Prescription approved per [facility] Refill Protocol.

## 2023-05-03 DIAGNOSIS — E66.01 MORBID OBESITY (H): ICD-10-CM

## 2023-05-03 DIAGNOSIS — E11.9 TYPE 2 DIABETES MELLITUS WITHOUT COMPLICATION, UNSPECIFIED WHETHER LONG TERM INSULIN USE (H): ICD-10-CM

## 2023-05-03 NOTE — TELEPHONE ENCOUNTER
Okay to send 3-month supply but I would recommend to discuss with primary care provider if she needs to be on this medication.  Reason?

## 2023-05-04 RX ORDER — BUPROPION HYDROCHLORIDE 150 MG/1
150 TABLET, EXTENDED RELEASE ORAL 2 TIMES DAILY
Qty: 180 TABLET | Refills: 0 | Status: SHIPPED | OUTPATIENT
Start: 2023-05-04 | End: 2023-08-08

## 2023-05-30 ENCOUNTER — MYC MEDICAL ADVICE (OUTPATIENT)
Dept: INTERNAL MEDICINE | Facility: CLINIC | Age: 43
End: 2023-05-30
Payer: COMMERCIAL

## 2023-05-30 DIAGNOSIS — E66.01 MORBID OBESITY (H): ICD-10-CM

## 2023-05-30 DIAGNOSIS — E11.9 TYPE 2 DIABETES MELLITUS WITHOUT COMPLICATION, UNSPECIFIED WHETHER LONG TERM INSULIN USE (H): ICD-10-CM

## 2023-05-30 NOTE — TELEPHONE ENCOUNTER
Simvastatin      Last Written Prescription Date:  09/27/22  Last Fill Quantity: 90,   # refills: 1  Last Office Visit: 04/26/23  Future Office visit:

## 2023-06-01 RX ORDER — SIMVASTATIN 20 MG
20 TABLET ORAL DAILY
Qty: 90 TABLET | Refills: 1 | Status: SHIPPED | OUTPATIENT
Start: 2023-06-01 | End: 2023-11-08

## 2023-06-01 NOTE — TELEPHONE ENCOUNTER
Routing refill request to provider for review/approval because:  Different prescribing provider

## 2023-06-21 DIAGNOSIS — E11.65 TYPE 2 DIABETES MELLITUS WITH HYPERGLYCEMIA, UNSPECIFIED WHETHER LONG TERM INSULIN USE (H): ICD-10-CM

## 2023-06-21 RX ORDER — PROCHLORPERAZINE 25 MG/1
SUPPOSITORY RECTAL
Refills: 0 | OUTPATIENT
Start: 2023-06-21

## 2023-06-24 DIAGNOSIS — E11.9 TYPE 2 DIABETES MELLITUS WITHOUT COMPLICATION, UNSPECIFIED WHETHER LONG TERM INSULIN USE (H): ICD-10-CM

## 2023-06-24 DIAGNOSIS — E66.01 MORBID OBESITY (H): ICD-10-CM

## 2023-06-26 RX ORDER — INSULIN LISPRO 200 [IU]/ML
INJECTION, SOLUTION SUBCUTANEOUS
Qty: 75 ML | Refills: 0 | Status: SHIPPED | OUTPATIENT
Start: 2023-06-26 | End: 2024-06-21

## 2023-06-26 NOTE — TELEPHONE ENCOUNTER
"Requested Prescriptions   Pending Prescriptions Disp Refills     Insulin Lispro (HUMALOG KWIKPEN) 200 UNIT/ML soln [Pharmacy Med Name: HUMALOG 200 U/ML KWIKPEN INJ 3ML] 30 mL 0     Sig: INJECT 2 UNITS/10 GRAMS CARBS UNDER SKIN AS DIRECTED WITH CORRECTION 1 UNIT/50 ABOVE 140. TAKE 1/2 CORRECTION AT BEDTIME. ABOUT 80 UNITS DAILY       Short Acting Insulin Protocol Passed - 6/24/2023  8:02 AM        Passed - Serum creatinine on file in past 12 months     Recent Labs   Lab Test 04/26/23  0905   CR 0.97       Ok to refill medication if creatinine is low          Passed - HgbA1C in past 3 or 6 months     If HgbA1C is 8 or greater, it needs to be on file within the past 3 months.  If less than 8, must be on file within the past 6 months.     Recent Labs   Lab Test 04/18/23  0813 04/28/20  0904 09/23/19  0000   A1C 7.1*   < >  --    HEMOGLOBINA1  --   --  7.5*    < > = values in this interval not displayed.             Passed - Medication is active on med list        Passed - Patient is age 18 or older        Passed - Recent (6 mo) or future (30 days) visit within the authorizing provider's specialty     Patient had office visit in the last 6 months or has a visit in the next 30 days with authorizing provider or within the authorizing provider's specialty.  See \"Patient Info\" tab in inbasket, or \"Choose Columns\" in Meds & Orders section of the refill encounter.                 "

## 2023-08-01 NOTE — PROGRESS NOTES
Outcome for 08/01/23 11:11 AM: Data uploaded on Dexcom  Amy Hendricks MA  Outcome for 08/04/23 12:01 PM: Data obtained via Dexcom website  Amy Hendricks MA

## 2023-08-08 ENCOUNTER — VIRTUAL VISIT (OUTPATIENT)
Dept: ENDOCRINOLOGY | Facility: CLINIC | Age: 43
End: 2023-08-08
Payer: COMMERCIAL

## 2023-08-08 DIAGNOSIS — E11.65 TYPE 2 DIABETES MELLITUS WITH HYPERGLYCEMIA, WITH LONG-TERM CURRENT USE OF INSULIN (H): Primary | ICD-10-CM

## 2023-08-08 DIAGNOSIS — Z79.4 LONG-TERM INSULIN USE (H): ICD-10-CM

## 2023-08-08 DIAGNOSIS — I10 PRIMARY HYPERTENSION: ICD-10-CM

## 2023-08-08 DIAGNOSIS — E78.5 HYPERLIPIDEMIA, UNSPECIFIED HYPERLIPIDEMIA TYPE: ICD-10-CM

## 2023-08-08 DIAGNOSIS — Z79.4 TYPE 2 DIABETES MELLITUS WITH HYPERGLYCEMIA, WITH LONG-TERM CURRENT USE OF INSULIN (H): Primary | ICD-10-CM

## 2023-08-08 PROCEDURE — 95251 CONT GLUC MNTR ANALYSIS I&R: CPT | Performed by: INTERNAL MEDICINE

## 2023-08-08 PROCEDURE — 99214 OFFICE O/P EST MOD 30 MIN: CPT | Mod: VID | Performed by: INTERNAL MEDICINE

## 2023-08-08 NOTE — PATIENT INSTRUCTIONS
[facility]  Dr Salinas, Endocrinology Department    [facility]   [id number]  [place] [address]  Appointment [phone]  Fax: [phone]  [place]: Monday - Thursday      To provide the best diabetic care, please bring your blood glucose meter to each and every visit with your Endocrinologist.  Your blood glucose meter/insulin pump will be downloaded at every appointment.    Please arrive 15 minutes before your scheduled appointment.  This will allow for your blood glucose meter/insulin pump to be downloaded.  If you are wearing DEXCOM please bring  or sharing code so that it can be downloaded.  If you are using freestyle aleshia personal sensors please bring the reader.  If you are using TANDEM insulin pump please have your username and password to get info from [employer] website.      Decrease Semglee to 54 Units. If having low Blood glucose episodes on this dose decrease further by 2-4 units.  Continue metformin and Humalog at current dose.  Continue Jardiance 25 mg/day.  Decrease carbs with meals.  Continue to use DEXCOM.  Labs needed.  Repeat labs and follow up in 3-4 months.  Please make a lab appointment for blood work and follow up clinic appointment in 1 week after that to discuss results.    Recommend checking blood sugars before meals and at bedtime.    If Blood glucose are low more often-> 2-3 times/week- give us a call.  Make better food choices: reduce carbs, Reduce portion size, weight loss and exercise 3-4 times a week.    What is hypoglycemia:  Hypoglycemia is when blood sugar levels become too low - below 70 m/dl.      What causes hypoglycemia?  - using too much insulin  -taking too many diabetes pills  -not eating enough, or skipping meals or snacks  -not eating enough carbohydrate with meals  -changing your exercise routine  -drinking alcohol in excess    It is also possible to have hypoglycemia even when you are carefully  managing your blood sugar levels.    What does it feel like when blood sugars get too low?  You may feel:  - anxious  -confused  -dizzy  -hungry  -light-headed  -nervous  -shaky  -sleepy  -sweaty    You may have  -blurred or cloudy vision  -heart palpitations (heart skips a beat or races)  -tingling or numbness around the mouth and tongue  -tremors    What to do if you have symptoms of hypoglycmemia:  If you think your blood sugar is too low, check it with a glucose meter.  If its below 70 mg/dl, consume one of the following:  Fruit juice (1/2 cup)  Glucose tablets (15 grams)  Hard candy (5 to 7 pieces)  Honey or sugar (2 teaspoons)  Milk (1/2 cup)  Soft drink (non-diet, 1/2 cup)    Wait 15 minutes and check your blood glucose again.  IF it is still below 70 mg/dl, have another food item listed above. Wait another 15 minutes and repeat the blood glucose test.  Have a small meal or snack that contains some carbohydrate after your blood glucose rises above 70 mg/dl.    If you are at risk of hypoglycemia, always carry with you glucose tablets or one of the foods listed above.      To prevent Hypoglycemia:  Avoid situations that may cause hypoglycemia  Before making any change to your diet or exercise routine, discuss them with your healthcare provider  Keep a record of your blood glucose levels.  Include the time of day, diabetes medications, when you had your last meal or snack, and what you were doing at the time (e.g. Watching TV, gardening, jogging, etc).    Talk to your healthcare provider if your blood glucose levels are often low        Patient guide on hypoglycemia    http://www.hormone.org/Resources/upload/patient-guide-diagnosis-and-management-hypoglycemia-340407.[email or web address]

## 2023-08-08 NOTE — NURSING NOTE
Is the patient currently in the state of [place]? YES    Visit mode:VIDEO    If the visit is dropped, the patient can be reconnected by: VIDEO VISIT: Text to cell phone: [phone]    Will anyone else be joining the visit? NO      How would you like to obtain your AVS? MyChart    Are changes needed to the allergy or medication list? NO Patient reports no changes to allergies/medications since completing e-check in for this visit.    Reason for visit: RECHECK

## 2023-08-08 NOTE — LETTER
"    8/8/2023         RE: Clarence Boyle  [id number]  [facility] [address]        Dear Colleague,    Thank you for referring your patient, Clarence Boyle, to the [facility]. Please see a copy of my visit note below.    Outcome for 08/01/23 11:11 AM: Data uploaded on Dexcom  Amy Hendricks MA  Outcome for 08/04/23 12:01 PM: Data obtained via Dexcom website  Amy Hendricks MA         THIS IS A VIDEO VISIT:    Phone call visit/virtual visit encounter:    Name of patient: Clarence Boyle    Date of encounter: 8/8/2023    Time of start of video visit: 11:00    Video started: 11: 07    Video ended: 11:27    Provider location: working from home/ [facility]    Patient location: patients home.    Mode of transmission: [employer] video/ Ever[employer]    Verbal consent: obtained before starting visit. Pt is agreeable.      The patient has been notified of following:      \"This VIDEO visit will be conducted via a call between you and your physician/provider. We have found that certain health care needs can be provided without the need for a physical exam.  This service lets us provide the care you need with a short phone conversation.  If a prescription is necessary we can send it directly to your pharmacy.  If lab work is needed we can place an order for that and you can then stop by our lab to have the test done at a later time.     With new updates with corona virus patient might be billed as clinic visit.     If during the course of the call the physician/provider feels a telephone visit is not appropriate, you will not be charged for this service.\"      Past medical history, social history, family history, allergy and medications were reviewed and updated as appropriate.  Reviewed pertinent labs, notes, imaging studies personally.    ENDOCRINOLOGY CLINIC NOTE:  Name: Clarence Boyle  Self referral for Diabetes.  HPI:  Clarence Boyle is a 42 year old male who presents for the evaluation/management of Diabetes.   " has a past medical history of Diabetes (H), Hypertension, and Obese.    Was seen by  before at [facility].  Was followed by CDE closely as well.  Available records, labs and images from outside clinic were personally reviewed.    Reports on and off low BG.  Appetite is OK.  Weight: mostly stable.    Has never been on insulin pump.  Using DEXCOM.    1. Type 2 DM:  Orginally diagnosed at the age of: in 30s.  Current Regimen:   Metformin 2000 mg with dinner.  Jardiance 25 mg/day  Semglee 58 units in PM  Humalog 2 unit/10 gm of CHO + ssi 1:50> 140.   (1/2 of correction at beditme)  (about 18-20 units TID)    yes:     Diabetes Medication(s)     Biguanides       metFORMIN (GLUCOPHAGE XR) 500 MG 24 hr tablet    Take 4 tablets (2,000 mg) by mouth daily (with dinner)    Insulin       Insulin Glargine-yfgn (SEMGLEE, YFGN,) 100 UNIT/ML SOPN    Inject 65 Units Subcutaneous daily Call to schedule with new provider Dr Interiano leaving     Insulin Lispro (HUMALOG KWIKPEN) 200 UNIT/ML soln    INJECT 2 UNITS/10 GRAMS CARBS UNDER SKIN AS DIRECTED WITH CORRECTION 1 UNIT/50 ABOVE 140. TAKE 1/2 CORRECTION AT BEDTIME. ABOUT 80 UNITS DAILY    Sodium-Glucose Co-Transporter 2 (SGLT2) Inhibitors       empagliflozin (JARDIANCE) 25 MG TABS tablet    Take 1 tablet (25 mg) by mouth daily        Victoza-- had GI s/e.    BS checks: DEXCOM  Average Meter Download: Blood glucose data reviewed personally. See nursing note from this encounter for details.  Last A1c: 9.0%  Symptoms of hypoglycemia (low blood sugar):  Gets symptoms of hypoglycemia.    DM Complications:   Complications:   Diabetes Complications  Description / Detail    Diabetic Retinopathy  No   CAD / PAD  No   Neuropathy  No   Nephropathy / Microalbuminuria  No   Gastroparesis  No   Hypoglycemia [place]awarness  No       2. Hypertension:    on medications  3. Hyperlipidemia: on medications  Medications     HMG CoA Reductase Inhibitors     simvastatin (ZOCOR) 20 MG tablet              PMH/PSH:  Past Medical History:   Diagnosis Date     Diabetes (H)      Hypertension      Obese      Past Surgical History:   Procedure Laterality Date     URETHROPLASTY STAGE ONE WITH BUCCAL GRAFT N/A 5/2/2018    Procedure: URETHROPLASTY STAGE ONE WITH BUCCAL GRAFT;  Posterior Urethroplasty with Single Buccal Mucosa Graft;  Surgeon: Herb Awad MD;  Location:  OR     Family Hx:  Family History   Problem Relation Age of Onset     Cancer Mother         Gynecologic     Thyroid Disease Mother      No Known Problems Father      Cancer Maternal Grandmother      Diabetes Paternal Grandmother      Mental Illness Paternal Grandmother      Colon Cancer Cousin         Approximately late 30s       Diabetes: maternal aunt, p.grandmother.    Social Hx:  Social History     Socioeconomic History     Marital status:      Spouse name: Not on file     Number of children: Not on file     Years of education: Not on file     Highest education level: Not on file   Occupational History     Not on file   Tobacco Use     Smoking status: Never     Passive exposure: Never     Smokeless tobacco: Never   Vaping Use     Vaping Use: Never used   Substance and Sexual Activity     Alcohol use: Yes     Comment: 2-3/month     Drug use: No     Sexual activity: Yes     Partners: Female   Other Topics Concern     Not on file   Social History Narrative     Not on file     Social Determinants of Health     Financial Resource Strain: Not on file   Food Insecurity: Not on file   Transportation Needs: Not on file   Physical Activity: Not on file   Stress: Not on file   Social Connections: Not on file   Intimate Partner Violence: Not on file   Housing Stability: Not on file          MEDICATIONS:  has a current medication list which includes the following prescription(s): blood glucose, blood glucose monitoring, bupropion, dexcom g6 sensor, dexcom g7 sensor, dexcom g6 transmitter, empagliflozin, insulin glargine-yfgn, humalog kwikpen,  bd maty u/f, lisinopril, loratadine, metformin, multiple vitamins-minerals, nystatin, and simvastatin.    ROS     ROS: 10 point ROS neg other than the symptoms noted above in the HPI.    Physical Exam   VS: There were no vitals taken for this visit.  GENERAL: healthy, alert and no distress  EYES: Eyes grossly normal to inspection, conjunctivae and sclerae normal  ENT: no nose swelling, nasal discharge.  Thyroid: no apparent thyroid nodules  RESP: no audible wheeze, cough, or visible cyanosis.  No visible retractions or increased work of breathing.  Able to speak fully in complete sentences.  ABDO: not evaluated.  EXTREMITIES: no hand tremors.  NEURO: Cranial nerves grossly intact, mentation intact and speech normal  SKIN: No apparent skin lesions, rash or edema seen   PSYCH: mentation appears normal, affect normal/bright, judgement and insight intact, normal speech and appearance well-groomed    LABS:  A1c:  Lab Results   Component Value Date    A1C 7.1 04/18/2023    A1C 9.0 03/02/2022    A1C 8.3 11/04/2021    A1C 8.4 04/28/2020    A1C 7.7 01/10/2019    A1C 7.2 08/07/2018     Creatinine:  Creatinine   Date Value Ref Range Status   04/26/2023 0.97 0.67 - 1.17 mg/dL Final   04/28/2020 0.65 (L) 0.66 - 1.25 mg/dL Final   ]    Urine Micro:  Lab Results   Component Value Date    UMALCR  04/26/2023      Comment:      Unable to calculate, urine albumin and/or urine creatinine is outside detectable limits.  Microalbuminuria is defined as an albumin:creatinine ratio of 17 to 299 for males and 25 to 299 for females. A ratio of albumin:creatinine of 300 or higher is indicative of overt proteinuria.  Due to biologic variability, positive results should be confirmed by a second, first-morning random or 24-hour timed urine specimen. If there is discrepancy, a third specimen is recommended. When 2 out of 3 results are in the microalbuminuria range, this is evidence for incipient nephropathy and warrants increased efforts at glucose  control, blood pressure control, and institution of therapy with an angiotensin-converting-enzyme (ACE) inhibitor (if the patient can tolerate it).      UMALCR  03/02/2022      Comment:      Unable to calculate:  Urine creatinine or albumin value below detectable level    UMALCR Unable to calculate due to low value 04/28/2020          LFTs/Lipids:  Recent Labs   Lab Test 04/26/23  0905 03/02/22  0848   CHOL 127 142   HDL 49 58   LDL 59 60   TRIG 93 120     TFTs:  TSH   Date Value Ref Range Status   04/26/2023 2.83 0.30 - 4.20 uIU/mL Final   03/02/2022 2.20 0.40 - 4.00 mU/L Final     All pertinent notes, labs, and images personally reviewed by me.     Glucometer/ insulin pump (if applicable)/ CGM data (if applicable) downloaded, Personally reviewed and interpreted.  All Blood sugar data reviewed personally and discussed with pt.      A/P  Mr.Ryan PHONG Boyle is a 43 year old here for the evaluation/management of diabetes:    1. DM2 - Under poor control.  A1c 7.1%  No know complications.  BG high during day.  Overnight BG- few low BG noted.  Plan:  Discussed diagnosis, pathophysiology, management and treatment options of condition with pt.  I also discussed importance of strict blood sugar control to prevent complications associated with uncontrolled diabetes.  Decrease Semglee to 54 Units. If having low Blood glucose episodes on this dose decrease further by 2-4 units.  Continue metformin and Humalog at current dose.  Continue Jardiance 25 mg/day.  Decrease carbs with meals.  Continue to use DEXCOM.  Labs needed.  Repeat labs and follow up in 3-4 months.  Please make a lab appointment for blood work and follow up clinic appointment in 1 week after that to discuss results.    2. Hypertension - Under Good control.  On lisinopril.    3. Hyperlipidemia - On simvastatin 20 mg/day. LDL 60    4. Prevention:  Opthalmology- recommend annually  ASA-not indicated.  Smoking- No    Most Recent Immunizations   Administered Date(s)  Administered     COVID-19 Bivalent 18+ (Moderna) 10/19/2022     COVID-19 MONOVALENT 12+ (Pfizer) 12/28/2021     COVID-19 Vaccine (Bouchra) 12/28/2021     HEPATITIS A (PEDS 12M-18Y) 02/21/2000     Hepatitis B (Peds <19Y) 04/12/1999     Hepatitis B, Adult 09/11/2013     Influenza (IIV3) PF 09/11/2013     Influenza Vaccine >6 months (Alfuria,Fluzone) 12/22/2022     Influenza Vaccine, 6+MO IM (QUADRIVALENT W/PRESERVATIVES) 11/26/2019     MMR 08/05/1992     Pneumococcal 23 valent 11/17/2008     TDAP (Adacel,Boostrix) 04/26/2023     Td (Adult), Adsorbed 11/10/1995         Recommend checking blood sugars before meals and at bedtime.    If Blood glucose are low more often-> 2-3 times/week- give us a call.  The patient is advised to Make better food choices: reduce carbs, Reduce portion size, weight loss and exercise 3-4 times a week.  Discussed hypoglycemia signs and symptoms as well as management in detail.    There is some variability among people, most will usually develop symptoms suggestive of hypoglycemia when blood glucose levels are lowered to the mid 60's. The first set of symptoms are called adrenergic. Patients may experience any of the following nervousness, sweating, intense hunger, trembling, weakness, palpitations, and difficulty speaking.   The acute management of hypoglycemia involves the rapid delivery of a source of easily absorbed sugar. Regular soda, juice, lifesavers, table sugar, are good options. 15 grams of glucose is the dose that is given, followed by an assessment of symptoms and a blood glucose check if possible. If after 10 minutes there is no improvement, another 10-15 grams should be given. This can be repeated up to three times. The equivalency of 10-15 grams of glucose (approximate servings) are: Four lifesavers, 4 teaspoons of sugar, or 1/2 can of regular soda or juice.     All questions were answered.  The patient indicates understanding of the above issues and agrees with the plan set  forth.     Follow-up:  As noted in AVS    FRANCISCO Rader  [place] [facility]  CC: Damien Contreras    Disclaimer: This note consists of symbols derived from keyboarding, dictation and/or voice recognition software. As a result, there may be errors in the script that have gone undetected. Please consider this when interpreting information found in this chart.    Addendum to above note and clinic visit:    Labs reviewed.    See result note/telephone encounter.                Again, thank you for allowing me to participate in the care of your patient.        Sincerely,        Nayana Salinas MD

## 2023-08-08 NOTE — PROGRESS NOTES
"THIS IS A VIDEO VISIT:    Phone call visit/virtual visit encounter:    Name of patient: Clarence Boyle    Date of encounter: 8/8/2023    Time of start of video visit: 11:00    Video started: 11: 07    Video ended: 11:27    Provider location: working from [place]/ [facility]    Patient location: patients home.    Mode of transmission: [employer] video/ [employer]    Verbal consent: obtained before starting visit. Pt is agreeable.      The patient has been notified of following:      \"This VIDEO visit will be conducted via a call between you and your physician/provider. We have found that certain health care needs can be provided without the need for a physical exam.  This service lets us provide the care you need with a short phone conversation.  If a prescription is necessary we can send it directly to your pharmacy.  If lab work is needed we can place an order for that and you can then stop by our lab to have the test done at a later time.     With new updates with corona virus patient might be billed as clinic visit.     If during the course of the call the physician/provider feels a telephone visit is not appropriate, you will not be charged for this service.\"      Past medical history, social history, family history, allergy and medications were reviewed and updated as appropriate.  Reviewed pertinent labs, notes, imaging studies personally.    ENDOCRINOLOGY CLINIC NOTE:  Name: Clarence Boyle  Self referral for Diabetes.  HPI:  Clarence Boyle is a 42 year old male who presents for the evaluation/management of Diabetes.   has a past medical history of Diabetes (H), Hypertension, and Obese.    Was seen by  before at [facility].  Was followed by CDE closely as well.  Available records, labs and images from outside clinic were personally reviewed.    Reports on and off low BG.  Appetite is OK.  Weight: mostly stable.    Has never been on insulin pump.  Using DEXCOM.    1. Type 2 DM:  Orginally diagnosed at the age of: in " 30s.  Current Regimen:   Metformin 2000 mg with dinner.  Jardiance 25 mg/day  Semglee 58 units in PM  Humalog 2 unit/10 gm of CHO + ssi 1:50> 140.   (1/2 of correction at beditme)  (about 18-20 units TID)    yes:     Diabetes Medication(s)     Biguanides       metFORMIN (GLUCOPHAGE XR) 500 MG 24 hr tablet    Take 4 tablets (2,000 mg) by mouth daily (with dinner)    Insulin       Insulin Glargine-yfgn (SEMGLEE, YFGN,) 100 UNIT/ML SOPN    Inject 65 Units Subcutaneous daily Call to schedule with new provider Dr Laisha spence     Insulin Lispro (HUMALOG KWIKPEN) 200 UNIT/ML soln    INJECT 2 UNITS/10 GRAMS CARBS UNDER SKIN AS DIRECTED WITH CORRECTION 1 UNIT/50 ABOVE 140. TAKE 1/2 CORRECTION AT BEDTIME. ABOUT 80 UNITS DAILY    Sodium-Glucose Co-Transporter 2 (SGLT2) Inhibitors       empagliflozin (JARDIANCE) 25 MG TABS tablet    Take 1 tablet (25 mg) by mouth daily        Victoza-- had GI s/e.    BS checks: DEXCOM  Average Meter Download: Blood glucose data reviewed personally. See nursing note from this encounter for details.  Last A1c: 9.0%  Symptoms of hypoglycemia (low blood sugar):  Gets symptoms of hypoglycemia.    DM Complications:   Complications:   Diabetes Complications  Description / Detail    Diabetic Retinopathy  No   CAD / PAD  No   Neuropathy  No   Nephropathy / Microalbuminuria  No   Gastroparesis  No   Hypoglycemia [place]awarness  No       2. Hypertension:    on medications  3. Hyperlipidemia: on medications  Medications     HMG CoA Reductase Inhibitors     simvastatin (ZOCOR) 20 MG tablet             PMH/PSH:  Past Medical History:   Diagnosis Date     Diabetes (H)      Hypertension      Obese      Past Surgical History:   Procedure Laterality Date     URETHROPLASTY STAGE ONE WITH BUCCAL GRAFT N/A 5/2/2018    Procedure: URETHROPLASTY STAGE ONE WITH BUCCAL GRAFT;  Posterior Urethroplasty with Single Buccal Mucosa Graft;  Surgeon: Herb Awad MD;  Location:  OR     Family Hx:  Family History    Problem Relation Age of Onset     Cancer Mother         Gynecologic     Thyroid Disease Mother      No Known Problems Father      Cancer Maternal Grandmother      Diabetes Paternal Grandmother      Mental Illness Paternal Grandmother      Colon Cancer Cousin         Approximately late 30s       Diabetes: maternal aunt, p.grandmother.    Social Hx:  Social History     Socioeconomic History     Marital status:      Spouse name: Not on file     Number of children: Not on file     Years of education: Not on file     Highest education level: Not on file   Occupational History     Not on file   Tobacco Use     Smoking status: Never     Passive exposure: Never     Smokeless tobacco: Never   Vaping Use     Vaping Use: Never used   Substance and Sexual Activity     Alcohol use: Yes     Comment: 2-3/month     Drug use: No     Sexual activity: Yes     Partners: Female   Other Topics Concern     Not on file   Social History Narrative     Not on file     Social Determinants of Health     Financial Resource Strain: Not on file   Food Insecurity: Not on file   Transportation Needs: Not on file   Physical Activity: Not on file   Stress: Not on file   Social Connections: Not on file   Intimate Partner Violence: Not on file   Housing Stability: Not on file          MEDICATIONS:  has a current medication list which includes the following prescription(s): blood glucose, blood glucose monitoring, bupropion, dexcom g6 sensor, dexcom g7 sensor, dexcom g6 transmitter, empagliflozin, insulin glargine-yfgn, humalog kwikpen, bd maty u/f, lisinopril, loratadine, metformin, multiple vitamins-minerals, nystatin, and simvastatin.    ROS     ROS: 10 point ROS neg other than the symptoms noted above in the HPI.    Physical Exam   VS: There were no vitals taken for this visit.  GENERAL: healthy, alert and no distress  EYES: Eyes grossly normal to inspection, conjunctivae and sclerae normal  ENT: no nose swelling, nasal discharge.  Thyroid:  no apparent thyroid nodules  RESP: no audible wheeze, cough, or visible cyanosis.  No visible retractions or increased work of breathing.  Able to speak fully in complete sentences.  ABDO: not evaluated.  EXTREMITIES: no hand tremors.  NEURO: Cranial nerves grossly intact, mentation intact and speech normal  SKIN: No apparent skin lesions, rash or edema seen   PSYCH: mentation appears normal, affect normal/bright, judgement and insight intact, normal speech and appearance well-groomed    LABS:  A1c:  Lab Results   Component Value Date    A1C 7.1 04/18/2023    A1C 9.0 03/02/2022    A1C 8.3 11/04/2021    A1C 8.4 04/28/2020    A1C 7.7 01/10/2019    A1C 7.2 08/07/2018     Creatinine:  Creatinine   Date Value Ref Range Status   04/26/2023 0.97 0.67 - 1.17 mg/dL Final   04/28/2020 0.65 (L) 0.66 - 1.25 mg/dL Final   ]    Urine Micro:  Lab Results   Component Value Date    UMALCR  04/26/2023      Comment:      Unable to calculate, urine albumin and/or urine creatinine is outside detectable limits.  Microalbuminuria is defined as an albumin:creatinine ratio of 17 to 299 for males and 25 to 299 for females. A ratio of albumin:creatinine of 300 or higher is indicative of overt proteinuria.  Due to biologic variability, positive results should be confirmed by a second, first-morning random or 24-hour timed urine specimen. If there is discrepancy, a third specimen is recommended. When 2 out of 3 results are in the microalbuminuria range, this is evidence for incipient nephropathy and warrants increased efforts at glucose control, blood pressure control, and institution of therapy with an angiotensin-converting-enzyme (ACE) inhibitor (if the patient can tolerate it).      UMALCR  03/02/2022      Comment:      Unable to calculate:  Urine creatinine or albumin value below detectable level    UMALCR Unable to calculate due to low value 04/28/2020          LFTs/Lipids:  Recent Labs   Lab Test 04/26/23  0905 03/02/22  0848   CHOL 127  142   HDL 49 58   LDL 59 60   TRIG 93 120     TFTs:  TSH   Date Value Ref Range Status   04/26/2023 2.83 0.30 - 4.20 uIU/mL Final   03/02/2022 2.20 0.40 - 4.00 mU/L Final     All pertinent notes, labs, and images personally reviewed by me.     Glucometer/ insulin pump (if applicable)/ CGM data (if applicable) downloaded, Personally reviewed and interpreted.  All Blood sugar data reviewed personally and discussed with pt.      A/P  Mr.Ryan PHONG Boyle is a 43 year old here for the evaluation/management of diabetes:    1. DM2 - Under poor control.  A1c 7.1%  No know complications.  BG high during day.  Overnight BG- few low BG noted.  Plan:  Discussed diagnosis, pathophysiology, management and treatment options of condition with pt.  I also discussed importance of strict blood sugar control to prevent complications associated with uncontrolled diabetes.  Decrease Semglee to 54 Units. If having low Blood glucose episodes on this dose decrease further by 2-4 units.  Continue metformin and Humalog at current dose.  Continue Jardiance 25 mg/day.  Decrease carbs with meals.  Continue to use DEXCOM.  Labs needed.  Repeat labs and follow up in 3-4 months.  Please make a lab appointment for blood work and follow up clinic appointment in 1 week after that to discuss results.    2. Hypertension - Under Good control.  On lisinopril.    3. Hyperlipidemia - On simvastatin 20 mg/day. LDL 60    4. Prevention:  Opthalmology- recommend annually  ASA-not indicated.  Smoking- No    Most Recent Immunizations   Administered Date(s) Administered     COVID-19 Bivalent 18+ (Moderna) 10/19/2022     COVID-19 MONOVALENT 12+ (Pfizer) 12/28/2021     COVID-19 Vaccine (Bouchra) 12/28/2021     HEPATITIS A (PEDS 12M-18Y) 02/21/2000     Hepatitis B (Peds <19Y) 04/12/1999     Hepatitis B, Adult 09/11/2013     Influenza (IIV3) PF 09/11/2013     Influenza Vaccine >6 months (Alfuria,Fluzone) 12/22/2022     Influenza Vaccine, 6+MO IM (QUADRIVALENT  W/PRESERVATIVES) 11/26/2019     MMR 08/05/1992     Pneumococcal 23 valent 11/17/2008     TDAP (Adacel,Boostrix) 04/26/2023     Td (Adult), Adsorbed 11/10/1995         Recommend checking blood sugars before meals and at bedtime.    If Blood glucose are low more often-> 2-3 times/week- give us a call.  The patient is advised to Make better food choices: reduce carbs, Reduce portion size, weight loss and exercise 3-4 times a week.  Discussed hypoglycemia signs and symptoms as well as management in detail.    There is some variability among people, most will usually develop symptoms suggestive of hypoglycemia when blood glucose levels are lowered to the mid 60's. The first set of symptoms are called adrenergic. Patients may experience any of the following nervousness, sweating, intense hunger, trembling, weakness, palpitations, and difficulty speaking.   The acute management of hypoglycemia involves the rapid delivery of a source of easily absorbed sugar. Regular soda, juice, lifesavers, table sugar, are good options. 15 grams of glucose is the dose that is given, followed by an assessment of symptoms and a blood glucose check if possible. If after 10 minutes there is no improvement, another 10-15 grams should be given. This can be repeated up to three times. The equivalency of 10-15 grams of glucose (approximate servings) are: Four lifesavers, 4 teaspoons of sugar, or 1/2 can of regular soda or juice.     All questions were answered.  The patient indicates understanding of the above issues and agrees with the plan set forth.     Follow-up:  As noted in AVS    Nayana Salinas M.D  Endocrinology  [facility]  CC: Damien Contreras    Disclaimer: This note consists of symbols derived from keyboarding, dictation and/or voice recognition software. As a result, there may be errors in the script that have gone undetected. Please consider this when interpreting information found in this chart.    Addendum  to above note and clinic visit:    Labs reviewed.    See result note/telephone encounter.

## 2023-08-31 ENCOUNTER — DOCUMENTATION ONLY (OUTPATIENT)
Dept: INTERNAL MEDICINE | Facility: CLINIC | Age: 43
End: 2023-08-31
Payer: COMMERCIAL

## 2023-08-31 DIAGNOSIS — Z79.4 TYPE 2 DIABETES MELLITUS WITH HYPERGLYCEMIA, WITH LONG-TERM CURRENT USE OF INSULIN (H): Primary | ICD-10-CM

## 2023-08-31 DIAGNOSIS — E11.65 TYPE 2 DIABETES MELLITUS WITH HYPERGLYCEMIA, WITH LONG-TERM CURRENT USE OF INSULIN (H): Primary | ICD-10-CM

## 2023-08-31 NOTE — PROGRESS NOTES
Clarence Boyle has an upcoming lab appointment:    Future Appointments   Date Time Provider Department Center   9/2/2023 11:15 AM [facility]   12/5/2023  3:00 PM Nayana Salinas MD [facility]     Patient is scheduled for the following lab(s): A1C    There is no order available. Please review and place either future orders or HMPO (Review of Health Maintenance Protocol Orders), as appropriate.    There are no preventive care reminders to display for this patient.  Fadumo Hunt

## 2023-09-02 ENCOUNTER — LAB (OUTPATIENT)
Dept: LAB | Facility: CLINIC | Age: 43
End: 2023-09-02
Payer: COMMERCIAL

## 2023-09-02 DIAGNOSIS — E66.01 MORBID OBESITY (H): ICD-10-CM

## 2023-09-02 DIAGNOSIS — E11.65 TYPE 2 DIABETES MELLITUS WITH HYPERGLYCEMIA, UNSPECIFIED WHETHER LONG TERM INSULIN USE (H): ICD-10-CM

## 2023-09-02 DIAGNOSIS — Z79.4 TYPE 2 DIABETES MELLITUS WITH HYPERGLYCEMIA, WITH LONG-TERM CURRENT USE OF INSULIN (H): ICD-10-CM

## 2023-09-02 DIAGNOSIS — E11.9 TYPE 2 DIABETES MELLITUS WITHOUT COMPLICATION, UNSPECIFIED WHETHER LONG TERM INSULIN USE (H): ICD-10-CM

## 2023-09-02 DIAGNOSIS — E11.65 TYPE 2 DIABETES MELLITUS WITH HYPERGLYCEMIA, WITH LONG-TERM CURRENT USE OF INSULIN (H): ICD-10-CM

## 2023-09-02 LAB — HBA1C MFR BLD: 7.3 % (ref 0–5.6)

## 2023-09-02 PROCEDURE — 83036 HEMOGLOBIN GLYCOSYLATED A1C: CPT

## 2023-09-02 PROCEDURE — 36415 COLL VENOUS BLD VENIPUNCTURE: CPT

## 2023-09-02 NOTE — LETTER
September 5, 2023      Clarence Boyle  [address]  [facility] [facility] [facility] [address]        Dear ,    We are writing to inform you of your test results.    Slightly worsened diabetes control. Try to improve diet and exercise. Follow up with endocrinology.     Resulted Orders   Hemoglobin A1c   Result Value Ref Range    Hemoglobin A1C 7.3 (H) 0.0 - 5.6 %      Comment:      Normal <5.7%   Prediabetes 5.7-6.4%    Diabetes 6.5% or higher     Note: Adopted from ADA consensus guidelines.       If you have any questions or concerns, please call the clinic at the number listed above.       Sincerely,      Damien Contreras MD

## 2023-09-05 RX ORDER — INSULIN GLARGINE-YFGN 100 [IU]/ML
54 INJECTION, SOLUTION SUBCUTANEOUS DAILY
Qty: 45 ML | Refills: 0 | Status: SHIPPED | OUTPATIENT
Start: 2023-09-05 | End: 2023-11-08

## 2023-09-05 RX ORDER — METFORMIN HCL 500 MG
2000 TABLET, EXTENDED RELEASE 24 HR ORAL
Qty: 360 TABLET | Refills: 0 | Status: SHIPPED | OUTPATIENT
Start: 2023-09-05 | End: 2024-01-02

## 2023-09-05 NOTE — TELEPHONE ENCOUNTER
"Requested Prescriptions   Pending Prescriptions Disp Refills    SEMGLEE (YFGN) 100 UNIT/ML SOPN [Pharmacy Med Name: SEMGLEE (YFGN)100U/ML PF  PEN 3ML] 45 mL 0     Sig: INJECT 65 UNITS UNDER THE SKIN DAILY       There is no refill protocol information for this order       metFORMIN (GLUCOPHAGE XR) 500 MG 24 hr tablet [Pharmacy Med Name: METFORMIN ER 500MG 24HR TABS] 360 tablet 0     Sig: TAKE 4 TABLETS(2000 MG) BY MOUTH DAILY WITH DINNER       Biguanide Agents Passed - 9/2/2023  2:02 PM        Passed - Patient is age 10 or older        Passed - Patient has documented A1c within the specified period of time.     If HgbA1C is 8 or greater, it needs to be on file within the past 3 months.  If less than 8, must be on file within the past 6 months.     Recent Labs   Lab Test 09/02/23  1119 04/28/20  0904 09/23/19  0000   A1C 7.3*   < >  --    HEMOGLOBINA1  --   --  7.5*    < > = values in this interval not displayed.             Passed - Patient's CR is NOT>1.4 OR Patient's EGFR is NOT<45 within past 12 mos.     Recent Labs   Lab Test 04/26/23  0905 11/04/21  1241 04/28/20  0904   GFRESTIMATED >90   < > >90   GFRESTBLACK  --   --  >90    < > = values in this interval not displayed.       Recent Labs   Lab Test 04/26/23  0905   CR 0.97             Passed - Patient does NOT have a diagnosis of CHF.        Passed - Medication is active on med list        Passed - Recent (6 mo) or future (30 days) visit within the authorizing provider's specialty     Patient had office visit in the last 6 months or has a visit in the next 30 days with authorizing provider or within the authorizing provider's specialty.  See \"Patient Info\" tab in inbasket, or \"Choose Columns\" in Meds & Orders section of the refill encounter.                 "

## 2023-09-24 DIAGNOSIS — E11.9 TYPE 2 DIABETES MELLITUS WITHOUT COMPLICATION, UNSPECIFIED WHETHER LONG TERM INSULIN USE (H): ICD-10-CM

## 2023-09-24 DIAGNOSIS — Z79.4 TYPE 2 DIABETES MELLITUS WITH HYPERGLYCEMIA, WITH LONG-TERM CURRENT USE OF INSULIN (H): ICD-10-CM

## 2023-09-24 DIAGNOSIS — E11.65 TYPE 2 DIABETES MELLITUS WITH HYPERGLYCEMIA, WITH LONG-TERM CURRENT USE OF INSULIN (H): ICD-10-CM

## 2023-09-25 RX ORDER — PROCHLORPERAZINE 25 MG/1
SUPPOSITORY RECTAL
Qty: 1 EACH | Refills: 1 | Status: SHIPPED | OUTPATIENT
Start: 2023-09-25 | End: 2024-03-28

## 2023-09-25 RX ORDER — EMPAGLIFLOZIN 25 MG/1
25 TABLET, FILM COATED ORAL DAILY
Qty: 90 TABLET | Refills: 1 | Status: SHIPPED | OUTPATIENT
Start: 2023-09-25 | End: 2024-03-25

## 2023-09-25 NOTE — TELEPHONE ENCOUNTER
"    Requested Prescriptions   Pending Prescriptions Disp Refills    Continuous Blood Gluc Transmit (DEXCOM G6 TRANSMITTER) MISC [Pharmacy Med Name: DEXCOM G6 TRANSMITTER  MISC] 1 each 1     Sig: CHANGE EVERY 90 DAYS       Diabetic Supplies Protocol Passed - 9/24/2023  9:37 PM        Passed - Medication is active on med list        Passed - Patient is 18 years of age or older        Passed - Recent (6 mo) or future (30 days) visit within the authorizing provider's specialty     Patient had office visit in the last 6 months or has a visit in the next 30 days with authorizing provider.  See \"Patient Info\" tab in inbasket, or \"Choose Columns\" in Meds & Orders section of the refill encounter.              JARDIANCE 25 MG TABS tablet [Pharmacy Med Name: JARDIANCE 25MG TABS] 90 tablet 1     Sig: TAKE ONE TABLET BY MOUTH ONCE DAILY       Sodium Glucose Co-Transport Inhibitor Agents Passed - 9/24/2023  9:37 PM        Passed - Patient has documented A1c within the specified period of time.     If HgbA1C is 8 or greater, it needs to be on file within the past 3 months.  If less than 8, must be on file within the past 6 months.     Recent Labs   Lab Test 09/02/23  1119 04/28/20  0904 09/23/19  0000   A1C 7.3*   < >  --    HEMOGLOBINA1  --   --  7.5*    < > = values in this interval not displayed.             Passed - No creatinine >1.4 or GFR <45 within the past 12 mos     Recent Labs   Lab Test 04/26/23  0905 11/04/21  1241 04/28/20  0904   GFRESTIMATED >90   < > >90   GFRESTBLACK  --   --  >90    < > = values in this interval not displayed.       Recent Labs   Lab Test 04/26/23  0905   CR 0.97             Passed - Medication is active on med list        Passed - Patient is age 18 or older        Passed - Patient has documented normal Potassium within the last 12 mos.     Recent Labs   Lab Test 04/26/23 0905   POTASSIUM 4.9             Passed - Recent (6 mo) or future (30 days) visit within the authorizing provider's " "specialty     Patient had office visit in the last 6 months or has a visit in the next 30 days with authorizing provider or within the authorizing provider's specialty.  See \"Patient Info\" tab in inbasket, or \"Choose Columns\" in Meds & Orders section of the refill encounter.                 "

## 2023-09-27 DIAGNOSIS — E11.65 TYPE 2 DIABETES MELLITUS WITH HYPERGLYCEMIA, UNSPECIFIED WHETHER LONG TERM INSULIN USE (H): ICD-10-CM

## 2023-09-27 RX ORDER — PROCHLORPERAZINE 25 MG/1
SUPPOSITORY RECTAL
Qty: 3 EACH | Refills: 2 | Status: SHIPPED | OUTPATIENT
Start: 2023-09-27 | End: 2023-12-21

## 2023-09-27 NOTE — TELEPHONE ENCOUNTER
Requested Prescriptions   Pending Prescriptions Disp Refills    Continuous Blood Gluc Sensor (DEXCOM G6 SENSOR) MISC [Pharmacy Med Name: DEXCOM G6 SENSOR  MISC]  2     Sig: CHANGE EVERY 10 DAYS.       There is no refill protocol information for this order

## 2023-10-26 ENCOUNTER — MYC MEDICAL ADVICE (OUTPATIENT)
Dept: INTERNAL MEDICINE | Facility: CLINIC | Age: 43
End: 2023-10-26
Payer: COMMERCIAL

## 2023-11-08 DIAGNOSIS — E66.01 MORBID OBESITY (H): ICD-10-CM

## 2023-11-08 DIAGNOSIS — E11.65 TYPE 2 DIABETES MELLITUS WITH HYPERGLYCEMIA, UNSPECIFIED WHETHER LONG TERM INSULIN USE (H): ICD-10-CM

## 2023-11-08 DIAGNOSIS — E11.9 TYPE 2 DIABETES MELLITUS WITHOUT COMPLICATION, UNSPECIFIED WHETHER LONG TERM INSULIN USE (H): ICD-10-CM

## 2023-11-08 RX ORDER — INSULIN GLARGINE-YFGN 100 [IU]/ML
INJECTION, SOLUTION SUBCUTANEOUS
Qty: 45 ML | Refills: 0 | Status: SHIPPED | OUTPATIENT
Start: 2023-11-08 | End: 2024-03-06

## 2023-11-08 RX ORDER — SIMVASTATIN 20 MG
20 TABLET ORAL DAILY
Qty: 90 TABLET | Refills: 0 | Status: SHIPPED | OUTPATIENT
Start: 2023-11-08 | End: 2024-03-05

## 2023-11-08 NOTE — TELEPHONE ENCOUNTER
Requested Prescriptions   Pending Prescriptions Disp Refills    SEMGLEE (YFGN) 100 UNIT/ML SOPN [Pharmacy Med Name: SEMGLEE (YFGN)100U/ML PF  PEN 3ML] 45 mL 0     Sig: ADMINISTER 54 UNITS UNDER THE SKIN DAILY       There is no refill protocol information for this order

## 2023-11-28 NOTE — PROGRESS NOTES
Outcome for 11/28/23 12:18 PM: Data uploaded on Dexcom  Amy Hendricks MA  Outcome for 12/01/23 3:22 PM: Dexcom emailed to provider  Amy Hendricks MA

## 2023-12-05 ENCOUNTER — VIRTUAL VISIT (OUTPATIENT)
Dept: ENDOCRINOLOGY | Facility: CLINIC | Age: 43
End: 2023-12-05
Payer: COMMERCIAL

## 2023-12-05 DIAGNOSIS — E11.65 TYPE 2 DIABETES MELLITUS WITH HYPERGLYCEMIA, WITH LONG-TERM CURRENT USE OF INSULIN (H): Primary | ICD-10-CM

## 2023-12-05 DIAGNOSIS — Z79.4 TYPE 2 DIABETES MELLITUS WITH HYPERGLYCEMIA, WITH LONG-TERM CURRENT USE OF INSULIN (H): Primary | ICD-10-CM

## 2023-12-05 PROCEDURE — 99214 OFFICE O/P EST MOD 30 MIN: CPT | Mod: VID | Performed by: INTERNAL MEDICINE

## 2023-12-05 PROCEDURE — 95251 CONT GLUC MNTR ANALYSIS I&R: CPT | Performed by: INTERNAL MEDICINE

## 2023-12-05 ASSESSMENT — PAIN SCALES - GENERAL: PAINLEVEL: NO PAIN (0)

## 2023-12-05 NOTE — LETTER
"    12/5/2023         RE: Clarence Boyle  [id number]  [facility] [address]        Dear Colleague,    Thank you for referring your patient, Clarence Boyle, to the [facility]. Please see a copy of my visit note below.    Outcome for 11/28/23 12:18 PM: Data uploaded on Dexcom  Amy Hendricks MA  Outcome for 12/01/23 3:22 PM: Dexcom emailed to provider  Amy Hendricks MA      THIS IS A VIDEO VISIT:    Phone call visit/virtual visit encounter:    Name of patient: Clarence Boyle    Date of encounter: 12/5/2023    Time of start of video visit: 3:02    Video started: 3:07    Video ended: 3:23    Provider location: working from home/ [facility]    Patient location: patients home.    Mode of transmission: [employer] video/ [employer]    Verbal consent: obtained before starting visit. Pt is agreeable.      The patient has been notified of following:      \"This VIDEO visit will be conducted via a call between you and your physician/provider. We have found that certain health care needs can be provided without the need for a physical exam.  This service lets us provide the care you need with a short phone conversation.  If a prescription is necessary we can send it directly to your pharmacy.  If lab work is needed we can place an order for that and you can then stop by our lab to have the test done at a later time.     With new updates with corona virus patient might be billed as clinic visit.     If during the course of the call the physician/provider feels a telephone visit is not appropriate, you will not be charged for this service.\"      Past medical history, social history, family history, allergy and medications were reviewed and updated as appropriate.  Reviewed pertinent labs, notes, imaging studies personally.    ENDOCRINOLOGY CLINIC NOTE:  Name: Clarence Boyle  Self referral for Diabetes.  HPI:  Clarence Boyle is a 43 year old male who presents for the evaluation/management of Diabetes.   has a past " medical history of Diabetes (H), Hypertension, and Obese.    Was seen by  before at [facility].  Was followed by CDE closely as well.  Available records, labs and images from outside clinic were personally reviewed.    Reports on and off low BG.  Appetite is OK.  Weight: mostly stable.    Has never been on insulin pump.  Using DEXCOM.  Twisted ankle- activity limited in last few weeks.    Wt Readings from Last 2 Encounters:   04/26/23 119.7 kg (264 lb)   03/02/22 116.9 kg (257 lb 11.2 oz)       1. Type 2 DM:  Orginally diagnosed at the age of: in 30s.  Current Regimen:   Metformin XR 2000 mg with dinner.  Jardiance 25 mg/day  Semglee 50 units at dinner time.  Humalog 2 unit/10 gm of CHO + ssi 1:50> 140.   (1/2 of correction at beditme)  (about 18-20 units TID)    yes:     Diabetes Medication(s)       Biguanides       metFORMIN (GLUCOPHAGE XR) 500 MG 24 hr tablet    TAKE 4 TABLETS(2000 MG) BY MOUTH DAILY WITH DINNER      Insulin       Insulin Lispro (HUMALOG KWIKPEN) 200 UNIT/ML soln    INJECT 2 UNITS/10 GRAMS CARBS UNDER SKIN AS DIRECTED WITH CORRECTION 1 UNIT/50 ABOVE 140. TAKE 1/2 CORRECTION AT BEDTIME. ABOUT 80 UNITS DAILY     SEMGLEE, YFGN, 100 UNIT/ML SOPN    ADMINISTER 54 UNITS UNDER THE SKIN DAILY      Sodium-Glucose Co-Transporter 2 (SGLT2) Inhibitors       JARDIANCE 25 MG TABS tablet    TAKE ONE TABLET BY MOUTH ONCE DAILY          Victoza-- had GI s/e.    BS checks: DEXCOM  Average Meter Download: Blood glucose data reviewed personally. See nursing note from this encounter for details.  Last A1c: 9.0%  Symptoms of hypoglycemia (low blood sugar):  Gets symptoms of hypoglycemia.    DM Complications:   Complications:   Diabetes Complications  Description / Detail    Diabetic Retinopathy  No   CAD / PAD  No   Neuropathy  No   Nephropathy / Microalbuminuria  No   Gastroparesis  No   Hypoglycemia [place]  No       2. Hypertension:    on medications  3. Hyperlipidemia: on medications  Medications       HMG  CoA Reductase Inhibitors     simvastatin (ZOCOR) 20 MG tablet               PMH/PSH:  Past Medical History:   Diagnosis Date     Diabetes (H)      Hypertension      Obese      Past Surgical History:   Procedure Laterality Date     URETHROPLASTY STAGE ONE WITH BUCCAL GRAFT N/A 5/2/2018    Procedure: URETHROPLASTY STAGE ONE WITH BUCCAL GRAFT;  Posterior Urethroplasty with Single Buccal Mucosa Graft;  Surgeon: Herb Awad MD;  Location: [facility] OR     Family Hx:  Family History   Problem Relation Age of Onset     Cancer Mother         Gynecologic     Thyroid Disease Mother      No Known Problems Father      Cancer Maternal Grandmother      Diabetes Paternal Grandmother      Mental Illness Paternal Grandmother      Colon Cancer Cousin         Approximately late 30s       Diabetes: maternal aunt, p.grandmother.    Social Hx:  Social History     Socioeconomic History     Marital status:      Spouse name: Not on file     Number of children: Not on file     Years of education: Not on file     Highest education level: Not on file   Occupational History     Not on file   Tobacco Use     Smoking status: Never     Passive exposure: Never     Smokeless tobacco: Never   Vaping Use     Vaping Use: Never used   Substance and Sexual Activity     Alcohol use: Yes     Comment: 2-3/month     Drug use: No     Sexual activity: Yes     Partners: Female   Other Topics Concern     Not on file   Social History Narrative     Not on file     Social Determinants of Health     Financial Resource Strain: Not on file   Food Insecurity: Not on file   Transportation Needs: Not on file   Physical Activity: Not on file   Stress: Not on file   Social Connections: Not on file   Interpersonal Safety: Not on file   Housing Stability: Not on file          MEDICATIONS:  has a current medication list which includes the following prescription(s): blood glucose, blood glucose monitoring, dexcom g6 sensor, dexcom g7 sensor, dexcom g6 transmitter,  humalog kwikpen, bd maty u/f, jardiance, lisinopril, loratadine, metformin, multiple vitamins-minerals, nystatin, semglee (yfgn), and simvastatin.    ROS     ROS: 10 point ROS neg other than the symptoms noted above in the HPI.    Physical Exam   VS: There were no vitals taken for this visit.  GENERAL: healthy, alert and no distress  EYES: Eyes grossly normal to inspection, conjunctivae and sclerae normal  ENT: no nose swelling, nasal discharge.  Thyroid: no apparent thyroid nodules  RESP: no audible wheeze, cough, or visible cyanosis.  No visible retractions or increased work of breathing.  Able to speak fully in complete sentences.  ABDO: not evaluated.  EXTREMITIES: no hand tremors.  NEURO: Cranial nerves grossly intact, mentation intact and speech normal  SKIN: No apparent skin lesions, rash or edema seen   PSYCH: mentation appears normal, affect normal/bright, judgement and insight intact, normal speech and appearance well-groomed    LABS:  A1c:  Lab Results   Component Value Date    A1C 7.3 09/02/2023    A1C 7.1 04/18/2023    A1C 9.0 03/02/2022    A1C 8.3 11/04/2021    A1C 8.4 04/28/2020    A1C 7.7 01/10/2019     Creatinine:  Creatinine   Date Value Ref Range Status   04/26/2023 0.97 0.67 - 1.17 mg/dL Final   04/28/2020 0.65 (L) 0.66 - 1.25 mg/dL Final   ]    Urine Micro:  Lab Results   Component Value Date    UMALCR  04/26/2023      Comment:      Unable to calculate, urine albumin and/or urine creatinine is outside detectable limits.  Microalbuminuria is defined as an albumin:creatinine ratio of 17 to 299 for males and 25 to 299 for females. A ratio of albumin:creatinine of 300 or higher is indicative of overt proteinuria.  Due to biologic variability, positive results should be confirmed by a second, first-morning random or 24-hour timed urine specimen. If there is discrepancy, a third specimen is recommended. When 2 out of 3 results are in the microalbuminuria range, this is evidence for incipient  nephropathy and warrants increased efforts at glucose control, blood pressure control, and institution of therapy with an angiotensin-converting-enzyme (ACE) inhibitor (if the patient can tolerate it).      UMALCR  03/02/2022      Comment:      Unable to calculate:  Urine creatinine or albumin value below detectable level    UMALCR Unable to calculate due to low value 04/28/2020          LFTs/Lipids:  Recent Labs   Lab Test 04/26/23  0905 03/02/22  0848   CHOL 127 142   HDL 49 58   LDL 59 60   TRIG 93 120     TFTs:  TSH   Date Value Ref Range Status   04/26/2023 2.83 0.30 - 4.20 uIU/mL Final   03/02/2022 2.20 0.40 - 4.00 mU/L Final     All pertinent notes, labs, and images personally reviewed by me.     Glucometer/ insulin pump (if applicable)/ CGM data (if applicable) downloaded, Personally reviewed and interpreted.  All Blood sugar data reviewed personally and discussed with pt.      A/P  Mr.Ryan PHONG Boyle is a 43 year old here for the evaluation/management of diabetes:    1. DM2 - Under poor control.  A1c 7.3%  No know complications.  BG high during day.  Plan:  Discussed diagnosis, pathophysiology, management and treatment options of condition with pt.  I also discussed importance of strict blood sugar control to prevent complications associated with uncontrolled diabetes.  Labs needed.  Continue Metformin XR 2000 mg with dinner.  Continue Jardiance 25 mg/day  Continue Semglee 50 units/day but take in in AM  Continue Humalog 2 unit/10 gm of CHO + ssi 1:50> 140.   (1/2 of correction at beditme)  Continue to use DEXCOM  Consider insulin pump-- Omnipod and Tandem are compatible with Dexcom.  Consider medication  like Ozempic or Munjaro in future. ( Off note-- she was not able to tolerate Victoza before)  Follow up with Melody BARRERA in 3-4 months with labs prior.  Repeat labs and follow up with  in 6-7 months.  Please make a lab appointment for blood work and follow up clinic appointment in 1 week after  that to discuss results.      2. Hypertension - Under Good control.  On lisinopril.    3. Hyperlipidemia - On simvastatin 20 mg/day. LDL 59    4. Prevention:  Opthalmology- recommend annually  ASA-not indicated.  Smoking- No    Most Recent Immunizations   Administered Date(s) Administered     COVID-19 12+ (2023-24) (Pfizer) 11/07/2023     COVID-19 Bivalent 18+ (Moderna) 10/19/2022     COVID-19 MONOVALENT 12+ (Pfizer) 12/28/2021     COVID-19 Vaccine (Bouchra) 12/28/2021     HEPATITIS A (PEDS 12M-18Y) 02/21/2000     Hepatitis B, Adult 09/11/2013     Hepatitis B, Peds 04/12/1999     Influenza (IIV3) PF 09/11/2013     Influenza Vaccine >6 months,quad, PF 12/22/2022     Influenza Vaccine, 6+MO IM (QUADRIVALENT W/PRESERVATIVES) 11/26/2019     MMR 08/05/1992     Pneumococcal 23 valent 11/17/2008     TDAP (Adacel,Boostrix) 04/26/2023     Td (Adult), Adsorbed 11/10/1995         Recommend checking blood sugars before meals and at bedtime.    If Blood glucose are low more often-> 2-3 times/week- give us a call.  The patient is advised to Make better food choices: reduce carbs, Reduce portion size, weight loss and exercise 3-4 times a week.  Discussed hypoglycemia signs and symptoms as well as management in detail.    There is some variability among people, most will usually develop symptoms suggestive of hypoglycemia when blood glucose levels are lowered to the mid 60's. The first set of symptoms are called adrenergic. Patients may experience any of the following nervousness, sweating, intense hunger, trembling, weakness, palpitations, and difficulty speaking.   The acute management of hypoglycemia involves the rapid delivery of a source of easily absorbed sugar. Regular soda, juice, lifesavers, table sugar, are good options. 15 grams of glucose is the dose that is given, followed by an assessment of symptoms and a blood glucose check if possible. If after 10 minutes there is no improvement, another 10-15 grams should be  given. This can be repeated up to three times. The equivalency of 10-15 grams of glucose (approximate servings) are: Four lifesavers, 4 teaspoons of sugar, or 1/2 can of regular soda or juice.     All questions were answered.  The patient indicates understanding of the above issues and agrees with the plan set forth.     Follow-up:  As noted in AVS    Nayana Salinas M.D  Endocrinology  [facility]  CC: Damien Contreras    Disclaimer: This note consists of symbols derived from keyboarding, dictation and/or voice recognition software. As a result, there may be errors in the script that have gone undetected. Please consider this when interpreting information found in this chart.    Addendum to above note and clinic visit:    Labs reviewed.    See result note/telephone encounter.                  Again, thank you for allowing me to participate in the care of your patient.        Sincerely,        Nayana Salinas MD

## 2023-12-05 NOTE — PROGRESS NOTES
"THIS IS A VIDEO VISIT:    Phone call visit/virtual visit encounter:    Name of patient: Clarence Boyle    Date of encounter: 12/5/2023    Time of start of video visit: 3:02    Video started: 3:07    Video ended: 3:23    Provider location: working from home/ [facility]    Patient location: patients home.    Mode of transmission: Experience Headphones video/ Airtime    Verbal consent: obtained before starting visit. Pt is agreeable.      The patient has been notified of following:      \"This VIDEO visit will be conducted via a call between you and your physician/provider. We have found that certain health care needs can be provided without the need for a physical exam.  This service lets us provide the care you need with a short phone conversation.  If a prescription is necessary we can send it directly to your pharmacy.  If lab work is needed we can place an order for that and you can then stop by our lab to have the test done at a later time.     With new updates with corona virus patient might be billed as clinic visit.     If during the course of the call the physician/provider feels a telephone visit is not appropriate, you will not be charged for this service.\"      Past medical history, social history, family history, allergy and medications were reviewed and updated as appropriate.  Reviewed pertinent labs, notes, imaging studies personally.    ENDOCRINOLOGY CLINIC NOTE:  Name: Clarence Boyle  Self referral for Diabetes.  HPI:  Clarence Boyle is a 43 year old male who presents for the evaluation/management of Diabetes.   has a past medical history of Diabetes (H), Hypertension, and Obese.    Was seen by  before at [facility].  Was followed by CDE closely as well.  Available records, labs and images from outside clinic were personally reviewed.    Reports on and off low BG.  Appetite is OK.  Weight: mostly stable.    Has never been on insulin pump.  Using DEXCOM.  Twisted ankle- activity limited in last few weeks.    Wt " Readings from Last 2 Encounters:   04/26/23 119.7 kg (264 lb)   03/02/22 116.9 kg (257 lb 11.2 oz)       1. Type 2 DM:  Orginally diagnosed at the age of: in 30s.  Current Regimen:   Metformin XR 2000 mg with dinner.  Jardiance 25 mg/day  Semglee 50 units at dinner time.  Humalog 2 unit/10 gm of CHO + ssi 1:50> 140.   (1/2 of correction at beditme)  (about 18-20 units TID)    yes:     Diabetes Medication(s)       Biguanides       metFORMIN (GLUCOPHAGE XR) 500 MG 24 hr tablet    TAKE 4 TABLETS(2000 MG) BY MOUTH DAILY WITH DINNER      Insulin       Insulin Lispro (HUMALOG KWIKPEN) 200 UNIT/ML soln    INJECT 2 UNITS/10 GRAMS CARBS UNDER SKIN AS DIRECTED WITH CORRECTION 1 UNIT/50 ABOVE 140. TAKE 1/2 CORRECTION AT BEDTIME. ABOUT 80 UNITS DAILY     SEMGLEE, YFGN, 100 UNIT/ML SOPN    ADMINISTER 54 UNITS UNDER THE SKIN DAILY      Sodium-Glucose Co-Transporter 2 (SGLT2) Inhibitors       JARDIANCE 25 MG TABS tablet    TAKE ONE TABLET BY MOUTH ONCE DAILY          Victoza-- had GI s/e.    BS checks: DEXCOM  Average Meter Download: Blood glucose data reviewed personally. See nursing note from this encounter for details.  Last A1c: 9.0%  Symptoms of hypoglycemia (low blood sugar):  Gets symptoms of hypoglycemia.    DM Complications:   Complications:   Diabetes Complications  Description / Detail    Diabetic Retinopathy  No   CAD / PAD  No   Neuropathy  No   Nephropathy / Microalbuminuria  No   Gastroparesis  No   Hypoglycemia [place]arness  No       2. Hypertension:    on medications  3. Hyperlipidemia: on medications  Medications       HMG CoA Reductase Inhibitors     simvastatin (ZOCOR) 20 MG tablet               PMH/PSH:  Past Medical History:   Diagnosis Date    Diabetes (H)     Hypertension     Obese      Past Surgical History:   Procedure Laterality Date    URETHROPLASTY STAGE ONE WITH BUCCAL GRAFT N/A 5/2/2018    Procedure: URETHROPLASTY STAGE ONE WITH BUCCAL GRAFT;  Posterior Urethroplasty with Single Buccal Mucosa Graft;   Surgeon: Herb Awad MD;  Location: [facility] OR     Family Hx:  Family History   Problem Relation Age of Onset    Cancer Mother         Gynecologic    Thyroid Disease Mother     No Known Problems Father     Cancer Maternal Grandmother     Diabetes Paternal Grandmother     Mental Illness Paternal Grandmother     Colon Cancer Cousin         Approximately late 30s       Diabetes: maternal aunt, p.grandmother.    Social Hx:  Social History     Socioeconomic History    Marital status:      Spouse name: Not on file    Number of children: Not on file    Years of education: Not on file    Highest education level: Not on file   Occupational History    Not on file   Tobacco Use    Smoking status: Never     Passive exposure: Never    Smokeless tobacco: Never   Vaping Use    Vaping Use: Never used   Substance and Sexual Activity    Alcohol use: Yes     Comment: 2-3/month    Drug use: No    Sexual activity: Yes     Partners: Female   Other Topics Concern    Not on file   Social History Narrative    Not on file     Social Determinants of Health     Financial Resource Strain: Not on file   Food Insecurity: Not on file   Transportation Needs: Not on file   Physical Activity: Not on file   Stress: Not on file   Social Connections: Not on file   Interpersonal Safety: Not on file   Housing Stability: Not on file          MEDICATIONS:  has a current medication list which includes the following prescription(s): blood glucose, blood glucose monitoring, dexcom g6 sensor, dexcom g7 sensor, dexcom g6 transmitter, humalog kwikpen, bd maty u/f, jardiance, lisinopril, loratadine, metformin, multiple vitamins-minerals, nystatin, semglee (yfgn), and simvastatin.    ROS     ROS: 10 point ROS neg other than the symptoms noted above in the HPI.    Physical Exam   VS: There were no vitals taken for this visit.  GENERAL: healthy, alert and no distress  EYES: Eyes grossly normal to inspection, conjunctivae and sclerae normal  ENT: no nose  swelling, nasal discharge.  Thyroid: no apparent thyroid nodules  RESP: no audible wheeze, cough, or visible cyanosis.  No visible retractions or increased work of breathing.  Able to speak fully in complete sentences.  ABDO: not evaluated.  EXTREMITIES: no hand tremors.  NEURO: Cranial nerves grossly intact, mentation intact and speech normal  SKIN: No apparent skin lesions, rash or edema seen   PSYCH: mentation appears normal, affect normal/bright, judgement and insight intact, normal speech and appearance well-groomed    LABS:  A1c:  Lab Results   Component Value Date    A1C 7.3 09/02/2023    A1C 7.1 04/18/2023    A1C 9.0 03/02/2022    A1C 8.3 11/04/2021    A1C 8.4 04/28/2020    A1C 7.7 01/10/2019     Creatinine:  Creatinine   Date Value Ref Range Status   04/26/2023 0.97 0.67 - 1.17 mg/dL Final   04/28/2020 0.65 (L) 0.66 - 1.25 mg/dL Final   ]    Urine Micro:  Lab Results   Component Value Date    UMALCR  04/26/2023      Comment:      Unable to calculate, urine albumin and/or urine creatinine is outside detectable limits.  Microalbuminuria is defined as an albumin:creatinine ratio of 17 to 299 for males and 25 to 299 for females. A ratio of albumin:creatinine of 300 or higher is indicative of overt proteinuria.  Due to biologic variability, positive results should be confirmed by a second, first-morning random or 24-hour timed urine specimen. If there is discrepancy, a third specimen is recommended. When 2 out of 3 results are in the microalbuminuria range, this is evidence for incipient nephropathy and warrants increased efforts at glucose control, blood pressure control, and institution of therapy with an angiotensin-converting-enzyme (ACE) inhibitor (if the patient can tolerate it).      UMALCR  03/02/2022      Comment:      Unable to calculate:  Urine creatinine or albumin value below detectable level    UMALCR Unable to calculate due to low value 04/28/2020          LFTs/Lipids:  Recent Labs   Lab Test  04/26/23  0905 03/02/22  0848   CHOL 127 142   HDL 49 58   LDL 59 60   TRIG 93 120     TFTs:  TSH   Date Value Ref Range Status   04/26/2023 2.83 0.30 - 4.20 uIU/mL Final   03/02/2022 2.20 0.40 - 4.00 mU/L Final     All pertinent notes, labs, and images personally reviewed by me.     Glucometer/ insulin pump (if applicable)/ CGM data (if applicable) downloaded, Personally reviewed and interpreted.  All Blood sugar data reviewed personally and discussed with pt.      A/P  Mr.Ryan PHONG Boyle is a 43 year old here for the evaluation/management of diabetes:    1. DM2 - Under poor control.  A1c 7.3%  No know complications.  BG high during day.  Plan:  Discussed diagnosis, pathophysiology, management and treatment options of condition with pt.  I also discussed importance of strict blood sugar control to prevent complications associated with uncontrolled diabetes.  Labs needed.  Continue Metformin XR 2000 mg with dinner.  Continue Jardiance 25 mg/day  Continue Semglee 50 units/day but take in in AM  Continue Humalog 2 unit/10 gm of CHO + ssi 1:50> 140.   (1/2 of correction at beditme)  Continue to use DEXCOM  Consider insulin pump-- Omnipod and Tandem are compatible with Dexcom.  Consider medication  like Ozempic or Munjaro in future. ( Off note-- she was not able to tolerate Victoza before)  Follow up with Melody BARRERA in 3-4 months with labs prior.  Repeat labs and follow up with  in 6-7 months.  Please make a lab appointment for blood work and follow up clinic appointment in 1 week after that to discuss results.      2. Hypertension - Under Good control.  On lisinopril.    3. Hyperlipidemia - On simvastatin 20 mg/day. LDL 59    4. Prevention:  Opthalmology- recommend annually  ASA-not indicated.  Smoking- No    Most Recent Immunizations   Administered Date(s) Administered    COVID-19 12+ (2023-24) (Pfizer) 11/07/2023    COVID-19 Bivalent 18+ (Moderna) 10/19/2022    COVID-19 MONOVALENT 12+ (Pfizer) 12/28/2021     COVID-19 Vaccine (Bouchra) 12/28/2021    HEPATITIS A (PEDS 12M-18Y) 02/21/2000    Hepatitis B, Adult 09/11/2013    Hepatitis B, Peds 04/12/1999    Influenza (IIV3) PF 09/11/2013    Influenza Vaccine >6 months,quad, PF 12/22/2022    Influenza Vaccine, 6+MO IM (QUADRIVALENT W/PRESERVATIVES) 11/26/2019    MMR 08/05/1992    Pneumococcal 23 valent 11/17/2008    TDAP (Adacel,Boostrix) 04/26/2023    Td (Adult), Adsorbed 11/10/1995         Recommend checking blood sugars before meals and at bedtime.    If Blood glucose are low more often-> 2-3 times/week- give us a call.  The patient is advised to Make better food choices: reduce carbs, Reduce portion size, weight loss and exercise 3-4 times a week.  Discussed hypoglycemia signs and symptoms as well as management in detail.    There is some variability among people, most will usually develop symptoms suggestive of hypoglycemia when blood glucose levels are lowered to the mid 60's. The first set of symptoms are called adrenergic. Patients may experience any of the following nervousness, sweating, intense hunger, trembling, weakness, palpitations, and difficulty speaking.   The acute management of hypoglycemia involves the rapid delivery of a source of easily absorbed sugar. Regular soda, juice, lifesavers, table sugar, are good options. 15 grams of glucose is the dose that is given, followed by an assessment of symptoms and a blood glucose check if possible. If after 10 minutes there is no improvement, another 10-15 grams should be given. This can be repeated up to three times. The equivalency of 10-15 grams of glucose (approximate servings) are: Four lifesavers, 4 teaspoons of sugar, or 1/2 can of regular soda or juice.     All questions were answered.  The patient indicates understanding of the above issues and agrees with the plan set forth.     Follow-up:  As noted in AVSAMIR Salinas M.D  Endocrinology  [facility]  CC: Damien Contreras  G    Disclaimer: This note consists of symbols derived from keyboarding, dictation and/or voice recognition software. As a result, there may be errors in the script that have gone undetected. Please consider this when interpreting information found in this chart.    Addendum to above note and clinic visit:    Labs reviewed.    See result note/telephone encounter.

## 2023-12-05 NOTE — NURSING NOTE
Is the patient currently in the state of [place]? YES    Visit mode:VIDEO    If the visit is dropped, the patient can be reconnected by: VIDEO VISIT: Text to cell phone:   Telephone Information:   Mobile [phone]       Will anyone else be joining the visit? NO  (If patient encounters technical issues they should call [phone]    How would you like to obtain your AVS? MyChart    Are changes needed to the allergy or medication list? Pt stated no changes to allergies and Pt stated no med changes    Reason for visit: Follow Up    Shraddha Morrell LPN LPN

## 2023-12-05 NOTE — PATIENT INSTRUCTIONS
[facility]  Dr Salinas, Endocrinology Department    [facility]   [address]  [place] [address]  Appointment [phone]  Fax: [phone]  [place]: Monday - Thursday      To provide the best diabetic care, please bring your blood glucose meter to each and every visit with your Endocrinologist.  Your blood glucose meter/insulin pump will be downloaded at every appointment.    Please arrive 15 minutes before your scheduled appointment.  This will allow for your blood glucose meter/insulin pump to be downloaded.  If you are wearing DEXCOM please bring  or sharing code so that it can be downloaded.  If you are using freestyle aleshia personal sensors please bring the reader.  If you are using TANDEM insulin pump please have your username and password to get info from Tandem website.    Labs needed.  Continue Metformin XR 2000 mg with dinner.  Continue Jardiance 25 mg/day  Continue Semglee 50 units/day but take in in AM  Continue Humalog 2 unit/10 gm of CHO + ssi 1:50> 140.   (1/2 of correction at beditme)  Continue to use DEXCOM  Consider insulin pump-- Omnipod and Tandem are compatible with Dexcom.  Consider medication  like Ozempic or Munjaro in future.    Follow up with Melody BARRERA in 3-4 months with labs prior.  ( She is a new Physician assistant who is at [facility] on Monday and  and will see follow up Diabetes patients)  Repeat labs and follow up with  in 6-7 months.  Please make a lab appointment for blood work and follow up clinic appointment in 1 week after that to discuss results.    Recommend checking blood sugars before meals and at bedtime.    If Blood glucose are low more often-> 2-3 times/week- give us a call.  Make better food choices: reduce carbs, Reduce portion size, weight loss and exercise 3-4 times a week.    What is hypoglycemia:  Hypoglycemia is when blood sugar levels become too low -  below 70 m/dl.      What causes hypoglycemia?  - using too much insulin  -taking too many diabetes pills  -not eating enough, or skipping meals or snacks  -not eating enough carbohydrate with meals  -changing your exercise routine  -drinking alcohol in excess    It is also possible to have hypoglycemia even when you are carefully managing your blood sugar levels.    What does it feel like when blood sugars get too low?  You may feel:  - anxious  -confused  -dizzy  -hungry  -light-headed  -nervous  -shaky  -sleepy  -sweaty    You may have  -blurred or cloudy vision  -heart palpitations (heart skips a beat or races)  -tingling or numbness around the mouth and tongue  -tremors    What to do if you have symptoms of hypoglycmemia:  If you think your blood sugar is too low, check it with a glucose meter.  If its below 70 mg/dl, consume one of the following:  Fruit juice (1/2 cup)  Glucose tablets (15 grams)  Hard candy (5 to 7 pieces)  Honey or sugar (2 teaspoons)  Milk (1/2 cup)  Soft drink (non-diet, 1/2 cup)    Wait 15 minutes and check your blood glucose again.  IF it is still below 70 mg/dl, have another food item listed above. Wait another 15 minutes and repeat the blood glucose test.  Have a small meal or snack that contains some carbohydrate after your blood glucose rises above 70 mg/dl.    If you are at risk of hypoglycemia, always carry with you glucose tablets or one of the foods listed above.      To prevent Hypoglycemia:  Avoid situations that may cause hypoglycemia  Before making any change to your diet or exercise routine, discuss them with your healthcare provider  Keep a record of your blood glucose levels.  Include the time of day, diabetes medications, when you had your last meal or snack, and what you were doing at the time (e.g. Watching TV, gardening, jogging, etc).    Talk to your healthcare provider if your blood glucose levels are often low        Patient guide on  hypoglycemia    http://www.hormone.org/Resources/upload/patient-guide-diagnosis-and-management-hypoglycemia-[email or web address]

## 2023-12-07 ENCOUNTER — TELEPHONE (OUTPATIENT)
Dept: ENDOCRINOLOGY | Facility: CLINIC | Age: 43
End: 2023-12-07
Payer: COMMERCIAL

## 2023-12-07 NOTE — TELEPHONE ENCOUNTER
[facility]    LAST OFFICE/VIRTUAL VISIT:  12/05/23    FUTURE OFFICE/VIRTUAL VISIT:  03/28/24    Lab Results   Component Value Date    A1C 7.3 09/02/2023    A1C 7.1 04/18/2023    A1C 9.0 03/02/2022    A1C 8.3 11/04/2021    A1C 8.4 04/28/2020    A1C 7.7 01/10/2019           Rosie Meier [facility] [place]  [phone]

## 2023-12-07 NOTE — TELEPHONE ENCOUNTER
Forms/paperwork reviewed, completed and signed.  Please fax or send the papers as requested, document in chart and close the encounter.    Thank you.    Nayana Salinas MD

## 2023-12-07 NOTE — TELEPHONE ENCOUNTER
Form was faxed and sent to scanning.      Rosie Meier, [facility] Endocrinology  [place]

## 2023-12-12 ENCOUNTER — LAB (OUTPATIENT)
Dept: LAB | Facility: CLINIC | Age: 43
End: 2023-12-12
Payer: COMMERCIAL

## 2023-12-12 DIAGNOSIS — E11.65 TYPE 2 DIABETES MELLITUS WITH HYPERGLYCEMIA, WITH LONG-TERM CURRENT USE OF INSULIN (H): ICD-10-CM

## 2023-12-12 DIAGNOSIS — Z79.4 TYPE 2 DIABETES MELLITUS WITH HYPERGLYCEMIA, WITH LONG-TERM CURRENT USE OF INSULIN (H): ICD-10-CM

## 2023-12-12 LAB — HBA1C MFR BLD: 7 % (ref 0–5.6)

## 2023-12-12 PROCEDURE — 83036 HEMOGLOBIN GLYCOSYLATED A1C: CPT

## 2023-12-12 PROCEDURE — 36415 COLL VENOUS BLD VENIPUNCTURE: CPT

## 2023-12-13 NOTE — RESULT ENCOUNTER NOTE
Clarence    Recently done endocrinology lab test/ imaging test showed:  Lab Results       Component                Value               Date                       A1C                      7.0                 12/12/2023                 A1C                      7.3                 09/02/2023              Here is a copy for your records.  A1c is a measure of Blood glucose control over 3 months. Goal A1c is < 7.0%.    Follow up as discussed in last clinic visit.    Please call endocrinology clinic ( [phone]) if questions.    Nayana Salinas MD  Endocrinology   [facility]  December 12, 2023

## 2023-12-21 DIAGNOSIS — E11.65 TYPE 2 DIABETES MELLITUS WITH HYPERGLYCEMIA, UNSPECIFIED WHETHER LONG TERM INSULIN USE (H): ICD-10-CM

## 2023-12-21 RX ORDER — PROCHLORPERAZINE 25 MG/1
SUPPOSITORY RECTAL
Qty: 3 EACH | Refills: 2 | Status: SHIPPED | OUTPATIENT
Start: 2023-12-21 | End: 2024-03-28

## 2024-01-01 DIAGNOSIS — E11.9 TYPE 2 DIABETES MELLITUS WITHOUT COMPLICATION, UNSPECIFIED WHETHER LONG TERM INSULIN USE (H): ICD-10-CM

## 2024-01-01 DIAGNOSIS — E66.01 MORBID OBESITY (H): ICD-10-CM

## 2024-01-02 RX ORDER — METFORMIN HCL 500 MG
2000 TABLET, EXTENDED RELEASE 24 HR ORAL
Qty: 360 TABLET | Refills: 0 | Status: SHIPPED | OUTPATIENT
Start: 2024-01-02 | End: 2024-04-03

## 2024-01-02 NOTE — TELEPHONE ENCOUNTER
"    Requested Prescriptions   Pending Prescriptions Disp Refills    metFORMIN (GLUCOPHAGE XR) 500 MG 24 hr tablet [Pharmacy Med Name: METFORMIN ER 500MG 24HR TABS] 360 tablet 0     Sig: TAKE 4 TABLETS(2000 MG) BY MOUTH DAILY WITH DINNER       Biguanide Agents Passed - 1/1/2024  5:16 AM        Passed - Patient is age 10 or older        Passed - Patient has documented A1c within the specified period of time.     If HgbA1C is 8 or greater, it needs to be on file within the past 3 months.  If less than 8, must be on file within the past 6 months.     Recent Labs   Lab Test 12/12/23  1551 04/28/20  0904 09/23/19  0000   A1C 7.0*   < >  --    HEMOGLOBINA1  --   --  7.5*    < > = values in this interval not displayed.             Passed - Patient's CR is NOT>1.4 OR Patient's EGFR is NOT<45 within past 12 mos.     Recent Labs   Lab Test 04/26/23  0905 11/04/21  1241 04/28/20  0904   GFRESTIMATED >90   < > >90   GFRESTBLACK  --   --  >90    < > = values in this interval not displayed.       Recent Labs   Lab Test 04/26/23  0905   CR 0.97             Passed - Patient does NOT have a diagnosis of CHF.        Passed - Medication is active on med list        Passed - Recent (6 mo) or future (30 days) visit within the authorizing provider's specialty     Patient had office visit in the last 6 months or has a visit in the next 30 days with authorizing provider or within the authorizing provider's specialty.  See \"Patient Info\" tab in inbasket, or \"Choose Columns\" in Meds & Orders section of the refill encounter.                 "

## 2024-02-16 ENCOUNTER — TELEPHONE (OUTPATIENT)
Dept: ENDOCRINOLOGY | Facility: CLINIC | Age: 44
End: 2024-02-16
Payer: COMMERCIAL

## 2024-02-16 NOTE — TELEPHONE ENCOUNTER
PA Initiation    Medication: DEXCOM G6 SENSOR MISC  Insurance Company: [employer] - Phone [phone] Fax [phone]  Pharmacy Filling the Rx:    Filling Pharmacy Phone:    Filling Pharmacy Fax:    Start Date: 2/16/2024

## 2024-03-04 DIAGNOSIS — E66.01 MORBID OBESITY (H): ICD-10-CM

## 2024-03-04 DIAGNOSIS — E11.9 TYPE 2 DIABETES MELLITUS WITHOUT COMPLICATION, UNSPECIFIED WHETHER LONG TERM INSULIN USE (H): ICD-10-CM

## 2024-03-05 RX ORDER — SIMVASTATIN 20 MG
20 TABLET ORAL DAILY
Qty: 90 TABLET | Refills: 0 | Status: SHIPPED | OUTPATIENT
Start: 2024-03-05 | End: 2024-06-10

## 2024-03-06 DIAGNOSIS — E11.65 TYPE 2 DIABETES MELLITUS WITH HYPERGLYCEMIA, UNSPECIFIED WHETHER LONG TERM INSULIN USE (H): ICD-10-CM

## 2024-03-06 RX ORDER — INSULIN GLARGINE-YFGN 100 [IU]/ML
50 INJECTION, SOLUTION SUBCUTANEOUS
Qty: 45 ML | Refills: 0 | Status: SHIPPED | OUTPATIENT
Start: 2024-03-06 | End: 2024-06-12

## 2024-03-06 NOTE — TELEPHONE ENCOUNTER
Requested Prescriptions   Pending Prescriptions Disp Refills    SEMGLEE (YFGN) 100 UNIT/ML SOPN [Pharmacy Med Name: SEMGLEE (YFGN)100U/ML PF  PEN 3ML] 30 mL      Sig: INJECT 54 UNITS UNDER THE SKIN DAILY       There is no refill protocol information for this order

## 2024-03-23 DIAGNOSIS — Z79.4 TYPE 2 DIABETES MELLITUS WITH HYPERGLYCEMIA, WITH LONG-TERM CURRENT USE OF INSULIN (H): ICD-10-CM

## 2024-03-23 DIAGNOSIS — E11.65 TYPE 2 DIABETES MELLITUS WITH HYPERGLYCEMIA, WITH LONG-TERM CURRENT USE OF INSULIN (H): ICD-10-CM

## 2024-03-25 RX ORDER — EMPAGLIFLOZIN 25 MG/1
25 TABLET, FILM COATED ORAL DAILY
Qty: 90 TABLET | Refills: 0 | Status: SHIPPED | OUTPATIENT
Start: 2024-03-25 | End: 2024-06-13

## 2024-03-25 NOTE — TELEPHONE ENCOUNTER
Requested Prescriptions   Pending Prescriptions Disp Refills    JARDIANCE 25 MG TABS tablet [Pharmacy Med Name: JARDIANCE 25MG TABS] 90 tablet 1     Sig: TAKE ONE TABLET BY MOUTH ONCE DAILY       Sodium Glucose Co-Transport Inhibitor Agents Passed - 3/23/2024  9:12 PM        Passed - Patient has documented A1c within the specified period of time.     If HgbA1C is 8 or greater, it needs to be on file within the past 3 months.  If less than 8, must be on file within the past 6 months.     Recent Labs   Lab Test 12/12/23  1551 04/28/20  0904 09/23/19  0000   A1C 7.0*   < >  --    HEMOGLOBINA1  --   --  7.5*    < > = values in this interval not displayed.             Passed - Medication is active on med list        Passed - Has GFR on file in past 12 months and most recent value is normal        Passed - Recent (6 mo) or future (90 days) visit within the authorizing provider's specialty     The patient must have completed an in-person or virtual visit within the past 6 months or has a future visit scheduled within the next 90 days with the authorizing provider s specialty.  Urgent care and e-visits do not quality as an office visit for this protocol.          Passed - Medication indicated for associated diagnosis     Medication is associated with one or more of the following diagnoses:     Diabetic nephropathy, With Albuminuria - Type 2 diabetes mellitus     Disorder of cardiovascular system; Prophylaxis - Type 2 diabetes mellitus     Type 2 diabetes mellitus    Disorder of cardiovascular system; Prophylaxis - Heart failure   Chronic kidney disease, (At risk of progression) to reduce the risk of sustained   estimated GFR decline, end-stage kidney disease, cardiovascular death,   and hospitalization for heart failure     Heart failure, (NYHA class II to IV, reduced ejection fraction) to reduce risk of  cardiovascular death and hospitalization           Passed - Patient is age 18 or older        Passed - Patient has  documented normal Potassium within the last 12 mos.     Recent Labs   Lab Test 04/26/23  0905   POTASSIUM 4.9

## 2024-03-28 DIAGNOSIS — E11.9 TYPE 2 DIABETES MELLITUS WITHOUT COMPLICATION, UNSPECIFIED WHETHER LONG TERM INSULIN USE (H): ICD-10-CM

## 2024-03-28 DIAGNOSIS — E11.65 TYPE 2 DIABETES MELLITUS WITH HYPERGLYCEMIA, UNSPECIFIED WHETHER LONG TERM INSULIN USE (H): ICD-10-CM

## 2024-03-28 RX ORDER — PROCHLORPERAZINE 25 MG/1
SUPPOSITORY RECTAL
Qty: 1 EACH | Refills: 0 | Status: SHIPPED | OUTPATIENT
Start: 2024-03-28

## 2024-03-28 RX ORDER — PROCHLORPERAZINE 25 MG/1
SUPPOSITORY RECTAL
Qty: 9 EACH | Refills: 0 | Status: SHIPPED | OUTPATIENT
Start: 2024-03-28 | End: 2024-07-08

## 2024-03-28 NOTE — TELEPHONE ENCOUNTER
"Requested Prescriptions   Pending Prescriptions Disp Refills    Continuous Blood Gluc Sensor (DEXCOM G6 SENSOR) MISC [Pharmacy Med Name: DEXCOM G6 SENSOR  MISC]  2     Sig: CHANGE EVERY 10 DAYS       There is no refill protocol information for this order       Continuous Blood Gluc Transmit (DEXCOM G6 TRANSMITTER) MISC [Pharmacy Med Name: DEXCOM G6 TRANSMITTER  MISC] 1 each 1     Sig: CHANGE EVERY 90 DAYS       Diabetic Supplies Protocol Passed - 3/28/2024 10:58 AM        Passed - Medication is active on med list        Passed - Medication indicated for associated diagnosis        Passed - Patient is 18 years of age or older        Passed - Recent (6 mo) or future (30 days) visit within the authorizing provider's specialty     Patient had office visit in the last 6 months or has a visit in the next 30 days with authorizing provider.  See \"Patient Info\" tab in inbasket, or \"Choose Columns\" in Meds & Orders section of the refill encounter.                 "

## 2024-04-03 DIAGNOSIS — E66.01 MORBID OBESITY (H): ICD-10-CM

## 2024-04-03 DIAGNOSIS — E11.9 TYPE 2 DIABETES MELLITUS WITHOUT COMPLICATION, UNSPECIFIED WHETHER LONG TERM INSULIN USE (H): ICD-10-CM

## 2024-04-03 RX ORDER — METFORMIN HCL 500 MG
2000 TABLET, EXTENDED RELEASE 24 HR ORAL
Qty: 360 TABLET | Refills: 0 | Status: SHIPPED | OUTPATIENT
Start: 2024-04-03 | End: 2024-07-08

## 2024-04-03 NOTE — TELEPHONE ENCOUNTER
Requested Prescriptions   Pending Prescriptions Disp Refills    metFORMIN (GLUCOPHAGE XR) 500 MG 24 hr tablet [Pharmacy Med Name: METFORMIN ER 500MG 24HR TABS] 360 tablet 0     Sig: TAKE 4 TABLETS(2000 MG) BY MOUTH DAILY WITH DINNER       Biguanide Agents Passed - 4/3/2024  5:14 AM        Passed - Patient is age 10 or older        Passed - Patient has documented A1c within the specified period of time.     If HgbA1C is 8 or greater, it needs to be on file within the past 3 months.  If less than 8, must be on file within the past 6 months.     Recent Labs   Lab Test 12/12/23  1551 04/28/20  0904 09/23/19  0000   A1C 7.0*   < >  --    HEMOGLOBINA1  --   --  7.5*    < > = values in this interval not displayed.             Passed - Patient does NOT have a diagnosis of CHF.        Passed - Medication is active on med list        Passed - Medication indicated for associated diagnosis     Medication is associated with one or more of the following diagnoses:     Gestational diabetes mellitus     Hyperinsulinar obesity     Hypersecretion of ovarian androgens    Non-alcoholic fatty liver    Polycystic ovarian syndrome               Pre-diabetes (DM 2 prevention)    Type 2 diabetes mellitus     Weight gain, antipsychotic therapy-induced             Passed - Has GFR on file in past 12 months and most recent value is normal        Passed - Recent (6 mo) or future (90 days) visit within the authorizing provider's specialty     The patient must have completed an in-person or virtual visit within the past 6 months or has a future visit scheduled within the next 90 days with the authorizing provider s specialty.  Urgent care and e-visits do not quality as an office visit for this protocol.

## 2024-04-29 SDOH — HEALTH STABILITY: PHYSICAL HEALTH: ON AVERAGE, HOW MANY MINUTES DO YOU ENGAGE IN EXERCISE AT THIS LEVEL?: 20 MIN

## 2024-04-29 SDOH — HEALTH STABILITY: PHYSICAL HEALTH: ON AVERAGE, HOW MANY DAYS PER WEEK DO YOU ENGAGE IN MODERATE TO STRENUOUS EXERCISE (LIKE A BRISK WALK)?: 2 DAYS

## 2024-04-29 ASSESSMENT — SOCIAL DETERMINANTS OF HEALTH (SDOH): HOW OFTEN DO YOU GET TOGETHER WITH FRIENDS OR RELATIVES?: ONCE A WEEK

## 2024-04-30 ENCOUNTER — OFFICE VISIT (OUTPATIENT)
Dept: INTERNAL MEDICINE | Facility: CLINIC | Age: 44
End: 2024-04-30
Attending: INTERNAL MEDICINE
Payer: COMMERCIAL

## 2024-04-30 VITALS
SYSTOLIC BLOOD PRESSURE: 124 MMHG | BODY MASS INDEX: 37.87 KG/M2 | WEIGHT: 264.5 LBS | DIASTOLIC BLOOD PRESSURE: 89 MMHG | HEIGHT: 70 IN | OXYGEN SATURATION: 97 % | TEMPERATURE: 97.2 F | RESPIRATION RATE: 16 BRPM | HEART RATE: 95 BPM

## 2024-04-30 DIAGNOSIS — E11.9 TYPE 2 DIABETES MELLITUS WITHOUT COMPLICATION, WITH LONG-TERM CURRENT USE OF INSULIN (H): ICD-10-CM

## 2024-04-30 DIAGNOSIS — Z79.4 TYPE 2 DIABETES MELLITUS WITHOUT COMPLICATION, WITH LONG-TERM CURRENT USE OF INSULIN (H): ICD-10-CM

## 2024-04-30 DIAGNOSIS — Z00.00 ENCOUNTER FOR PREVENTATIVE ADULT HEALTH CARE EXAMINATION: Primary | ICD-10-CM

## 2024-04-30 DIAGNOSIS — E66.01 MORBID OBESITY (H): ICD-10-CM

## 2024-04-30 DIAGNOSIS — E11.9 TYPE 2 DIABETES MELLITUS WITHOUT COMPLICATION, UNSPECIFIED WHETHER LONG TERM INSULIN USE (H): ICD-10-CM

## 2024-04-30 DIAGNOSIS — I10 PRIMARY HYPERTENSION: ICD-10-CM

## 2024-04-30 LAB
ALBUMIN SERPL BCG-MCNC: 4.5 G/DL (ref 3.5–5.2)
ALP SERPL-CCNC: 89 U/L (ref 40–150)
ALT SERPL W P-5'-P-CCNC: 13 U/L (ref 0–70)
ANION GAP SERPL CALCULATED.3IONS-SCNC: 11 MMOL/L (ref 7–15)
AST SERPL W P-5'-P-CCNC: 19 U/L (ref 0–45)
BILIRUB SERPL-MCNC: 0.4 MG/DL
BUN SERPL-MCNC: 20.7 MG/DL (ref 6–20)
CALCIUM SERPL-MCNC: 9.3 MG/DL (ref 8.6–10)
CHLORIDE SERPL-SCNC: 103 MMOL/L (ref 98–107)
CHOLEST SERPL-MCNC: 144 MG/DL
CREAT SERPL-MCNC: 0.82 MG/DL (ref 0.67–1.17)
CREAT UR-MCNC: 84.4 MG/DL
DEPRECATED HCO3 PLAS-SCNC: 25 MMOL/L (ref 22–29)
EGFRCR SERPLBLD CKD-EPI 2021: >90 ML/MIN/1.73M2
ERYTHROCYTE [DISTWIDTH] IN BLOOD BY AUTOMATED COUNT: 14.1 % (ref 10–15)
FASTING STATUS PATIENT QL REPORTED: YES
GLUCOSE SERPL-MCNC: 187 MG/DL (ref 70–99)
HCT VFR BLD AUTO: 49.3 % (ref 40–53)
HDLC SERPL-MCNC: 58 MG/DL
HGB BLD-MCNC: 16.2 G/DL (ref 13.3–17.7)
LDLC SERPL CALC-MCNC: 69 MG/DL
MCH RBC QN AUTO: 28.6 PG (ref 26.5–33)
MCHC RBC AUTO-ENTMCNC: 32.9 G/DL (ref 31.5–36.5)
MCV RBC AUTO: 87 FL (ref 78–100)
MICROALBUMIN UR-MCNC: <12 MG/L
MICROALBUMIN/CREAT UR: NORMAL MG/G{CREAT}
NONHDLC SERPL-MCNC: 86 MG/DL
PLATELET # BLD AUTO: 226 10E3/UL (ref 150–450)
POTASSIUM SERPL-SCNC: 4.7 MMOL/L (ref 3.4–5.3)
PROT SERPL-MCNC: 7.5 G/DL (ref 6.4–8.3)
RBC # BLD AUTO: 5.66 10E6/UL (ref 4.4–5.9)
SODIUM SERPL-SCNC: 139 MMOL/L (ref 135–145)
TRIGL SERPL-MCNC: 87 MG/DL
TSH SERPL DL<=0.005 MIU/L-ACNC: 3.38 UIU/ML (ref 0.3–4.2)
WBC # BLD AUTO: 8.4 10E3/UL (ref 4–11)

## 2024-04-30 PROCEDURE — 36415 COLL VENOUS BLD VENIPUNCTURE: CPT | Performed by: INTERNAL MEDICINE

## 2024-04-30 PROCEDURE — 80061 LIPID PANEL: CPT | Performed by: INTERNAL MEDICINE

## 2024-04-30 PROCEDURE — 82570 ASSAY OF URINE CREATININE: CPT | Performed by: INTERNAL MEDICINE

## 2024-04-30 PROCEDURE — 84443 ASSAY THYROID STIM HORMONE: CPT | Performed by: INTERNAL MEDICINE

## 2024-04-30 PROCEDURE — 85027 COMPLETE CBC AUTOMATED: CPT | Performed by: INTERNAL MEDICINE

## 2024-04-30 PROCEDURE — 99396 PREV VISIT EST AGE 40-64: CPT | Performed by: INTERNAL MEDICINE

## 2024-04-30 PROCEDURE — 99214 OFFICE O/P EST MOD 30 MIN: CPT | Mod: 25 | Performed by: INTERNAL MEDICINE

## 2024-04-30 PROCEDURE — 80053 COMPREHEN METABOLIC PANEL: CPT | Performed by: INTERNAL MEDICINE

## 2024-04-30 PROCEDURE — 83036 HEMOGLOBIN GLYCOSYLATED A1C: CPT | Performed by: INTERNAL MEDICINE

## 2024-04-30 PROCEDURE — 82043 UR ALBUMIN QUANTITATIVE: CPT | Performed by: INTERNAL MEDICINE

## 2024-04-30 RX ORDER — LISINOPRIL 10 MG/1
TABLET ORAL
Qty: 90 TABLET | Refills: 3 | Status: SHIPPED | OUTPATIENT
Start: 2024-04-30

## 2024-04-30 ASSESSMENT — PAIN SCALES - GENERAL: PAINLEVEL: NO PAIN (0)

## 2024-04-30 NOTE — PROGRESS NOTES
"Preventive Care Visit  [facility]  Damien Contreras MD, Internal Medicine  Apr 30, 2024      Assessment & Plan     Type 2 diabetes mellitus without complication, with long-term current use of insulin (H)  Continue treatment, try split doses of insulin Semgee - 30 units am , 20 units pm   - Lipid panel reflex to direct LDL Non-fasting  - Albumin Random Urine Quantitative with Creat Ratio  - OFFICE/OUTPT VISIT,EST,LEVL III    Primary hypertension  Controlled  Continue treatment   - Comprehensive metabolic panel (BMP + Alb, Alk Phos, ALT, AST, Total. Bili, TP)  - TSH with free T4 reflex  - CBC with platelets  - OFFICE/OUTPT VISIT,EST,LEVL III    Type 2 diabetes mellitus without complication, unspecified whether long term insulin use (H)  Assess microalbumin   - lisinopril (ZESTRIL) 10 MG tablet; TAKE 1 TABLET(10 MG) BY MOUTH DAILY WITH DINNER    Morbid obesity (H)  Consider GLP1  - lisinopril (ZESTRIL) 10 MG tablet; TAKE 1 TABLET(10 MG) BY MOUTH DAILY WITH DINNER    Encounter for preventative adult health care examination  advised regular aerobic activity, low cholesterol, low salt diet, wearing seat belt,  self examinations, sunscreen protection.Obtain screening cholesterol, immunizations reviewed.    - Comprehensive metabolic panel (BMP + Alb, Alk Phos, ALT, AST, Total. Bili, TP)  - TSH with free T4 reflex  - CBC with platelets    Patient has been advised of split billing requirements and indicates understanding: Yes          BMI  Estimated body mass index is 38.22 kg/m  as calculated from the following:    Height as of this encounter: 1.772 m (5' 9.75\").    Weight as of this encounter: 120 kg (264 lb 8 oz).   Weight management plan: Discussed healthy diet and exercise guidelines    Counseling  Appropriate preventive services were discussed with this patient, including applicable screening as appropriate for fall prevention, nutrition, physical activity, Tobacco-use cessation, weight loss " and cognition.  Checklist reviewing preventive services available has been given to the patient.  Reviewed patient's diet, addressing concerns and/or questions.   He is at risk for lack of exercise and has been provided with information to increase physical activity for the benefit of his well-being.   He is at risk for psychosocial distress and has been provided with information to reduce risk.       See Patient Instructions    Radha Lima is a 44 year old, presenting for the following:  Physical        4/30/2024     7:27 AM   Additional Questions   Roomed by Alexa DARLING   Accompanied by n/a        Health Care Directive  Patient does not have a Health Care Directive or Living Will: Discussed advance care planning with patient; however, patient declined at this time.    HPI    No acute complaints, no medication change or new medical conditions.  Has H/O DM. On diet , exercise and Insulin, Metformin, Gardiance. Blood sugars are controlled. No parestesias. Has had rare hypoglycemias.  Has H/O hyperlipidemia. On medical treatment and diet. No side effects. No muscle weakness, myalgias or upset stomach.   Has h/o HTN. on medical treatment. BP has been controlled. No side effects from medications. No CP, HA, dizziness. good compliance with medications and low salt diet.  Interested in vasectomy. Will call when referral needed.             4/29/2024   General Health   How would you rate your overall physical health? Good   Feel stress (tense, anxious, or unable to sleep) Only a little   (!) STRESS CONCERN      4/29/2024   Nutrition   Three or more servings of calcium each day? Yes   Diet: Diabetic   How many servings of fruit and vegetables per day? (!) 0-1   How many sweetened beverages each day? 0-1         4/29/2024   Exercise   Days per week of moderate/strenous exercise 2 days   Average minutes spent exercising at this level 20 min   (!) EXERCISE CONCERN      4/29/2024   Social Factors   Frequency of gathering  with friends or relatives Once a week   Worry food won't last until get money to buy more No   Food not last or not have enough money for food? No   Do you have housing?  Yes   Are you worried about losing your housing? No   Lack of transportation? No   Unable to get utilities (heat,electricity)? No         4/29/2024   Dental   Dentist two times every year? Yes         4/29/2024   TB Screening   Were you born outside of the [place]? No         Today's PHQ-2 Score:       4/29/2024     4:36 PM   PHQ-2 ( 1999 [employer])   Q1: Little interest or pleasure in doing things 1   Q2: Feeling down, depressed or hopeless 1   PHQ-2 Score 2   Q1: Little interest or pleasure in doing things Several days   Q2: Feeling down, depressed or hopeless Several days   PHQ-2 Score 2           4/29/2024   Substance Use   Alcohol more than 3/day or more than 7/wk No   Do you use any other substances recreationally? No     Social History     Tobacco Use    Smoking status: Never     Passive exposure: Never    Smokeless tobacco: Never   Vaping Use    Vaping status: Never Used   Substance Use Topics    Alcohol use: Yes     Comment: 2-3/month    Drug use: No           4/29/2024   STI Screening   New sexual partner(s) since last STI/HIV test? No   ASCVD Risk   The ASCVD Risk score (Juan VILLASENOR, et al., 2019) failed to calculate for the following reasons:    The valid total cholesterol range is 130 to 320 mg/dL        4/29/2024   Contraception/Family Planning   Questions about contraception or family planning (!) YES         Reviewed and updated as needed this visit by Provider                    Lab work is in process  Labs reviewed in EPIC      Review of Systems  Constitutional, HEENT, cardiovascular, pulmonary, GI, , musculoskeletal, neuro, skin, endocrine and psych systems are negative, except as otherwise noted.     Objective    Exam  /89 (BP Location: Right arm, Cuff Size: Adult Large)   Pulse 95   Temp 97.2  F (36.2  C) (Tympanic)    "Resp 16   Ht 1.772 m (5' 9.75\")   Wt 120 kg (264 lb 8 oz)   SpO2 97%   BMI 38.22 kg/m     Estimated body mass index is 38.22 kg/m  as calculated from the following:    Height as of this encounter: 1.772 m (5' 9.75\").    Weight as of this encounter: 120 kg (264 lb 8 oz).    Physical Exam  GENERAL: alert and no distress  EYES: Eyes grossly normal to inspection, PERRL and conjunctivae and sclerae normal  HENT: ear canals and TM's normal, nose and mouth without ulcers or lesions  NECK: no adenopathy, no asymmetry, masses, or scars  RESP: lungs clear to auscultation - no rales, rhonchi or wheezes  CV: regular rate and rhythm, normal S1 S2, no S3 or S4, no murmur, click or rub, no peripheral edema  ABDOMEN: soft, nontender, no hepatosplenomegaly, no masses and bowel sounds normal  MS: no gross musculoskeletal defects noted, no edema  SKIN: no suspicious lesions or rashes  NEURO: Normal strength and tone, mentation intact and speech normal  PSYCH: mentation appears normal, affect normal/bright        Signed Electronically by: Damien Contreras MD    "

## 2024-04-30 NOTE — LETTER
May 1, 2024      [place]rence [place]  [id number]  [facility] [address]        Dear ,    We are writing to inform you of your test results.    Your labs show slightly worsened diabetic control. Try to improve diet and exercise.     Resulted Orders   Lipid panel reflex to direct LDL Non-fasting   Result Value Ref Range    Cholesterol 144 <200 mg/dL    Triglycerides 87 <150 mg/dL    Direct Measure HDL 58 >=40 mg/dL    LDL Cholesterol Calculated 69 <=100 mg/dL    Non HDL Cholesterol 86 <130 mg/dL    Patient Fasting > 8hrs? Yes     Narrative    Cholesterol  Desirable:  <200 mg/dL    Triglycerides  Normal:  Less than 150 mg/dL  Borderline High:  150-199 mg/dL  High:  200-499 mg/dL  Very High:  Greater than or equal to 500 mg/dL    Direct Measure HDL  Female:  Greater than or equal to 50 mg/dL   Male:  Greater than or equal to 40 mg/dL    LDL Cholesterol  Desirable:  <100mg/dL  Above Desirable:  100-129 mg/dL   Borderline High:  130-159 mg/dL   High:  160-189 mg/dL   Very High:  >= 190 mg/dL    Non HDL Cholesterol  Desirable:  130 mg/dL  Above Desirable:  130-159 mg/dL  Borderline High:  160-189 mg/dL  High:  190-219 mg/dL  Very High:  Greater than or equal to 220 mg/dL   Albumin Random Urine Quantitative with Creat Ratio   Result Value Ref Range    Creatinine Urine mg/dL 84.4 mg/dL      Comment:      The reference ranges have not been established in urine creatinine. The results should be integrated into the clinical context for interpretation.    Albumin Urine mg/L <12.0 mg/L      Comment:      The reference ranges have not been established in urine albumin. The results should be integrated into the clinical context for interpretation.    Albumin Urine mg/g Cr        Comment:      Unable to calculate, urine albumin and/or urine creatinine is outside detectable limits.  Microalbuminuria is defined as an albumin:creatinine ratio of 17 to 299 for males and 25 to 299 for females. A ratio of albumin:creatinine of 300 or  higher is indicative of overt proteinuria.  Due to biologic variability, positive results should be confirmed by a second, first-morning random or 24-hour timed urine specimen. If there is discrepancy, a third specimen is recommended. When 2 out of 3 results are in the microalbuminuria range, this is evidence for incipient nephropathy and warrants increased efforts at glucose control, blood pressure control, and institution of therapy with an angiotensin-converting-enzyme (ACE) inhibitor (if the patient can tolerate it).     Comprehensive metabolic panel (BMP + Alb, Alk Phos, ALT, AST, Total. Bili, TP)   Result Value Ref Range    Sodium 139 135 - 145 mmol/L      Comment:      Reference intervals for this test were updated on 09/26/2023 to more accurately reflect our healthy population. There may be differences in the flagging of prior results with similar values performed with this method. Interpretation of those prior results can be made in the context of the updated reference intervals.     Potassium 4.7 3.4 - 5.3 mmol/L    Carbon Dioxide (CO2) 25 22 - 29 mmol/L    Anion Gap 11 7 - 15 mmol/L    Urea Nitrogen 20.7 (H) 6.0 - 20.0 mg/dL    Creatinine 0.82 0.67 - 1.17 mg/dL    GFR Estimate >90 >60 mL/min/1.73m2    Calcium 9.3 8.6 - 10.0 mg/dL    Chloride 103 98 - 107 mmol/L    Glucose 187 (H) 70 - 99 mg/dL    Alkaline Phosphatase 89 40 - 150 U/L      Comment:      Reference intervals for this test were updated on 11/14/2023 to more accurately reflect our healthy population. There may be differences in the flagging of prior results with similar values performed with this method. Interpretation of those prior results can be made in the context of the updated reference intervals.    AST 19 0 - 45 U/L      Comment:      Reference intervals for this test were updated on 6/12/2023 to more accurately reflect our healthy population. There may be differences in the flagging of prior results with similar values performed with  this method. Interpretation of those prior results can be made in the context of the updated reference intervals.    ALT 13 0 - 70 U/L      Comment:      Reference intervals for this test were updated on 6/12/2023 to more accurately reflect our healthy population. There may be differences in the flagging of prior results with similar values performed with this method. Interpretation of those prior results can be made in the context of the updated reference intervals.      Protein Total 7.5 6.4 - 8.3 g/dL    Albumin 4.5 3.5 - 5.2 g/dL    Bilirubin Total 0.4 <=1.2 mg/dL   TSH with free T4 reflex   Result Value Ref Range    TSH 3.38 0.30 - 4.20 uIU/mL   CBC with platelets   Result Value Ref Range    WBC Count 8.4 4.0 - 11.0 10e3/uL    RBC Count 5.66 4.40 - 5.90 10e6/uL    Hemoglobin 16.2 13.3 - 17.7 g/dL    Hematocrit 49.3 40.0 - 53.0 %    MCV 87 78 - 100 fL    MCH 28.6 26.5 - 33.0 pg    MCHC 32.9 31.5 - 36.5 g/dL    RDW 14.1 10.0 - 15.0 %    Platelet Count 226 150 - 450 10e3/uL   Hemoglobin A1c   Result Value Ref Range    Hemoglobin A1C 7.2 (H) 0.0 - 5.6 %      Comment:      Normal <5.7%   Prediabetes 5.7-6.4%    Diabetes 6.5% or higher     Note: Adopted from ADA consensus guidelines.       If you have any questions or concerns, please call the clinic at the number listed above.       Sincerely,      Damien Contreras MD

## 2024-05-01 LAB — HBA1C MFR BLD: 7.2 % (ref 0–5.6)

## 2024-06-03 NOTE — PROGRESS NOTES
Outcome for 06/03/24 10:07 AM: Data uploaded on Dexcom  Shraddha Morrell LPN   Outcome for 06/07/24 8:38 AM: Dexcom emailed to provider  Shraddha Morrell LPN

## 2024-06-08 DIAGNOSIS — E11.9 TYPE 2 DIABETES MELLITUS WITHOUT COMPLICATION, UNSPECIFIED WHETHER LONG TERM INSULIN USE (H): ICD-10-CM

## 2024-06-08 DIAGNOSIS — E66.01 MORBID OBESITY (H): ICD-10-CM

## 2024-06-08 DIAGNOSIS — E11.65 TYPE 2 DIABETES MELLITUS WITH HYPERGLYCEMIA, UNSPECIFIED WHETHER LONG TERM INSULIN USE (H): ICD-10-CM

## 2024-06-10 RX ORDER — SIMVASTATIN 20 MG
20 TABLET ORAL DAILY
Qty: 90 TABLET | Refills: 1 | Status: SHIPPED | OUTPATIENT
Start: 2024-06-10

## 2024-06-10 NOTE — TELEPHONE ENCOUNTER
Follow up 6/11/24    Requested Prescriptions   Pending Prescriptions Disp Refills    SEMGLEE (YFGN) 100 UNIT/ML SOPN [Pharmacy Med Name: SEMGLEE (YFGN)100U/ML PF  PEN 3ML] 45 mL 0     Sig: ADMINISTER 50 UNITS UNDER THE SKIN DAILY BEFORE BREAKFAST       Insulin Protocol Failed - 6/8/2024  8:25 AM        Failed - Chart Review Required     Review Chart.    Do not approve if insulin is used in a pump.  Instead, direct refill request to the patient's endocrinologist.  If the patient doesn't have an endocrinologist, then send the refill to the patient's PCP for review            Passed - Medication is active on med list        Passed - Has GFR on file in past 12 months and most recent value is normal        Passed - Recent (6 mo) or future (90 days) visit within the authorizing provider's specialty     The patient must have completed an in-person or virtual visit within the past 6 months or has a future visit scheduled within the next 90 days with the authorizing provider s specialty.  Urgent care and e-visits do not quality as an office visit for this protocol.          Passed - Medication indicated for associated diagnosis     Medication is associated with one or more of the following diagnoses:   - Type 1 diabetes mellitus  - Type 2 diabetes mellitus  - Diabetic nephropathy; Prophylaxis  - Neuropathy due to diabetes mellitus; Prophylaxis  - Retinopathy due to diabetes mellitus; Prophylaxis  - Diabetes mellitus associated with cystic fibrosis  - Disorder of cardiovascular system; Prophylaxis - Type 1 diabetes mellitus   - Disorder of cardiovascular system; Prophylaxis - Type 2 diabetes mellitus            Passed - Patient is 18 years of age or older

## 2024-06-11 ENCOUNTER — VIRTUAL VISIT (OUTPATIENT)
Dept: ENDOCRINOLOGY | Facility: CLINIC | Age: 44
End: 2024-06-11
Payer: COMMERCIAL

## 2024-06-11 DIAGNOSIS — Z79.4 TYPE 2 DIABETES MELLITUS WITH HYPERGLYCEMIA, WITH LONG-TERM CURRENT USE OF INSULIN (H): Primary | ICD-10-CM

## 2024-06-11 DIAGNOSIS — E11.65 TYPE 2 DIABETES MELLITUS WITH HYPERGLYCEMIA, WITH LONG-TERM CURRENT USE OF INSULIN (H): Primary | ICD-10-CM

## 2024-06-11 DIAGNOSIS — Z79.4 LONG-TERM INSULIN USE (H): ICD-10-CM

## 2024-06-11 DIAGNOSIS — E78.5 HYPERLIPIDEMIA, UNSPECIFIED HYPERLIPIDEMIA TYPE: ICD-10-CM

## 2024-06-11 PROCEDURE — 99214 OFFICE O/P EST MOD 30 MIN: CPT | Mod: 95 | Performed by: INTERNAL MEDICINE

## 2024-06-11 PROCEDURE — 95251 CONT GLUC MNTR ANALYSIS I&R: CPT | Performed by: INTERNAL MEDICINE

## 2024-06-11 PROCEDURE — G2211 COMPLEX E/M VISIT ADD ON: HCPCS | Mod: 95 | Performed by: INTERNAL MEDICINE

## 2024-06-11 NOTE — PROGRESS NOTES
"THIS IS A VIDEO VISIT:    Phone call visit/virtual visit encounter:    Name of patient: Clarence Boyle    Date of encounter: 6/11/2024    Time of start of video visit: 1:03    Video started: 1:12    Video ended: 1:30    Provider location: working from home/ [facility]    Patient location: patients home.    Mode of transmission: [employer] video/ "[employer]    Verbal consent: obtained before starting visit. Pt is agreeable.      The patient has been notified of following:      \"This VIDEO visit will be conducted via a call between you and your physician/provider. We have found that certain health care needs can be provided without the need for a physical exam.  This service lets us provide the care you need with a short phone conversation.  If a prescription is necessary we can send it directly to your pharmacy.  If lab work is needed we can place an order for that and you can then stop by our lab to have the test done at a later time.     With new updates with corona virus patient might be billed as clinic visit.     If during the course of the call the physician/provider feels a telephone visit is not appropriate, you will not be charged for this service.\"      Past medical history, social history, family history, allergy and medications were reviewed and updated as appropriate.  Reviewed pertinent labs, notes, imaging studies personally.    ENDOCRINOLOGY CLINIC NOTE:  Name: Clarence Boyle  Self referral for Diabetes.  HPI:  Clarence Boyle is a 44 year old male who presents for the evaluation/management of Diabetes.   has a past medical history of Diabetes (H), Hypertension, and Obese.    Was seen by  before at [facility].  Was followed by CDE closely as well.  Available records, labs and images from outside clinic were personally reviewed.    Reports on and off low BG.  Appetite is OK.  Weight: mostly stable.    Has never been on insulin pump. He might be interested in future which is compatible with Dexcom G7. But " currently he is OK with MDI.  He had GI s/e on Victoza.  Using DEXCOM G7.    Wt Readings from Last 2 Encounters:   04/30/24 120 kg (264 lb 8 oz)   04/26/23 119.7 kg (264 lb)       1. Type 2 DM:  Orginally diagnosed at the age of: in 30s.  Current Regimen:   Metformin XR 2000 mg with dinner.  Jardiance 25 mg/day  Semglee 26 units BID  Humalog 2 unit/10 gm of CHO + ssi 1:50> 140.   (1/2 of correction at beditme)  (about 18-20 units TID)    yes:     Diabetes Medication(s)       Biguanides       metFORMIN (GLUCOPHAGE XR) 500 MG 24 hr tablet TAKE 4 TABLETS(2000 MG) BY MOUTH DAILY WITH DINNER       Insulin       Insulin Glargine-yfgn (SEMGLEE, YFGN,) 100 UNIT/ML SOPN Inject 50 Units Subcutaneous daily before breakfast     Insulin Lispro (HUMALOG KWIKPEN) 200 UNIT/ML soln INJECT 2 UNITS/10 GRAMS CARBS UNDER SKIN AS DIRECTED WITH CORRECTION 1 UNIT/50 ABOVE 140. TAKE 1/2 CORRECTION AT BEDTIME. ABOUT 80 UNITS DAILY       Sodium-Glucose Co-Transporter 2 (SGLT2) Inhibitors       JARDIANCE 25 MG TABS tablet TAKE ONE TABLET BY MOUTH ONCE DAILY          Victoza-- had GI s/e.    BS checks: DEXCOM  Average Meter Download: Blood glucose data reviewed personally. See nursing note from this encounter for details.  Last A1c: 7.2%  Symptoms of hypoglycemia (low blood sugar):  Gets symptoms of hypoglycemia.    DM Complications:   Complications:   Diabetes Complications  Description / Detail    Diabetic Retinopathy  No   CAD / PAD  No   Neuropathy  No   Nephropathy / Microalbuminuria  No   Gastroparesis  No   Hypoglycemia [place]  No       2. Hypertension:    on medications  3. Hyperlipidemia: on medications  Medications       HMG CoA Reductase Inhibitors     simvastatin (ZOCOR) 20 MG tablet            PMH/PSH:  Past Medical History:   Diagnosis Date    Diabetes (H)     Hypertension     Obese      Past Surgical History:   Procedure Laterality Date    GENITOURINARY SURGERY      Stricture repair    URETHROPLASTY STAGE ONE WITH BUCCAL  GRAFT N/A 05/02/2018    Procedure: URETHROPLASTY STAGE ONE WITH BUCCAL GRAFT;  Posterior Urethroplasty with Single Buccal Mucosa Graft;  Surgeon: Herb Awad MD;  Location: [facility] OR     Family Hx:  Family History   Problem Relation Age of Onset    Cancer Mother         Gynecologic    Thyroid Disease Mother     Colon Cancer Mother     Depression Mother     Obesity Mother     No Known Problems Father     Cancer Maternal Grandmother     Diabetes Paternal Grandmother     Mental Illness Paternal Grandmother     Colon Cancer Cousin         Approximately late 30s    Other Cancer Maternal Grandfather     Diabetes Other     Depression Sister        Diabetes: maternal aunt, p.grandmother.    Social Hx:  Social History     Socioeconomic History    Marital status:      Spouse name: Not on file    Number of children: Not on file    Years of education: Not on file    Highest education level: Not on file   Occupational History    Not on file   Tobacco Use    Smoking status: Never     Passive exposure: Never    Smokeless tobacco: Never   Vaping Use    Vaping status: Never Used   Substance and Sexual Activity    Alcohol use: Yes     Comment: 2-3/month    Drug use: No    Sexual activity: Yes     Partners: Female     Birth control/protection: Pill   Other Topics Concern    Parent/sibling w/ CABG, MI or angioplasty before 65F 55M? No   Social History Narrative    Not on file     Social Determinants of Health     Financial Resource Strain: Low Risk  (4/29/2024)    Financial Resource Strain     Within the past 12 months, have you or your family members you live with been unable to get utilities (heat, electricity) when it was really needed?: No   Food Insecurity: Low Risk  (4/29/2024)    Food Insecurity     Within the past 12 months, did you worry that your food would run out before you got money to buy more?: No     Within the past 12 months, did the food you bought just not last and you didn t have money to get more?: No    Transportation Needs: Low Risk  (4/29/2024)    Transportation Needs     Within the past 12 months, has lack of transportation kept you from medical appointments, getting your medicines, non-medical meetings or appointments, work, or from getting things that you need?: No   Physical Activity: Insufficiently Active (4/29/2024)    Exercise Vital Sign     Days of Exercise per Week: 2 days     Minutes of Exercise per Session: 20 min   Stress: No Stress Concern Present (4/29/2024)    [employer] - Occupational Stress Questionnaire     Feeling of Stress : Only a little   Social Connections: Unknown (4/29/2024)    Social Connection and Isolation Panel [NHANES]     Frequency of Communication with Friends and Family: Not on file     Frequency of Social Gatherings with Friends and Family: Once a week     Attends Protestant Services: Not on file     Active Member of Clubs or Organizations: Not on file     Attends Club or Organization Meetings: Not on file     Marital Status: Not on file   Interpersonal Safety: Low Risk  (4/30/2024)    Interpersonal Safety     Do you feel physically and emotionally safe where you currently live?: Yes     Within the past 12 months, have you been hit, slapped, kicked or otherwise physically hurt by someone?: No     Within the past 12 months, have you been humiliated or emotionally abused in other ways by your partner or ex-partner?: No   Housing Stability: Low Risk  (4/29/2024)    Housing Stability     Do you have housing? : Yes     Are you worried about losing your housing?: No          MEDICATIONS:  has a current medication list which includes the following prescription(s): blood glucose, blood glucose monitoring, dexcom g6 sensor, dexcom g7 sensor, dexcom g6 transmitter, insulin glargine-yfgn, humalog kwikpen, bd maty u/f, jardiance, lisinopril, loratadine, metformin, multiple vitamins-minerals, nystatin, and simvastatin.    ROS     ROS: 10 point ROS neg other than  the symptoms noted above in the HPI.    Physical Exam   VS: There were no vitals taken for this visit.  GENERAL: healthy, alert and no distress  EYES: Eyes grossly normal to inspection, conjunctivae and sclerae normal  ENT: no nose swelling, nasal discharge.  Thyroid: no apparent thyroid nodules  RESP: no audible wheeze, cough, or visible cyanosis.  No visible retractions or increased work of breathing.  Able to speak fully in complete sentences.  ABDO: not evaluated.  EXTREMITIES: no hand tremors.  NEURO: Cranial nerves grossly intact, mentation intact and speech normal  SKIN: No apparent skin lesions, rash or edema seen   PSYCH: mentation appears normal, affect normal/bright, judgement and insight intact, normal speech and appearance well-groomed    LABS:  A1c:  Lab Results   Component Value Date    A1C 7.2 04/30/2024    A1C 7.0 12/12/2023    A1C 7.3 09/02/2023    A1C 7.1 04/18/2023    A1C 9.0 03/02/2022    A1C 8.4 04/28/2020     Creatinine:  Creatinine   Date Value Ref Range Status   04/30/2024 0.82 0.67 - 1.17 mg/dL Final   04/28/2020 0.65 (L) 0.66 - 1.25 mg/dL Final     Urine Micro:  Lab Results   Component Value Date    UMALCR  04/30/2024      Comment:      Unable to calculate, urine albumin and/or urine creatinine is outside detectable limits.  Microalbuminuria is defined as an albumin:creatinine ratio of 17 to 299 for males and 25 to 299 for females. A ratio of albumin:creatinine of 300 or higher is indicative of overt proteinuria.  Due to biologic variability, positive results should be confirmed by a second, first-morning random or 24-hour timed urine specimen. If there is discrepancy, a third specimen is recommended. When 2 out of 3 results are in the microalbuminuria range, this is evidence for incipient nephropathy and warrants increased efforts at glucose control, blood pressure control, and institution of therapy with an angiotensin-converting-enzyme (ACE) inhibitor (if the patient can tolerate it).       UMALCR  03/02/2022      Comment:      Unable to calculate:  Urine creatinine or albumin value below detectable level    UMALCR Unable to calculate due to low value 04/28/2020       LFTs/Lipids:  Recent Labs   Lab Test 04/30/24  0800 04/26/23  0905   CHOL 144 127   HDL 58 49   LDL 69 59   TRIG 87 93     TFTs:  TSH   Date Value Ref Range Status   04/30/2024 3.38 0.30 - 4.20 uIU/mL Final   03/02/2022 2.20 0.40 - 4.00 mU/L Final     All pertinent notes, labs, and images personally reviewed by me.     Glucometer/ insulin pump (if applicable)/ CGM data (if applicable) downloaded, Personally reviewed and interpreted.  All Blood sugar data reviewed personally and discussed with pt.      A/P  Mr.Ryan PHONG Boyle is a 44 year old here for the evaluation/management of diabetes:    1. DM2 - Under poor control.  A1c 7.2%  No know complications.  BG high during day.  Plan:  Discussed diagnosis, pathophysiology, management and treatment options of condition with pt.  I also discussed importance of strict blood sugar control to prevent complications associated with uncontrolled diabetes.  Continue Metformin XR 2000 mg with dinner.  Continue Jardiance 25 mg/day  Semglee-- take 28 units in AM and decrease to 24 units PM  Continue Humalog 2 unit/10 gm of CHO + ssi 1:50> 140.   Decrease carbs with meals  Continue to use Dexcom G7.  Consider medication like Ozempic or Munjaro. Please inform us if you are interested and if covered by insurance.  Labs and follow up in 6-7 months.  Please make a lab appointment for blood work and follow up clinic appointment in 1 week after that to discuss results.    2. Hypertension - Under Good control.  On lisinopril.    3. Hyperlipidemia - On simvastatin 20 mg/day. LDL 59    4. Prevention:  Opthalmology- recommend annually  ASA-not indicated.  Smoking- No    Most Recent Immunizations   Administered Date(s) Administered    COVID-19 12+ (2023-24) (Pfizer) 11/07/2023    COVID-19 Bivalent 18+ (Moderna)  10/19/2022    COVID-19 MONOVALENT 12+ (Pfizer) 12/28/2021    COVID-19 Vaccine (Bouchra) 12/28/2021    HEPATITIS A (PEDS 12M-18Y) 02/21/2000    Hepatitis B, Adult 09/11/2013    Hepatitis B, Peds 04/12/1999    Influenza (IIV3) PF 09/11/2013    Influenza Vaccine >6 months,quad, PF 12/22/2022    Influenza Vaccine, 6+MO IM (QUADRIVALENT W/PRESERVATIVES) 11/26/2019    Influenza,INJ,MDCK,PF,Quad >6mo(Flucelvax) 11/07/2023    MMR 08/05/1992    Pneumococcal 23 valent 11/17/2008    TDAP (Adacel,Boostrix) 04/26/2023    Td (Adult), Adsorbed 11/10/1995         Recommend checking blood sugars before meals and at bedtime.    If Blood glucose are low more often-> 2-3 times/week- give us a call.  The patient is advised to Make better food choices: reduce carbs, Reduce portion size, weight loss and exercise 3-4 times a week.  Discussed hypoglycemia signs and symptoms as well as management in detail.    There is some variability among people, most will usually develop symptoms suggestive of hypoglycemia when blood glucose levels are lowered to the mid 60's. The first set of symptoms are called adrenergic. Patients may experience any of the following nervousness, sweating, intense hunger, trembling, weakness, palpitations, and difficulty speaking.   The acute management of hypoglycemia involves the rapid delivery of a source of easily absorbed sugar. Regular soda, juice, lifesavers, table sugar, are good options. 15 grams of glucose is the dose that is given, followed by an assessment of symptoms and a blood glucose check if possible. If after 10 minutes there is no improvement, another 10-15 grams should be given. This can be repeated up to three times. The equivalency of 10-15 grams of glucose (approximate servings) are: Four lifesavers, 4 teaspoons of sugar, or 1/2 can of regular soda or juice.     Discussed indications, risks and benefits of all medications prescribed, and answered questions to patient's satisfaction.  The  longitudinal plan of care for the diagnosis(es)/condition(s) as documented were addressed during this visit. Due to the added complexity in care, I will continue to support Clarence in the subsequent management and with ongoing continuity of care.  All questions were answered.  The patient indicates understanding of the above issues and agrees with the plan set forth.     Follow-up:  As noted in AVS    Nayana Salinas M.D  Endocrinology  [facility]  CC: Damien Contreras    Disclaimer: This note consists of symbols derived from keyboarding, dictation and/or voice recognition software. As a result, there may be errors in the script that have gone undetected. Please consider this when interpreting information found in this chart.    Addendum to above note and clinic visit:    Labs reviewed.    See result note/telephone encounter.

## 2024-06-11 NOTE — LETTER
"6/11/2024      Clarence Boyle  [address]  [facility] [address]      Dear Colleague,    Thank you for referring your patient, Clarence Boyle, to the [facility]. Please see a copy of my visit note below.    Outcome for 06/03/24 10:07 AM: Data uploaded on Dexcom  Shraddha Morrell LPN   Outcome for 06/07/24 8:38 AM: Dexcom emailed to provider  Shraddha Morrell LPN           THIS IS A VIDEO VISIT:    Phone call visit/virtual visit encounter:    Name of patient: Clarence Boyle    Date of encounter: 6/11/2024    Time of start of video visit: 1:03    Video started: 1:12    Video ended: 1:30    Provider location: working from home/ [facility]    Patient location: patients home.    Mode of transmission: Zulahoo video/ Shoppable    Verbal consent: obtained before starting visit. Pt is agreeable.      The patient has been notified of following:      \"This VIDEO visit will be conducted via a call between you and your physician/provider. We have found that certain health care needs can be provided without the need for a physical exam.  This service lets us provide the care you need with a short phone conversation.  If a prescription is necessary we can send it directly to your pharmacy.  If lab work is needed we can place an order for that and you can then stop by our lab to have the test done at a later time.     With new updates with corona virus patient might be billed as clinic visit.     If during the course of the call the physician/provider feels a telephone visit is not appropriate, you will not be charged for this service.\"      Past medical history, social history, family history, allergy and medications were reviewed and updated as appropriate.  Reviewed pertinent labs, notes, imaging studies personally.    ENDOCRINOLOGY CLINIC NOTE:  Name: Clarence Boyle  Self referral for Diabetes.  HPI:  Clarence Boyle is a 44 year old male who presents for the evaluation/management of Diabetes.   has a past medical history " of Diabetes (H), Hypertension, and Obese.    Was seen by  before at [facility].  Was followed by CDE closely as well.  Available records, labs and images from outside clinic were personally reviewed.    Reports on and off low BG.  Appetite is OK.  Weight: mostly stable.    Has never been on insulin pump. He might be interested in future which is compatible with Dexcom G7. But currently he is OK with MDI.  He had GI s/e on Victoza.  Using DEXCOM G7.    Wt Readings from Last 2 Encounters:   04/30/24 120 kg (264 lb 8 oz)   04/26/23 119.7 kg (264 lb)       1. Type 2 DM:  Orginally diagnosed at the age of: in 30s.  Current Regimen:   Metformin XR 2000 mg with dinner.  Jardiance 25 mg/day  Semglee 26 units BID  Humalog 2 unit/10 gm of CHO + ssi 1:50> 140.   (1/2 of correction at beditme)  (about 18-20 units TID)    yes:     Diabetes Medication(s)       Biguanides       metFORMIN (GLUCOPHAGE XR) 500 MG 24 hr tablet TAKE 4 TABLETS(2000 MG) BY MOUTH DAILY WITH DINNER       Insulin       Insulin Glargine-yfgn (SEMGLEE, YFGN,) 100 UNIT/ML SOPN Inject 50 Units Subcutaneous daily before breakfast     Insulin Lispro (HUMALOG KWIKPEN) 200 UNIT/ML soln INJECT 2 UNITS/10 GRAMS CARBS UNDER SKIN AS DIRECTED WITH CORRECTION 1 UNIT/50 ABOVE 140. TAKE 1/2 CORRECTION AT BEDTIME. ABOUT 80 UNITS DAILY       Sodium-Glucose Co-Transporter 2 (SGLT2) Inhibitors       JARDIANCE 25 MG TABS tablet TAKE ONE TABLET BY MOUTH ONCE DAILY          Victoza-- had GI s/e.    BS checks: DEXCOM  Average Meter Download: Blood glucose data reviewed personally. See nursing note from this encounter for details.  Last A1c: 7.2%  Symptoms of hypoglycemia (low blood sugar):  Gets symptoms of hypoglycemia.    DM Complications:   Complications:   Diabetes Complications  Description / Detail    Diabetic Retinopathy  No   CAD / PAD  No   Neuropathy  No   Nephropathy / Microalbuminuria  No   Gastroparesis  No   Hypoglycemia [place]arness  No       2. Hypertension:    on  medications  3. Hyperlipidemia: on medications  Medications       HMG CoA Reductase Inhibitors     simvastatin (ZOCOR) 20 MG tablet            PMH/PSH:  Past Medical History:   Diagnosis Date     Diabetes (H)      Hypertension      Obese      Past Surgical History:   Procedure Laterality Date     GENITOURINARY SURGERY      Stricture repair     URETHROPLASTY STAGE ONE WITH BUCCAL GRAFT N/A 05/02/2018    Procedure: URETHROPLASTY STAGE ONE WITH BUCCAL GRAFT;  Posterior Urethroplasty with Single Buccal Mucosa Graft;  Surgeon: Herb Awad MD;  Location: [facility] OR     Family Hx:  Family History   Problem Relation Age of Onset     Cancer Mother         Gynecologic     Thyroid Disease Mother      Colon Cancer Mother      Depression Mother      Obesity Mother      No Known Problems Father      Cancer Maternal Grandmother      Diabetes Paternal Grandmother      Mental Illness Paternal Grandmother      Colon Cancer Cousin         Approximately late 30s     Other Cancer Maternal Grandfather      Diabetes Other      Depression Sister        Diabetes: maternal aunt, p.grandmother.    Social Hx:  Social History     Socioeconomic History     Marital status:      Spouse name: Not on file     Number of children: Not on file     Years of education: Not on file     Highest education level: Not on file   Occupational History     Not on file   Tobacco Use     Smoking status: Never     Passive exposure: Never     Smokeless tobacco: Never   Vaping Use     Vaping status: Never Used   Substance and Sexual Activity     Alcohol use: Yes     Comment: 2-3/month     Drug use: No     Sexual activity: Yes     Partners: Female     Birth control/protection: Pill   Other Topics Concern     Parent/sibling w/ CABG, MI or angioplasty before 65F 55M? No   Social History Narrative     Not on file     Social Determinants of Health     Financial Resource Strain: Low Risk  (4/29/2024)    Financial Resource Strain      Within the past 12 months,  have you or your family members you live with been unable to get utilities (heat, electricity) when it was really needed?: No   Food Insecurity: Low Risk  (4/29/2024)    Food Insecurity      Within the past 12 months, did you worry that your food would run out before you got money to buy more?: No      Within the past 12 months, did the food you bought just not last and you didn t have money to get more?: No   Transportation Needs: Low Risk  (4/29/2024)    Transportation Needs      Within the past 12 months, has lack of transportation kept you from medical appointments, getting your medicines, non-medical meetings or appointments, work, or from getting things that you need?: No   Physical Activity: Insufficiently Active (4/29/2024)    Exercise Vital Sign      Days of Exercise per Week: 2 days      Minutes of Exercise per Session: 20 min   Stress: No Stress Concern Present (4/29/2024)    [employer] - Occupational Stress Questionnaire      Feeling of Stress : Only a little   Social Connections: Unknown (4/29/2024)    Social Connection and Isolation Panel [NHANES]      Frequency of Communication with Friends and Family: Not on file      Frequency of Social Gatherings with Friends and Family: Once a week      Attends Tenriism Services: Not on file      Active Member of Clubs or Organizations: Not on file      Attends Club or Organization Meetings: Not on file      Marital Status: Not on file   Interpersonal Safety: Low Risk  (4/30/2024)    Interpersonal Safety      Do you feel physically and emotionally safe where you currently live?: Yes      Within the past 12 months, have you been hit, slapped, kicked or otherwise physically hurt by someone?: No      Within the past 12 months, have you been humiliated or emotionally abused in other ways by your partner or ex-partner?: No   Housing Stability: Low Risk  (4/29/2024)    Housing Stability      Do you have housing? : Yes      Are you worried  about losing your housing?: No          MEDICATIONS:  has a current medication list which includes the following prescription(s): blood glucose, blood glucose monitoring, dexcom g6 sensor, dexcom g7 sensor, dexcom g6 transmitter, insulin glargine-yfgn, humalog kwikpen, bd maty u/f, jardiance, lisinopril, loratadine, metformin, multiple vitamins-minerals, nystatin, and simvastatin.    ROS     ROS: 10 point ROS neg other than the symptoms noted above in the HPI.    Physical Exam   VS: There were no vitals taken for this visit.  GENERAL: healthy, alert and no distress  EYES: Eyes grossly normal to inspection, conjunctivae and sclerae normal  ENT: no nose swelling, nasal discharge.  Thyroid: no apparent thyroid nodules  RESP: no audible wheeze, cough, or visible cyanosis.  No visible retractions or increased work of breathing.  Able to speak fully in complete sentences.  ABDO: not evaluated.  EXTREMITIES: no hand tremors.  NEURO: Cranial nerves grossly intact, mentation intact and speech normal  SKIN: No apparent skin lesions, rash or edema seen   PSYCH: mentation appears normal, affect normal/bright, judgement and insight intact, normal speech and appearance well-groomed    LABS:  A1c:  Lab Results   Component Value Date    A1C 7.2 04/30/2024    A1C 7.0 12/12/2023    A1C 7.3 09/02/2023    A1C 7.1 04/18/2023    A1C 9.0 03/02/2022    A1C 8.4 04/28/2020     Creatinine:  Creatinine   Date Value Ref Range Status   04/30/2024 0.82 0.67 - 1.17 mg/dL Final   04/28/2020 0.65 (L) 0.66 - 1.25 mg/dL Final     Urine Micro:  Lab Results   Component Value Date    UMALCR  04/30/2024      Comment:      Unable to calculate, urine albumin and/or urine creatinine is outside detectable limits.  Microalbuminuria is defined as an albumin:creatinine ratio of 17 to 299 for males and 25 to 299 for females. A ratio of albumin:creatinine of 300 or higher is indicative of overt proteinuria.  Due to biologic variability, positive results should be  confirmed by a second, first-morning random or 24-hour timed urine specimen. If there is discrepancy, a third specimen is recommended. When 2 out of 3 results are in the microalbuminuria range, this is evidence for incipient nephropathy and warrants increased efforts at glucose control, blood pressure control, and institution of therapy with an angiotensin-converting-enzyme (ACE) inhibitor (if the patient can tolerate it).      UMALCR  03/02/2022      Comment:      Unable to calculate:  Urine creatinine or albumin value below detectable level    UMALCR Unable to calculate due to low value 04/28/2020       LFTs/Lipids:  Recent Labs   Lab Test 04/30/24  0800 04/26/23  0905   CHOL 144 127   HDL 58 49   LDL 69 59   TRIG 87 93     TFTs:  TSH   Date Value Ref Range Status   04/30/2024 3.38 0.30 - 4.20 uIU/mL Final   03/02/2022 2.20 0.40 - 4.00 mU/L Final     All pertinent notes, labs, and images personally reviewed by me.     Glucometer/ insulin pump (if applicable)/ CGM data (if applicable) downloaded, Personally reviewed and interpreted.  All Blood sugar data reviewed personally and discussed with pt.      A/P  Mr.Ryan PHONG Boyle is a 44 year old here for the evaluation/management of diabetes:    1. DM2 - Under poor control.  A1c 7.2%  No know complications.  BG high during day.  Plan:  Discussed diagnosis, pathophysiology, management and treatment options of condition with pt.  I also discussed importance of strict blood sugar control to prevent complications associated with uncontrolled diabetes.  Continue Metformin XR 2000 mg with dinner.  Continue Jardiance 25 mg/day  Semglee-- take 28 units in AM and decrease to 24 units PM  Continue Humalog 2 unit/10 gm of CHO + ssi 1:50> 140.   Decrease carbs with meals  Continue to use Dexcom G7.  Consider medication like Ozempic or Munjaro. Please inform us if you are interested and if covered by insurance.  Labs and follow up in 6-7 months.  Please make a lab appointment for blood  work and follow up clinic appointment in 1 week after that to discuss results.    2. Hypertension - Under Good control.  On lisinopril.    3. Hyperlipidemia - On simvastatin 20 mg/day. LDL 59    4. Prevention:  Opthalmology- recommend annually  ASA-not indicated.  Smoking- No    Most Recent Immunizations   Administered Date(s) Administered     COVID-19 12+ (2023-24) (Pfizer) 11/07/2023     COVID-19 Bivalent 18+ (Moderna) 10/19/2022     COVID-19 MONOVALENT 12+ (Pfizer) 12/28/2021     COVID-19 Vaccine (Bouchra) 12/28/2021     HEPATITIS A (PEDS 12M-18Y) 02/21/2000     Hepatitis B, Adult 09/11/2013     Hepatitis B, Peds 04/12/1999     Influenza (IIV3) PF 09/11/2013     Influenza Vaccine >6 months,quad, PF 12/22/2022     Influenza Vaccine, 6+MO IM (QUADRIVALENT W/PRESERVATIVES) 11/26/2019     Influenza,INJ,MDCK,PF,Quad >6mo(Flucelvax) 11/07/2023     MMR 08/05/1992     Pneumococcal 23 valent 11/17/2008     TDAP (Adacel,Boostrix) 04/26/2023     Td (Adult), Adsorbed 11/10/1995         Recommend checking blood sugars before meals and at bedtime.    If Blood glucose are low more often-> 2-3 times/week- give us a call.  The patient is advised to Make better food choices: reduce carbs, Reduce portion size, weight loss and exercise 3-4 times a week.  Discussed hypoglycemia signs and symptoms as well as management in detail.    There is some variability among people, most will usually develop symptoms suggestive of hypoglycemia when blood glucose levels are lowered to the mid 60's. The first set of symptoms are called adrenergic. Patients may experience any of the following nervousness, sweating, intense hunger, trembling, weakness, palpitations, and difficulty speaking.   The acute management of hypoglycemia involves the rapid delivery of a source of easily absorbed sugar. Regular soda, juice, lifesavers, table sugar, are good options. 15 grams of glucose is the dose that is given, followed by an assessment of symptoms and a  blood glucose check if possible. If after 10 minutes there is no improvement, another 10-15 grams should be given. This can be repeated up to three times. The equivalency of 10-15 grams of glucose (approximate servings) are: Four lifesavers, 4 teaspoons of sugar, or 1/2 can of regular soda or juice.     Discussed indications, risks and benefits of all medications prescribed, and answered questions to patient's satisfaction.  The longitudinal plan of care for the diagnosis(es)/condition(s) as documented were addressed during this visit. Due to the added complexity in care, I will continue to support Clarence in the subsequent management and with ongoing continuity of care.  All questions were answered.  The patient indicates understanding of the above issues and agrees with the plan set forth.     Follow-up:  As noted in AVS    Nayana Salinas M.D  Endocrinology  [facility]  CC: Damien Contreras    Disclaimer: This note consists of symbols derived from keyboarding, dictation and/or voice recognition software. As a result, there may be errors in the script that have gone undetected. Please consider this when interpreting information found in this chart.    Addendum to above note and clinic visit:    Labs reviewed.    See result note/telephone encounter.                    Again, thank you for allowing me to participate in the care of your patient.        Sincerely,        Nayana Salinas MD

## 2024-06-11 NOTE — PATIENT INSTRUCTIONS
[facility]  Dr Salinas, Endocrinology Department    [facility]   [address] [facility]  [place] [address]  Appointment [phone]  Fax: [phone]  [place]: Monday - Thursday      To provide the best diabetic care, please bring your blood glucose meter to each and every visit with your Endocrinologist.  Your blood glucose meter/insulin pump will be downloaded at every appointment.    Please arrive 15 minutes before your scheduled appointment.  This will allow for your blood glucose meter/insulin pump to be downloaded.  If you are wearing DEXCOM please bring  or sharing code so that it can be downloaded.  If you are using freestyle aleshia personal sensors please bring the reader.  If you are using TANDEM insulin pump please have your username and password to get info from [employer]andem website.  Continue Metformin XR 2000 mg with dinner.  Continue Jardiance 25 mg/day  Semglee-- take 28 units in AM and decrease to 24 units PM  Continue Humalog 2 unit/10 gm of CHO + ssi 1:50> 140.   Decrease carbs with meals  Continue to use Dexcom G7.  Consider medication like Ozempic or Munjaro. Please inform us if you are interested and if covered by insurance.  Labs and follow up in 6-7 months.  Please make a lab appointment for blood work and follow up clinic appointment in 1 week after that to discuss results.    Recommend checking blood sugars before meals and at bedtime.    If Blood glucose are low more often-> 2-3 times/week- give us a call.  Make better food choices: reduce carbs, Reduce portion size, weight loss and exercise 3-4 times a week.    What is hypoglycemia:  Hypoglycemia is when blood sugar levels become too low - below 70 m/dl.      What causes hypoglycemia?  - using too much insulin  -taking too many diabetes pills  -not eating enough, or skipping meals or snacks  -not eating enough carbohydrate with meals  -changing your exercise routine  -drinking  alcohol in excess    It is also possible to have hypoglycemia even when you are carefully managing your blood sugar levels.    What does it feel like when blood sugars get too low?  You may feel:  - anxious  -confused  -dizzy  -hungry  -light-headed  -nervous  -shaky  -sleepy  -sweaty    You may have  -blurred or cloudy vision  -heart palpitations (heart skips a beat or races)  -tingling or numbness around the mouth and tongue  -tremors    What to do if you have symptoms of hypoglycmemia:  If you think your blood sugar is too low, check it with a glucose meter.  If its below 70 mg/dl, consume one of the following:  Fruit juice (1/2 cup)  Glucose tablets (15 grams)  Hard candy (5 to 7 pieces)  Honey or sugar (2 teaspoons)  Milk (1/2 cup)  Soft drink (non-diet, 1/2 cup)    Wait 15 minutes and check your blood glucose again.  IF it is still below 70 mg/dl, have another food item listed above. Wait another 15 minutes and repeat the blood glucose test.  Have a small meal or snack that contains some carbohydrate after your blood glucose rises above 70 mg/dl.    If you are at risk of hypoglycemia, always carry with you glucose tablets or one of the foods listed above.      To prevent Hypoglycemia:  Avoid situations that may cause hypoglycemia  Before making any change to your diet or exercise routine, discuss them with your healthcare provider  Keep a record of your blood glucose levels.  Include the time of day, diabetes medications, when you had your last meal or snack, and what you were doing at the time (e.g. Watching TV, gardening, jogging, etc).    Talk to your healthcare provider if your blood glucose levels are often low        Patient guide on hypoglycemia    http://[email or web address].org/Resources/upload/patient-guide-diagnosis-and-management-[email or web address]

## 2024-06-11 NOTE — NURSING NOTE
Is the patient currently in the state of [place]? YES    Visit mode:VIDEO    If the visit is dropped, the patient can be reconnected by: VIDEO VISIT: Text to cell phone:   Telephone Information:   Mobile [phone]       Will anyone else be joining the visit? NO  (If patient encounters technical issues they should call [phone] :[phone])    How would you like to obtain your AVS? MyChart    Are changes needed to the allergy or medication list? No, patient reported no changes to e-check in information for visit. [facility] did not review e-check in information again with patient due to this.     Are refills needed on medications prescribed by this physician? NO    Reason for visit: RECHECK    Janae PICKARD

## 2024-06-12 ENCOUNTER — MYC MEDICAL ADVICE (OUTPATIENT)
Dept: ENDOCRINOLOGY | Facility: CLINIC | Age: 44
End: 2024-06-12
Payer: COMMERCIAL

## 2024-06-12 RX ORDER — INSULIN GLARGINE-YFGN 100 [IU]/ML
28 INJECTION, SOLUTION SUBCUTANEOUS EVERY MORNING
Qty: 45 ML | Refills: 0 | Status: SHIPPED | OUTPATIENT
Start: 2024-06-12 | End: 2024-08-09

## 2024-06-13 ENCOUNTER — TELEPHONE (OUTPATIENT)
Dept: ENDOCRINOLOGY | Facility: CLINIC | Age: 44
End: 2024-06-13
Payer: COMMERCIAL

## 2024-06-13 DIAGNOSIS — E11.65 TYPE 2 DIABETES MELLITUS WITH HYPERGLYCEMIA, WITH LONG-TERM CURRENT USE OF INSULIN (H): ICD-10-CM

## 2024-06-13 DIAGNOSIS — Z79.4 TYPE 2 DIABETES MELLITUS WITH HYPERGLYCEMIA, WITH LONG-TERM CURRENT USE OF INSULIN (H): ICD-10-CM

## 2024-06-13 RX ORDER — EMPAGLIFLOZIN 25 MG/1
25 TABLET, FILM COATED ORAL DAILY
Qty: 90 TABLET | Refills: 0 | Status: SHIPPED | OUTPATIENT
Start: 2024-06-13 | End: 2024-06-14

## 2024-06-13 NOTE — TELEPHONE ENCOUNTER
Prior Authorization Approval    Medication: MOUNJARO 2.5 MG/0.5ML SC SOPN  Authorization Effective Date: 5/14/2024  Authorization Expiration Date: 6/13/2025  Approved Dose/Quantity: uud   Reference #: Key: [id number]   Insurance Company: [employer] Non-Specialty PA's - Phone [phone] Fax [phone]  Expected CoPay: $50.00    CoPay Card Available:      Financial Assistance Needed:    Which Pharmacy is filling the prescription: [id number] [address] [place] [address]  Pharmacy Notified: Yes    Key: [id number]

## 2024-06-13 NOTE — TELEPHONE ENCOUNTER
Requested Prescriptions   Pending Prescriptions Disp Refills    JARDIANCE 25 MG TABS tablet [Pharmacy Med Name: JARDIANCE 25MG TABS] 90 tablet 0     Sig: TAKE ONE TABLET BY MOUTH ONCE DAILY       Sodium Glucose Co-Transport Inhibitor Agents Passed - 6/13/2024  2:50 PM        Passed - Patient has documented A1c within the specified period of time.     If HgbA1C is 8 or greater, it needs to be on file within the past 3 months.  If less than 8, must be on file within the past 6 months.     Recent Labs   Lab Test 04/30/24  0800 04/28/20  0904 09/23/19  0000   A1C 7.2*   < >  --    HEMOGLOBINA1  --   --  7.5*    < > = values in this interval not displayed.             Passed - Medication is active on med list        Passed - Has GFR on file in past 12 months and most recent value is normal        Passed - Recent (6 mo) or future (90 days) visit within the authorizing provider's specialty     The patient must have completed an in-person or virtual visit within the past 6 months or has a future visit scheduled within the next 90 days with the authorizing provider s specialty.  Urgent care and e-visits do not quality as an office visit for this protocol.          Passed - Medication indicated for associated diagnosis     Medication is associated with one or more of the following diagnoses:     Diabetic nephropathy, With Albuminuria - Type 2 diabetes mellitus     Disorder of cardiovascular system; Prophylaxis - Type 2 diabetes mellitus     Type 2 diabetes mellitus    Disorder of cardiovascular system; Prophylaxis - Heart failure   Chronic kidney disease, (At risk of progression) to reduce the risk of sustained   estimated GFR decline, end-stage kidney disease, cardiovascular death,   and hospitalization for heart failure     Heart failure, (NYHA class II to IV, reduced ejection fraction) to reduce risk of  cardiovascular death and hospitalization           Passed - Patient is age 18 or older        Passed - Patient has  documented normal Potassium within the last 12 mos.     Recent Labs   Lab Test 04/30/24  0800   POTASSIUM 4.7

## 2024-06-14 ENCOUNTER — TELEPHONE (OUTPATIENT)
Dept: ENDOCRINOLOGY | Facility: CLINIC | Age: 44
End: 2024-06-14
Payer: COMMERCIAL

## 2024-06-14 DIAGNOSIS — E66.01 MORBID OBESITY (H): ICD-10-CM

## 2024-06-14 DIAGNOSIS — E11.9 TYPE 2 DIABETES MELLITUS WITHOUT COMPLICATION, UNSPECIFIED WHETHER LONG TERM INSULIN USE (H): ICD-10-CM

## 2024-06-21 RX ORDER — INSULIN LISPRO 200 [IU]/ML
INJECTION, SOLUTION SUBCUTANEOUS
Qty: 75 ML | Refills: 0 | Status: SHIPPED | OUTPATIENT
Start: 2024-06-21

## 2024-06-21 NOTE — TELEPHONE ENCOUNTER
Retail Pharmacy Prior Authorization Team   Phone: [phone]    PA Initiation    Medication: HUMALOG KWIKPEN 200 UNIT/ML SC SOPN  Insurance Company: [employer] Non-Specialty PA's - Phone [phone] Fax [phone]  Pharmacy Filling the Rx: [id number] - [place], [place] - [address] [place] [address] [address] AT [facility] [address]  Filling Pharmacy Phone: [phone]  Filling Pharmacy Fax:    Start Date: 6/21/2024      Note: Due to record-high volumes, our turn-around time is taking longer than usual . We are currently 8 days behind in the pools.   We are working diligently to submit all requests in a timely manner and in the order they are received. Please only flag TRUE URGENT requests as high priority to the pool at this time.   If you have questions on status of PA's,  please send a note/message in the active PA encounter and send back to the [facility] PA pool [[phone]].    If you have questions about the turn-around time or about our process, please reach out to our supervisor Christin Cottrell.   Thank you!   RPPA (Retail Pharmacy Prior Authorization) team

## 2024-06-21 NOTE — TELEPHONE ENCOUNTER
Retail Pharmacy Prior Authorization Team   Phone: [phone]    PA has been approved, please send a new RX to the pharmacy. Thanks!!!    Thank you,  Trish Price, Edith  [facility] PA Team

## 2024-06-21 NOTE — TELEPHONE ENCOUNTER
[facility]ail Pharmacy Prior Authorization Team   Phone: [phone]    Prior Authorization Approval    Medication: HUMALOG KWIKPEN 200 UNIT/ML SC SOPN  Authorization Effective Date: 5/22/2024  Authorization Expiration Date: 6/21/2025  Insurance Company: [employer] - Phone [phone] Fax [phone]  Which Pharmacy is filling the prescription: [id number] [address]  AT [place] [address]  Pharmacy Notified: yes  Patient Notified: yes **Instructed pharmacy to notify patient when script is ready to /ship.**

## 2024-06-26 ENCOUNTER — VIRTUAL VISIT (OUTPATIENT)
Dept: EDUCATION SERVICES | Facility: CLINIC | Age: 44
End: 2024-06-26
Attending: INTERNAL MEDICINE
Payer: COMMERCIAL

## 2024-06-26 DIAGNOSIS — E11.65 TYPE 2 DIABETES MELLITUS WITH HYPERGLYCEMIA, WITH LONG-TERM CURRENT USE OF INSULIN (H): Primary | ICD-10-CM

## 2024-06-26 DIAGNOSIS — Z79.4 TYPE 2 DIABETES MELLITUS WITH HYPERGLYCEMIA, WITH LONG-TERM CURRENT USE OF INSULIN (H): Primary | ICD-10-CM

## 2024-06-26 PROCEDURE — 98968 PH1 ASSMT&MGMT NQHP 21-30: CPT | Mod: 95 | Performed by: DIETITIAN, REGISTERED

## 2024-06-26 NOTE — PATIENT INSTRUCTIONS
PLAN  -Reduce Semglee- 30/12, continue same doses of other medications  -After 4 doses of Mounjaro 2.5mg, then increase to 5mg dose.   -If any low blood sugars overnight reduce bedtime Semglee dose by 2 units  -Double check BG with meter if Dexcom reading low.  -Follow-up with Lindy in August, or sooner if questions/concerns via Mychart.

## 2024-06-26 NOTE — LETTER
2024         RE: Clarence Boyle  [id number]  [facility] [address]        Dear Colleague,    Thank you for referring your patient, Clarence Boyle, to the [facility]. Please see a copy of my visit note below.    Diabetes Self-Management Education & Support    Presents for: Individual review    Type of service:  Video Visit    If the video visit is dropped, the video visit invitation should be resent by: Text to cell phone: [phone]    Originating Location (pt. Location): Home  Distant Location (provider location): Offsite  Mode of Communication:  Video Conference via [employer] Start Time:  8:02  Video End Time (time video stopped): 8:27    How would patient like to obtain AVS? MyChart      ASSESSMENT:  Clarence has had DM for 10+ years and follows with endo. Wears Dexcom, TIR 73%. He was just started on Mounjaro, took his first dose last Thursday, tolerating well so far. He has been having some low BG overnight, 3 in the last week (other times was from laying on sensor). He has had issues with low BG overnight before and so changed to AM/PM Semglee. Currently taking 30u/22u. We discussed reducing dose by 20% with all of the dose reduction in evenin/12. If he has more low blood sugars overnight he will continue to reduce Semglee by 2 units each time. Discussed in the future working to get him off as much insulin and possibly completely off of Novolog. We have follow-up in August but he will Mychart me sooner if needed.     Patient's most recent   Lab Results   Component Value Date    A1C 7.2 2024    A1C 8.4 2020    HEMOGLOBINA1 7.5 2019     is not meeting goal of <7.0    Diabetes knowledge and skills assessment:   Patient is knowledgeable in diabetes management concepts related to: Healthy Eating, Monitoring, Taking Medication, and Problem Solving    Continue education with the following diabetes management concepts: Monitoring, Taking Medication,  "and Problem Solving    Based on learning assessment above, most appropriate setting for further diabetes education would be: Individual setting.      PLAN  -Reduce Semglee- 30/12, continue same doses of other medications  -After 4 doses of Mounjaro 2.5mg, then increase to 5mg dose.   -If any low blood sugars overnight reduce bedtime Semglee dose by 2 units  -Double check BG with meter if Dexcom reading low.  -Follow-up with Lindy in August, or sooner [facility]     Topics to cover at upcoming visits: Monitoring, Taking Medication, and Problem Solving    Follow-up: August    See Care Plan for co-developed, patient-state behavior change goals.  AVS provided for patient today.    Education Materials Provided:  No new materials provided today      SUBJECTIVE/OBJECTIVE:  Presents for: Individual review  Accompanied by: Self  Diabetes education in the past 24mo: No  Diabetes type: Type 2  Date of diagnosis: diagnosed in 30s  Disease course: Stable  How confident are you filling out medical forms by yourself:: Extremely  Other concerns:: None  Cultural Influences/Ethnic Background:  Not  or     Diabetes Symptoms & Complications:  Diabetes Related Symptoms: Polyuria (increased urination), Slow healing wounds  Weight trend: Stable  Symptom course: Stable  Disease course: Stable  Complications assessed today?: No    Patient Problem List and Family Medical History reviewed for relevant medical history, current medical status, and diabetes risk factors.    Vitals:  There were no vitals taken for this visit.  Estimated body mass index is 38.22 kg/m  as calculated from the following:    Height as of 4/30/24: 1.772 m (5' 9.75\").    Weight as of 4/30/24: 120 kg (264 lb 8 oz).   Last 3 BP:   BP Readings from Last 3 Encounters:   04/30/24 124/89   04/26/23 124/82   05/12/22 124/86       History   Smoking Status     Never   Smokeless Tobacco     Never       Labs:  Lab Results   Component Value " "Date    A1C 7.2 04/30/2024    A1C 8.4 04/28/2020    HEMOGLOBINA1 7.5 09/23/2019     Lab Results   Component Value Date     04/30/2024     03/02/2022     04/28/2020     Lab Results   Component Value Date    LDL 69 04/30/2024     Direct Measure HDL   Date Value Ref Range Status   04/30/2024 58 >=40 mg/dL Final   ]  GFR Estimate   Date Value Ref Range Status   04/30/2024 >90 >60 mL/min/1.73m2 Final   04/28/2020 >90 >60 mL/min/[1.73_m2] Final     Comment:     Non  GFR Calc  Starting 12/18/2018, serum creatinine based estimated GFR (eGFR) will be   calculated using the Chronic Kidney Disease Epidemiology Collaboration   (CKD-EPI) equation.       GFR Estimate If Black   Date Value Ref Range Status   04/28/2020 >90 >60 mL/min/[1.73_m2] Final     Comment:      GFR Calc  Starting 12/18/2018, serum creatinine based estimated GFR (eGFR) will be   calculated using the Chronic Kidney Disease Epidemiology Collaboration   (CKD-EPI) equation.       Lab Results   Component Value Date    CR 0.82 04/30/2024    CR 0.65 04/28/2020     No results found for: \"MICROALBUMIN\"    Healthy Eating:  Healthy Eating Assessed Today: Yes  Cultural/Congregational diet restrictions?: No  Who cooks/prepares meals for you?: Self, Spouse  Who purchases food in  your home?: Self, Spouse  How many times a week on average do you eat food made away from home (restaurant/take-out)?: 2  Meals include: Breakfast, Lunch, Dinner  Lunch: eating out alot with lunch d/t work-can look up nutritional at [employer].  Dinner: hamburger + chips- took 10 units-went low  Beverages: Water, Diet soda, Energy drinks    Being Active:  Being Active Assessed Today: No  Barrier to exercise: Time    Monitoring:  Monitoring Assessed Today: Yes (sometime having lows other times just on sensor)  Blood Glucose Meter: CGM  Times checking blood sugar at home (number): 5+  Please elaborate:: Use a CGM-Dexcom, sometimes sleeps on it and " goes off  Times checking blood sugar at home (per): Month                    Taking Medications:  Diabetes Medication(s)       Biguanides       metFORMIN (GLUCOPHAGE XR) 500 MG 24 hr tablet TAKE 4 TABLETS(2000 MG) BY MOUTH DAILY WITH DINNER       Insulin       insulin aspart (NOVOLOG PEN) 100 UNIT/ML pen Inject 2 unit/10 gm of CHO + ssi 1:50> 140 up to 80 units daily     Insulin Glargine-yfgn (SEMGLEE, YFGN,) 100 UNIT/ML SOPN Inject 28 Units Subcutaneous every morning And 24 units in the evening     Insulin Lispro (HUMALOG KWIKPEN) 200 UNIT/ML soln INJECT 2 UNITS/10 GRAMS CARBS UNDER SKIN AS DIRECTED WITH CORRECTION 1 UNIT/50 ABOVE 140. TAKE 1/2 CORRECTION AT BEDTIME. ABOUT 80 UNITS DAILY       Sodium-Glucose Co-Transporter 2 (SGLT2) Inhibitors       empagliflozin (JARDIANCE) 25 MG TABS tablet Take 1 tablet (25 mg) by mouth daily       Incretin Mimetic Agents       tirzepatide (MOUNJARO) 5 MG/0.5ML pen Inject 5 mg Subcutaneous every 7 days     tirzepatide (MOUNJARO) 2.5 MG/0.5ML pen Inject 2.5 mg Subcutaneous every 7 days For 4 weeks then follow up with diabetes education            Taking Medication Assessed Today: Yes  Current Treatments: Insulin Injections, Non-insulin Injectables, Oral Medication (taken by mouth), Diet  Dose schedule: Pre-breakfast, Pre-lunch, Pre-dinner, At bedtime  Given by: Patient  Problems taking diabetes medications regularly?: No  Diabetes medication side effects?: No (had issues with Victoza in the past)    Problem Solving:  Problem Solving Assessed Today: Yes  Is the patient at risk for hypoglycemia?: Yes  Hypoglycemia Frequency: Weekly (3x per week-will eat granola bar)  Hypoglycemia Treatment: Other food    Reducing Risks:  Reducing Risks Assessed Today: No  Has dilated eye exam at least once a year?: Yes  Sees dentist every 6 months?: Yes  Feet checked by healthcare provider in the last year?: Yes    Healthy Coping:  Healthy Coping Assessed Today: Yes  Emotional response to diabetes:  Ready to learn  Informal Support system:: Children, Marissa based, Family, Friends, Parent, Spouse  Stage of change: ACTION (Actively working towards change)  Patient Activation Measure Survey Score:       No data to display                  Care Plan and Education Provided:  Monitoring: Blood glucose versus Continuous Glucose Monitoring and fingerstick when dexcom reads low and Taking Medication: GLP-1/GIP discussed how it works and side effects    Lindy Whiteside RD, LD, [facility]      Time Spent: 24 minutes  Encounter Type: Individual    Any diabetes medication dose changes were made via the CDE Protocol per the patient's endocrinology provider. A copy of this encounter was shared with the provider.

## 2024-06-26 NOTE — PROGRESS NOTES
Diabetes Self-Management Education & Support    Presents for: Individual review    Type of service:  Video Visit    If the video visit is dropped, the video visit invitation should be resent by: Text to cell phone: [phone]    Originating Location (pt. Location): Home  Distant Location (provider location): Offsite  Mode of Communication:  Video Conference via [employer]    Video Start Time:  8:02  Video End Time (time video stopped): 8:27    How would patient like to obtain AVS? MyChart      ASSESSMENT:  Clarence has had DM for 10+ years and follows with endo. Wears Dexcom, TIR 73%. He was just started on Mounjaro, took his first dose last Thursday, tolerating well so far. He has been having some low BG overnight, 3 in the last week (other times was from laying on sensor). He has had issues with low BG overnight before and so changed to AM/PM Semglee. Currently taking 30u/22u. We discussed reducing dose by 20% with all of the dose reduction in evenin/12. If he has more low blood sugars overnight he will continue to reduce Semglee by 2 units each time. Discussed in the future working to get him off as much insulin and possibly completely off of Novolog. We have follow-up in August but he will Mychart me sooner if needed.     Patient's most recent   Lab Results   Component Value Date    A1C 7.2 2024    A1C 8.4 2020    HEMOGLOBINA1 7.5 2019     is not meeting goal of <7.0    Diabetes knowledge and skills assessment:   Patient is knowledgeable in diabetes management concepts related to: Healthy Eating, Monitoring, Taking Medication, and Problem Solving    Continue education with the following diabetes management concepts: Monitoring, Taking Medication, and Problem Solving    Based on learning assessment above, most appropriate setting for further diabetes education would be: Individual setting.      PLAN  -Reduce Semglee- , continue same doses of other medications  -After 4 doses of  "Mounjaro 2.5mg, then increase to 5mg dose.   -If any low blood sugars overnight reduce bedtime Semglee dose by 2 units  -Double check BG with meter if Dexcom reading low.  -Follow-up with Lindy in August, or sooner if questions/concerns via Mychart.     Topics to cover at upcoming visits: Monitoring, Taking Medication, and Problem Solving    Follow-up: August    See Care Plan for co-developed, patient-state behavior change goals.  AVS provided for patient today.    Education Materials Provided:  No new materials provided today      SUBJECTIVE/OBJECTIVE:  Presents for: Individual review  Accompanied by: Self  Diabetes education in the past 24mo: No  Diabetes type: Type 2  Date of diagnosis: diagnosed in 30s  Disease course: Stable  How confident are you filling out medical forms by yourself:: Extremely  Other concerns:: None  Cultural Influences/Ethnic Background:  Not  or     Diabetes Symptoms & Complications:  Diabetes Related Symptoms: Polyuria (increased urination), Slow healing wounds  Weight trend: Stable  Symptom course: Stable  Disease course: Stable  Complications assessed today?: No    Patient Problem List and Family Medical History reviewed for relevant medical history, current medical status, and diabetes risk factors.    Vitals:  There were no vitals taken for this visit.  Estimated body mass index is 38.22 kg/m  as calculated from the following:    Height as of 4/30/24: 1.772 m (5' 9.75\").    Weight as of 4/30/24: 120 kg (264 lb 8 oz).   Last 3 BP:   BP Readings from Last 3 Encounters:   04/30/24 124/89   04/26/23 124/82   05/12/22 124/86       History   Smoking Status    Never   Smokeless Tobacco    Never       Labs:  Lab Results   Component Value Date    A1C 7.2 04/30/2024    A1C 8.4 04/28/2020    HEMOGLOBINA1 7.5 09/23/2019     Lab Results   Component Value Date     04/30/2024     03/02/2022     04/28/2020     Lab Results   Component Value Date    LDL 69 " "04/30/2024     Direct Measure HDL   Date Value Ref Range Status   04/30/2024 58 >=40 mg/dL Final   ]  GFR Estimate   Date Value Ref Range Status   04/30/2024 >90 >60 mL/min/1.73m2 Final   04/28/2020 >90 >60 mL/min/[1.73_m2] Final     Comment:     Non  GFR Calc  Starting 12/18/2018, serum creatinine based estimated GFR (eGFR) will be   calculated using the Chronic Kidney Disease Epidemiology Collaboration   (CKD-EPI) equation.       GFR Estimate If Black   Date Value Ref Range Status   04/28/2020 >90 >60 mL/min/[1.73_m2] Final     Comment:      GFR Calc  Starting 12/18/2018, serum creatinine based estimated GFR (eGFR) will be   calculated using the Chronic Kidney Disease Epidemiology Collaboration   (CKD-EPI) equation.       Lab Results   Component Value Date    CR 0.82 04/30/2024    CR 0.65 04/28/2020     No results found for: \"MICROALBUMIN\"    Healthy Eating:  Healthy Eating Assessed Today: Yes  Cultural/Temple diet restrictions?: No  Who cooks/prepares meals for you?: Self, Spouse  Who purchases food in  your home?: Self, Spouse  How many times a week on average do you eat food made away from home (restaurant/take-out)?: 2  Meals include: Breakfast, Lunch, Dinner  Lunch: eating out alot with lunch d/t work-can look up nutritional at [employer].  Dinner: hamburger + chips- took 10 units-went low  Beverages: Water, Diet soda, Energy drinks    Being Active:  Being Active Assessed Today: No  Barrier to exercise: Time    Monitoring:  Monitoring Assessed Today: Yes (sometime having lows other times just on sensor)  Blood Glucose Meter: CGM  Times checking blood sugar at home (number): 5+  Please elaborate:: Use a CGM-Dexcom, sometimes sleeps on it and goes off  Times checking blood sugar at home (per): Month                    Taking Medications:  Diabetes Medication(s)       Biguanides       metFORMIN (GLUCOPHAGE XR) 500 MG 24 hr tablet TAKE 4 TABLETS(2000 MG) BY MOUTH DAILY WITH " DINNER       Insulin       insulin aspart (NOVOLOG PEN) 100 UNIT/ML pen Inject 2 unit/10 gm of CHO + ssi 1:50> 140 up to 80 units daily     Insulin Glargine-yfgn (SEMGLEE, YFGN,) 100 UNIT/ML SOPN Inject 28 Units Subcutaneous every morning And 24 units in the evening     Insulin Lispro (HUMALOG KWIKPEN) 200 UNIT/ML soln INJECT 2 UNITS/10 GRAMS CARBS UNDER SKIN AS DIRECTED WITH CORRECTION 1 UNIT/50 ABOVE 140. TAKE 1/2 CORRECTION AT BEDTIME. ABOUT 80 UNITS DAILY       Sodium-Glucose Co-Transporter 2 (SGLT2) Inhibitors       empagliflozin (JARDIANCE) 25 MG TABS tablet Take 1 tablet (25 mg) by mouth daily       Incretin Mimetic Agents       tirzepatide (MOUNJARO) 5 MG/0.5ML pen Inject 5 mg Subcutaneous every 7 days     tirzepatide (MOUNJARO) 2.5 MG/0.5ML pen Inject 2.5 mg Subcutaneous every 7 days For 4 weeks then follow up with diabetes education            Taking Medication Assessed Today: Yes  Current Treatments: Insulin Injections, Non-insulin Injectables, Oral Medication (taken by mouth), Diet  Dose schedule: Pre-breakfast, Pre-lunch, Pre-dinner, At bedtime  Given by: Patient  Problems taking diabetes medications regularly?: No  Diabetes medication side effects?: No (had issues with Victoza in the past)    Problem Solving:  Problem Solving Assessed Today: Yes  Is the patient at risk for hypoglycemia?: Yes  Hypoglycemia Frequency: Weekly (3x per week-will eat granola bar)  Hypoglycemia Treatment: Other food    Reducing Risks:  Reducing Risks Assessed Today: No  Has dilated eye exam at least once a year?: Yes  Sees dentist every 6 months?: Yes  Feet checked by healthcare provider in the last year?: Yes    Healthy Coping:  Healthy Coping Assessed Today: Yes  Emotional response to diabetes: Ready to learn  Informal Support system:: Children, Marissa based, Family, Friends, Parent, Spouse  Stage of change: ACTION (Actively working towards change)  Patient Activation Measure Survey Score:       No data to display                   Care Plan and Education Provided:  Monitoring: Blood glucose versus Continuous Glucose Monitoring and fingerstick when dexcom reads low and Taking Medication: GLP-1/GIP discussed how it works and side effects    Lindy Whiteside RD, DARIO, [facility]      Time Spent: 24 minutes  Encounter Type: Individual    Any diabetes medication dose changes were made via the CDE Protocol per the patient's endocrinology provider. A copy of this encounter was shared with the provider.

## 2024-07-08 DIAGNOSIS — E11.9 TYPE 2 DIABETES MELLITUS WITHOUT COMPLICATION, UNSPECIFIED WHETHER LONG TERM INSULIN USE (H): ICD-10-CM

## 2024-07-08 DIAGNOSIS — E66.01 MORBID OBESITY (H): ICD-10-CM

## 2024-07-08 DIAGNOSIS — E11.65 TYPE 2 DIABETES MELLITUS WITH HYPERGLYCEMIA, UNSPECIFIED WHETHER LONG TERM INSULIN USE (H): ICD-10-CM

## 2024-07-08 RX ORDER — PROCHLORPERAZINE 25 MG/1
SUPPOSITORY RECTAL
Qty: 9 EACH | Refills: 0 | Status: SHIPPED | OUTPATIENT
Start: 2024-07-08

## 2024-07-08 RX ORDER — METFORMIN HCL 500 MG
2000 TABLET, EXTENDED RELEASE 24 HR ORAL
Qty: 360 TABLET | Refills: 0 | Status: SHIPPED | OUTPATIENT
Start: 2024-07-08

## 2024-07-08 NOTE — TELEPHONE ENCOUNTER
Requested Prescriptions   Pending Prescriptions Disp Refills    metFORMIN (GLUCOPHAGE XR) 500 MG 24 hr tablet [Pharmacy Med Name: METFORMIN ER 500MG 24HR TABS] 360 tablet 0     Sig: TAKE 4 TABLETS(2000 MG) BY MOUTH DAILY WITH DINNER       Biguanide Agents Passed - 7/8/2024  5:15 AM        Passed - Patient is age 10 or older        Passed - Patient has documented A1c within the specified period of time.     If HgbA1C is 8 or greater, it needs to be on file within the past 3 months.  If less than 8, must be on file within the past 6 months.     Recent Labs   Lab Test 04/30/24  0800 04/28/20  0904 09/23/19  0000   A1C 7.2*   < >  --    HEMOGLOBINA1  --   --  7.5*    < > = values in this interval not displayed.             Passed - Patient does NOT have a diagnosis of CHF.        Passed - Medication is active on med list        Passed - Medication indicated for associated diagnosis     Medication is associated with one or more of the following diagnoses:     Gestational diabetes mellitus     Hyperinsulinar obesity     Hypersecretion of ovarian androgens    Non-alcoholic fatty liver    Polycystic ovarian syndrome               Pre-diabetes (DM 2 prevention)    Type 2 diabetes mellitus     Weight gain, antipsychotic therapy-induced    Impaired fasting glucose          Passed - Has GFR on file in past 12 months and most recent value is normal        Passed - Recent (6 mo) or future (90 days) visit within the authorizing provider's specialty     The patient must have completed an in-person or virtual visit within the past 6 months or has a future visit scheduled within the next 90 days with the authorizing provider s specialty.  Urgent care and e-visits do not quality as an office visit for this protocol.

## 2024-07-08 NOTE — TELEPHONE ENCOUNTER
Please disregard refill request for the dexcom g6 sensors. That was an error as our computer auto-sent that request. We need the dexcom g7 sensors.     Thank you!     [facility]/mail pharmacy  [phone]

## 2024-07-08 NOTE — TELEPHONE ENCOUNTER
Requested Prescriptions   Pending Prescriptions Disp Refills    Continuous Glucose Sensor (DEXCOM G6 SENSOR) MISC [Pharmacy Med Name: DEXCOM G6 SENSOR  MISC]  2     Sig: CHANGE EVERY 10 DAYS.       There is no refill protocol information for this order

## 2024-07-10 ENCOUNTER — TELEPHONE (OUTPATIENT)
Dept: INTERNAL MEDICINE | Facility: CLINIC | Age: 44
End: 2024-07-10
Payer: COMMERCIAL

## 2024-07-10 RX ORDER — ACYCLOVIR 400 MG/1
TABLET ORAL
Qty: 9 EACH | Refills: 0 | Status: SHIPPED | OUTPATIENT
Start: 2024-07-10

## 2024-07-10 NOTE — TELEPHONE ENCOUNTER
Pharmacy calls, they are asking if it is ok to fill Dexcom G7 sensor's the request had been sent and signed as a G6, patient has been getting G7 sensors, but has been signed as a G6. Pharmacist is going to try and correct this on their end and if need assistance will reach out again.

## 2024-07-11 ENCOUNTER — MYC MEDICAL ADVICE (OUTPATIENT)
Dept: EDUCATION SERVICES | Facility: CLINIC | Age: 44
End: 2024-07-11
Payer: COMMERCIAL

## 2024-07-21 NOTE — TELEPHONE ENCOUNTER
Pt called back to confirm appt for Monday 4/16, with imaging and Dr. Awad appt.                                                                                        DISCHARGE

## 2024-08-09 ENCOUNTER — VIRTUAL VISIT (OUTPATIENT)
Dept: EDUCATION SERVICES | Facility: CLINIC | Age: 44
End: 2024-08-09
Payer: COMMERCIAL

## 2024-08-09 DIAGNOSIS — E11.65 TYPE 2 DIABETES MELLITUS WITH HYPERGLYCEMIA, WITH LONG-TERM CURRENT USE OF INSULIN (H): Primary | ICD-10-CM

## 2024-08-09 DIAGNOSIS — E11.65 TYPE 2 DIABETES MELLITUS WITH HYPERGLYCEMIA, UNSPECIFIED WHETHER LONG TERM INSULIN USE (H): ICD-10-CM

## 2024-08-09 DIAGNOSIS — Z79.4 TYPE 2 DIABETES MELLITUS WITH HYPERGLYCEMIA, WITH LONG-TERM CURRENT USE OF INSULIN (H): Primary | ICD-10-CM

## 2024-08-09 PROCEDURE — 98968 PH1 ASSMT&MGMT NQHP 21-30: CPT | Mod: 95 | Performed by: DIETITIAN, REGISTERED

## 2024-08-09 RX ORDER — INSULIN GLARGINE-YFGN 100 [IU]/ML
32 INJECTION, SOLUTION SUBCUTANEOUS DAILY
Qty: 5 ML | Refills: 11 | Status: SHIPPED | OUTPATIENT
Start: 2024-08-09

## 2024-08-09 NOTE — LETTER
8/9/2024         RE: Clarence Boyle  [id number]  [facility] [address]        Dear Colleague,    Thank you for referring your patient, Clarence Boyle, to the [facility]. Please see a copy of my visit note below.    Diabetes Self-Management Education & Support    Presents for: Follow-up    Type of service:  Video Visit    If the video visit is dropped, the video visit invitation should be resent by: Text to cell phone: [phone]    Originating Location (pt. Location): Home  Distant Location (provider location): [facility]  Mode of Communication:  Video Conference via [employer]    Video Start Time:  8:05  Video End Time (time video stopped): 8:30    How would patient like to obtain AVS? MyChart      ASSESSMENT:  Clarence is doing well, TIF 82%, was at 73% last visit. His appetite is lower and he has been working to make healthier food choices. 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 He is down 12#. Has some low readings on Dexcom but reports not all are accurate, notices the day before the sensor should be changed numbers aren't as accurate. He has continued to reduce Semglee, current dose is 32u AM and this usually gives ~110mg/dL BG reading. Continues with 2:10 + 1u per 50 >150. We discussed increasing Mounjaro to 7.5mg, after his last dose of 5mg, then if tolerated after 4 weeks he will go up to 10mg dose. Discussed reducing Novolog to 1:10 + correction with dose increase and to let me know if he begins having low BG and we can adjust further. We will follow-up in September    Patient's most recent   Lab Results   Component Value Date    A1C 7.2 04/30/2024    A1C 8.4 04/28/2020    HEMOGLOBINA1 7.5 09/23/2019     is not meeting goal of <7.0    Diabetes knowledge and skills assessment:   Patient is knowledgeable in diabetes management concepts related to: Healthy Eating, Being Active,  Monitoring, Taking Medication, Problem Solving, Reducing Risks, and Healthy Coping    Continue education with the following diabetes management concepts: Healthy Eating, Monitoring, Taking Medication, and Problem Solving    Based on learning assessment above, most appropriate setting for further diabetes education would be: Individual setting.      PLAN  After final dose of Mounjaro 5mg, increase to 7.5mg x 4 doses, then 10mg and we will discuss after this if we should continue to increase or not  Reduce Novolog to 1:10 + 1u per 50>140, when you start the 7.5mg dose. If you are having more lows let Lindy know.  Continue working on diet and activity.  Follow-up in September    Topics to cover at upcoming visits: Healthy Eating, Being Active, Monitoring, Taking Medication, and Problem Solving    Follow-up: Sept    See Care Plan for co-developed, patient-state behavior change goals.  AVS provided for patient today.    Education Materials Provided:  No new materials provided today      SUBJECTIVE/OBJECTIVE:  Presents for: Follow-up  Accompanied by: Self  Diabetes education in the past 24mo: No  Focus of Visit: Taking Medication  Diabetes type: Type 2  Date of diagnosis: diagnosed in 30s  Disease course: Stable  How confident are you filling out medical forms by yourself:: Extremely  Other concerns:: None  Cultural Influences/Ethnic Background:  Not  or     Diabetes Symptoms & Complications:  Diabetes Related Symptoms: Polyuria (increased urination), Slow healing wounds  Weight trend: Decreasing (12# weight loss)  Symptom course: Stable  Disease course: Stable  Complications assessed today?: No    Patient Problem List and Family Medical History reviewed for relevant medical history, current medical status, and diabetes risk factors.    Vitals:  There were no vitals taken for this visit.  Estimated body mass index is 38.22 kg/m  as calculated from the following:    Height as of 4/30/24: 1.772 m (5'  "9.75\").    Weight as of 4/30/24: 120 kg (264 lb 8 oz).   Last 3 BP:   BP Readings from Last 3 Encounters:   04/30/24 124/89   04/26/23 124/82   05/12/22 124/86       History   Smoking Status     Never   Smokeless Tobacco     Never       Labs:  Lab Results   Component Value Date    A1C 7.2 04/30/2024    A1C 8.4 04/28/2020    HEMOGLOBINA1 7.5 09/23/2019     Lab Results   Component Value Date     04/30/2024     03/02/2022     04/28/2020     Lab Results   Component Value Date    LDL 69 04/30/2024     Direct Measure HDL   Date Value Ref Range Status   04/30/2024 58 >=40 mg/dL Final   ]  GFR Estimate   Date Value Ref Range Status   04/30/2024 >90 >60 mL/min/1.73m2 Final   04/28/2020 >90 >60 mL/min/[1.73_m2] Final     Comment:     Non  GFR Calc  Starting 12/18/2018, serum creatinine based estimated GFR (eGFR) will be   calculated using the Chronic Kidney Disease Epidemiology Collaboration   (CKD-EPI) equation.       GFR Estimate If Black   Date Value Ref Range Status   04/28/2020 >90 >60 mL/min/[1.73_m2] Final     Comment:      GFR Calc  Starting 12/18/2018, serum creatinine based estimated GFR (eGFR) will be   calculated using the Chronic Kidney Disease Epidemiology Collaboration   (CKD-EPI) equation.       Lab Results   Component Value Date    CR 0.82 04/30/2024    CR 0.65 04/28/2020     No results found for: \"MICROALBUMIN\"    Healthy Eating:  Healthy Eating Assessed Today: Yes  Cultural/Anabaptist diet restrictions?: No  Who cooks/prepares meals for you?: Self, Spouse  Who purchases food in  your home?: Self, Spouse  How many times a week on average do you eat food made away from home (restaurant/take-out)?: 2  Meals include: Breakfast, Lunch, Dinner  Other: trying to make healthier choices,sometimes forgets about eating, stopped ice cream at night.  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.)  Barrier to exercise: Time    Monitoring:  Monitoring Assessed Today: Yes (sometime having lows other times just on sensor)  Blood Glucose Meter: CGM  Times checking blood sugar at home (number): 5+  Please elaborate:: Use a CGM-Dexcom, sometimes sleeps on it and goes off  Times checking blood sugar at home (per): Month              Taking Medications:  Diabetes Medication(s)       Biguanides       metFORMIN (GLUCOPHAGE XR) 500 MG 24 hr tablet TAKE 4 TABLETS(2000 MG) BY MOUTH DAILY WITH DINNER       Insulin       insulin aspart (NOVOLOG PEN) 100 UNIT/ML pen Inject 1 unit/10 gm of CHO + ssi 1:50> 140 up to 80 units daily     Insulin Glargine-yfgn (SEMGLEE, YFGN,) 100 UNIT/ML SOLN Inject 32 Units subcutaneously daily     Insulin Lispro (HUMALOG KWIKPEN) 200 UNIT/ML soln INJECT 2 UNITS/10 GRAMS CARBS UNDER SKIN AS DIRECTED WITH CORRECTION 1 UNIT/50 ABOVE 140. TAKE 1/2 CORRECTION AT BEDTIME. ABOUT 80 UNITS DAILY     Patient not taking: Reported on 8/9/2024       Sodium-Glucose Co-Transporter 2 (SGLT2) Inhibitors       empagliflozin (JARDIANCE) 25 MG TABS tablet Take 1 tablet (25 mg) by mouth daily       Incretin Mimetic Agents       tirzepatide (MOUNJARO) 7.5 MG/0.5ML pen Inject 7.5 mg subcutaneously every 7 days for 28 days, THEN 10 mg every 7 days for 28 days.     tirzepatide (MOUNJARO) 2.5 MG/0.5ML pen Inject 2.5 mg Subcutaneous every 7 days For 4 weeks then follow up with diabetes education     tirzepatide (MOUNJARO) 5 MG/0.5ML pen Inject 5 mg Subcutaneous every 7 days            Taking Medication Assessed Today: Yes  Current Treatments: Insulin Injections, Non-insulin Injectables, Oral Medication (taken by mouth), Diet (no appetite)  Dose schedule: Pre-breakfast, Pre-lunch, Pre-dinner, At bedtime  Given by: Patient  Problems taking diabetes medications regularly?: No  Diabetes medication side effects?: Yes (does feel a little sick the day after taking the  Mounjaro but then it gets better.)    Problem Solving:  Problem Solving Assessed Today: Yes  Is the patient at risk for hypoglycemia?: Yes  Hypoglycemia Frequency: Weekly (3x per week-will eat granola bar)  Hypoglycemia Treatment: Other food      Reducing Risks:  Reducing Risks Assessed Today: No  Has dilated eye exam at least once a year?: Yes  Sees dentist every 6 months?: Yes  Feet checked by healthcare provider in the last year?: Yes    Healthy Coping:  Healthy Coping Assessed Today: Yes  Emotional response to diabetes: Ready to learn  Informal Support system:: Children, Marissa based, Family, Friends, Parent, Spouse  Stage of change: ACTION (Actively working towards change)  Patient Activation Measure Survey Score:       No data to display                  Care Plan and Education Provided:  Healthy Eating: Balanced meals and protein importance with weight loss, Being Active: Finding a physical activity routine that works for you and Relationship of activity to glucose, Taking Medication: Action of prescribed medication(s) and dose adjustments, Problem Solving: When to call a health care provider, and Reducing Risks:     Lindy Whiteside RD, DARIO, [facility]      Time Spent: 25 minutes  Encounter Type: Individual    Any diabetes medication dose changes were made via the CDE Protocol per the patient's endocrinology provider. A copy of this encounter was shared with the provider.

## 2024-08-09 NOTE — PROGRESS NOTES
Diabetes Self-Management Education & Support    Presents for: Follow-up    Type of service:  Video Visit    If the video visit is dropped, the video visit invitation should be resent by: Text to cell phone: [phone]    Originating Location (pt. Location): Home  Distant Location (provider location): [facility]  Mode of Communication:  Video Conference via [employer]    Video Start Time:  8:05  Video End Time (time video stopped): 8:30    How would patient like to obtain AVS? MyChart      ASSESSMENT:  Clarence is doing well, TIF 82%, was at 73% last visit. His appetite is lower and he has been working to make healthier food choices. 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 He is down 12#. Has some low readings on Dexcom but reports not all are accurate, notices the day before the sensor should be changed numbers aren't as accurate. He has continued to reduce Semglee, current dose is 32u AM and this usually gives ~110mg/dL BG reading. Continues with 2:10 + 1u per 50 >150. We discussed increasing Mounjaro to 7.5mg, after his last dose of 5mg, then if tolerated after 4 weeks he will go up to 10mg dose. Discussed reducing Novolog to 1:10 + correction with dose increase and to let me know if he begins having low BG and we can adjust further. We will follow-up in September    Patient's most recent   Lab Results   Component Value Date    A1C 7.2 04/30/2024    A1C 8.4 04/28/2020    HEMOGLOBINA1 7.5 09/23/2019     is not meeting goal of <7.0    Diabetes knowledge and skills assessment:   Patient is knowledgeable in diabetes management concepts related to: Healthy Eating, Being Active, Monitoring, Taking Medication, Problem Solving, Reducing Risks, and Healthy Coping    Continue education with the following diabetes management concepts: Healthy Eating, Monitoring, Taking Medication, and Problem Solving    Based on learning  "assessment above, most appropriate setting for further diabetes education would be: Individual setting.      PLAN  After final dose of Mounjaro 5mg, increase to 7.5mg x 4 doses, then 10mg and we will discuss after this if we should continue to increase or not  Reduce Novolog to 1:10 + 1u per 50>140, when you start the 7.5mg dose. If you are having more lows let Lindy know.  Continue working on diet and activity.  Follow-up in September    Topics to cover at upcoming visits: Healthy Eating, Being Active, Monitoring, Taking Medication, and Problem Solving    Follow-up: Sept    See Care Plan for co-developed, patient-state behavior change goals.  AVS provided for patient today.    Education Materials Provided:  No new materials provided today      SUBJECTIVE/OBJECTIVE:  Presents for: Follow-up  Accompanied by: Self  Diabetes education in the past 24mo: No  Focus of Visit: Taking Medication  Diabetes type: Type 2  Date of diagnosis: diagnosed in 30s  Disease course: Stable  How confident are you filling out medical forms by yourself:: Extremely  Other concerns:: None  Cultural Influences/Ethnic Background:  Not  or     Diabetes Symptoms & Complications:  Diabetes Related Symptoms: Polyuria (increased urination), Slow healing wounds  Weight trend: Decreasing (12# weight loss)  Symptom course: Stable  Disease course: Stable  Complications assessed today?: No    Patient Problem List and Family Medical History reviewed for relevant medical history, current medical status, and diabetes risk factors.    Vitals:  There were no vitals taken for this visit.  Estimated body mass index is 38.22 kg/m  as calculated from the following:    Height as of 4/30/24: 1.772 m (5' 9.75\").    Weight as of 4/30/24: 120 kg (264 lb 8 oz).   Last 3 BP:   BP Readings from Last 3 Encounters:   04/30/24 124/89   04/26/23 124/82   05/12/22 124/86       History   Smoking Status    Never   Smokeless Tobacco    Never       Labs:  Lab " "Results   Component Value Date    A1C 7.2 04/30/2024    A1C 8.4 04/28/2020    HEMOGLOBINA1 7.5 09/23/2019     Lab Results   Component Value Date     04/30/2024     03/02/2022     04/28/2020     Lab Results   Component Value Date    LDL 69 04/30/2024     Direct Measure HDL   Date Value Ref Range Status   04/30/2024 58 >=40 mg/dL Final   ]  GFR Estimate   Date Value Ref Range Status   04/30/2024 >90 >60 mL/min/1.73m2 Final   04/28/2020 >90 >60 mL/min/[1.73_m2] Final     Comment:     Non  GFR Calc  Starting 12/18/2018, serum creatinine based estimated GFR (eGFR) will be   calculated using the Chronic Kidney Disease Epidemiology Collaboration   (CKD-EPI) equation.       GFR Estimate If Black   Date Value Ref Range Status   04/28/2020 >90 >60 mL/min/[1.73_m2] Final     Comment:      GFR Calc  Starting 12/18/2018, serum creatinine based estimated GFR (eGFR) will be   calculated using the Chronic Kidney Disease Epidemiology Collaboration   (CKD-EPI) equation.       Lab Results   Component Value Date    CR 0.82 04/30/2024    CR 0.65 04/28/2020     No results found for: \"MICROALBUMIN\"    Healthy Eating:  Healthy Eating Assessed Today: Yes  Cultural/Denominational diet restrictions?: No  Who cooks/prepares meals for you?: Self, Spouse  Who purchases food in  your home?: Self, Spouse  How many times a week on average do you eat food made away from home (restaurant/take-out)?: 2  Meals include: Breakfast, Lunch, Dinner  Other: trying to make healthier choices,sometimes forgets about eating, stopped ice cream at night.  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.)  Barrier to exercise: Time    Monitoring:  Monitoring Assessed Today: Yes (sometime having lows other times just on sensor)  Blood Glucose Meter: CGM  Times checking blood sugar at home " (number): 5+  Please elaborate:: Use a CGM-Dexcom, sometimes sleeps on it and goes off  Times checking blood sugar at home (per): Month              Taking Medications:  Diabetes Medication(s)       Biguanides       metFORMIN (GLUCOPHAGE XR) 500 MG 24 hr tablet TAKE 4 TABLETS(2000 MG) BY MOUTH DAILY WITH DINNER       Insulin       insulin aspart (NOVOLOG PEN) 100 UNIT/ML pen Inject 1 unit/10 gm of CHO + ssi 1:50> 140 up to 80 units daily     Insulin Glargine-yfgn (SEMGLEE, YFGN,) 100 UNIT/ML SOLN Inject 32 Units subcutaneously daily     Insulin Lispro (HUMALOG KWIKPEN) 200 UNIT/ML soln INJECT 2 UNITS/10 GRAMS CARBS UNDER SKIN AS DIRECTED WITH CORRECTION 1 UNIT/50 ABOVE 140. TAKE 1/2 CORRECTION AT BEDTIME. ABOUT 80 UNITS DAILY     Patient not taking: Reported on 8/9/2024       Sodium-Glucose Co-Transporter 2 (SGLT2) Inhibitors       empagliflozin (JARDIANCE) 25 MG TABS tablet Take 1 tablet (25 mg) by mouth daily       Incretin Mimetic Agents       tirzepatide (MOUNJARO) 7.5 MG/0.5ML pen Inject 7.5 mg subcutaneously every 7 days for 28 days, THEN 10 mg every 7 days for 28 days.     tirzepatide (MOUNJARO) 2.5 MG/0.5ML pen Inject 2.5 mg Subcutaneous every 7 days For 4 weeks then follow up with diabetes education     tirzepatide (MOUNJARO) 5 MG/0.5ML pen Inject 5 mg Subcutaneous every 7 days            Taking Medication Assessed Today: Yes  Current Treatments: Insulin Injections, Non-insulin Injectables, Oral Medication (taken by mouth), Diet (no appetite)  Dose schedule: Pre-breakfast, Pre-lunch, Pre-dinner, At bedtime  Given by: Patient  Problems taking diabetes medications regularly?: No  Diabetes medication side effects?: Yes (does feel a little sick the day after taking the Mounjaro but then it gets better.)    Problem Solving:  Problem Solving Assessed Today: Yes  Is the patient at risk for hypoglycemia?: Yes  Hypoglycemia Frequency: Weekly (3x per week-will eat granola bar)  Hypoglycemia Treatment: Other  food      Reducing Risks:  Reducing Risks Assessed Today: No  Has dilated eye exam at least once a year?: Yes  Sees dentist every 6 months?: Yes  Feet checked by healthcare provider in the last year?: Yes    Healthy Coping:  Healthy Coping Assessed Today: Yes  Emotional response to diabetes: Ready to learn  Informal Support system:: Children, Marissa based, Family, Friends, Parent, Spouse  Stage of change: ACTION (Actively working towards change)  Patient Activation Measure Survey Score:       No data to display                  Care Plan and Education Provided:  Healthy Eating: Balanced meals and protein importance with weight loss, Being Active: Finding a physical activity routine that works for you and Relationship of activity to glucose, Taking Medication: Action of prescribed medication(s) and dose adjustments, Problem Solving: When to call a health care provider, and Reducing Risks:     Lindy Whiteside RD, DARIO, [facility]      Time Spent: 25 minutes  Encounter Type: Individual    Any diabetes medication dose changes were made via the CDE Protocol per the patient's endocrinology provider. A copy of this encounter was shared with the provider.

## 2024-08-09 NOTE — PATIENT INSTRUCTIONS
PLAN  After final dose of Mounjaro 5mg, increase to 7.5mg x 4 doses, then 10mg and we will discuss after this if we should continue to increase or not  Reduce Novolog to 1:10 + 1u per 50>140, when you start the 7.5mg dose. If you are having more lows let Lindy know.  Continue working on diet and activity.  Follow-up in September

## 2024-09-28 DIAGNOSIS — Z79.4 TYPE 2 DIABETES MELLITUS WITH HYPERGLYCEMIA, WITH LONG-TERM CURRENT USE OF INSULIN (H): ICD-10-CM

## 2024-09-28 DIAGNOSIS — E11.65 TYPE 2 DIABETES MELLITUS WITH HYPERGLYCEMIA, WITH LONG-TERM CURRENT USE OF INSULIN (H): ICD-10-CM

## 2024-09-30 ENCOUNTER — MYC MEDICAL ADVICE (OUTPATIENT)
Dept: EDUCATION SERVICES | Facility: CLINIC | Age: 44
End: 2024-09-30
Payer: COMMERCIAL

## 2024-09-30 DIAGNOSIS — E11.65 TYPE 2 DIABETES MELLITUS WITH HYPERGLYCEMIA, WITH LONG-TERM CURRENT USE OF INSULIN (H): Primary | ICD-10-CM

## 2024-09-30 DIAGNOSIS — Z79.4 TYPE 2 DIABETES MELLITUS WITH HYPERGLYCEMIA, WITH LONG-TERM CURRENT USE OF INSULIN (H): Primary | ICD-10-CM

## 2024-09-30 RX ORDER — EMPAGLIFLOZIN 25 MG/1
25 TABLET, FILM COATED ORAL DAILY
Qty: 90 TABLET | Refills: 0 | Status: SHIPPED | OUTPATIENT
Start: 2024-09-30

## 2024-09-30 NOTE — TELEPHONE ENCOUNTER
Requested Prescriptions   Pending Prescriptions Disp Refills    JARDIANCE 25 MG TABS tablet [Pharmacy Med Name: JARDIANCE 25MG TABS] 90 tablet 0     Sig: TAKE ONE TABLET BY MOUTH ONCE DAILY       Sodium Glucose Co-Transport Inhibitor Agents Passed - 9/28/2024  1:27 PM        Passed - Patient has documented A1c within the specified period of time.     If HgbA1C is 8 or greater, it needs to be on file within the past 3 months.  If less than 8, must be on file within the past 6 months.     Recent Labs   Lab Test 04/30/24  0800 04/28/20  0904 09/23/19  0000   A1C 7.2*   < >  --    HEMOGLOBINA1  --   --  7.5*    < > = values in this interval not displayed.             Passed - Medication is active on med list        Passed - Recent (6 mo) or future (90 days) visit within the authorizing provider's specialty     The patient must have completed an in-person or virtual visit within the past 6 months or has a future visit scheduled within the next 90 days with the authorizing provider s specialty.  Urgent care and e-visits do not quality as an office visit for this protocol.          Passed - Medication indicated for associated diagnosis     Medication is associated with one or more of the following diagnoses:     Diabetic nephropathy, With Albuminuria - Type 2 diabetes mellitus     Disorder of cardiovascular system; Prophylaxis - Type 2 diabetes mellitus     Type 2 diabetes mellitus    Disorder of cardiovascular system; Prophylaxis - Heart failure   Chronic kidney disease, (At risk of progression) to reduce the risk of sustained   estimated GFR decline, end-stage kidney disease, cardiovascular death,   and hospitalization for heart failure     Heart failure, (NYHA class II to IV, reduced ejection fraction) to reduce risk of  cardiovascular death and hospitalization           Passed - Has GFR on file in past 12 months and most recent value is >30        Passed - Patient is age 18 or older        Passed - Patient has  documented normal Potassium within the last 12 mos.     Recent Labs   Lab Test 04/30/24  0800   POTASSIUM 4.7

## 2024-10-09 DIAGNOSIS — E66.01 MORBID OBESITY (H): ICD-10-CM

## 2024-10-09 DIAGNOSIS — E11.9 TYPE 2 DIABETES MELLITUS WITHOUT COMPLICATION, UNSPECIFIED WHETHER LONG TERM INSULIN USE (H): ICD-10-CM

## 2024-10-09 RX ORDER — SIMVASTATIN 20 MG
20 TABLET ORAL DAILY
Qty: 90 TABLET | Refills: 1 | OUTPATIENT
Start: 2024-10-09

## 2024-10-21 ENCOUNTER — MYC REFILL (OUTPATIENT)
Dept: ENDOCRINOLOGY | Facility: CLINIC | Age: 44
End: 2024-10-21
Payer: COMMERCIAL

## 2024-10-21 DIAGNOSIS — E66.01 MORBID OBESITY (H): ICD-10-CM

## 2024-10-21 DIAGNOSIS — E11.9 TYPE 2 DIABETES MELLITUS WITHOUT COMPLICATION, UNSPECIFIED WHETHER LONG TERM INSULIN USE (H): ICD-10-CM

## 2024-10-21 RX ORDER — METFORMIN HYDROCHLORIDE 500 MG/1
2000 TABLET, EXTENDED RELEASE ORAL
Qty: 360 TABLET | Refills: 0 | Status: SHIPPED | OUTPATIENT
Start: 2024-10-21

## 2024-10-21 NOTE — TELEPHONE ENCOUNTER
Requested Prescriptions   Pending Prescriptions Disp Refills    metFORMIN (GLUCOPHAGE XR) 500 MG 24 hr tablet 360 tablet 0     Sig: Take 4 tablets (2,000 mg) by mouth daily (with dinner).       Biguanide Agents Passed - 10/21/2024  3:52 PM        Passed - Patient is age 10 or older        Passed - Patient has documented A1c within the specified period of time.     If HgbA1C is 8 or greater, it needs to be on file within the past 3 months.  If less than 8, must be on file within the past 6 months.     Recent Labs   Lab Test 04/30/24  0800 04/28/20  0904 09/23/19  0000   A1C 7.2*   < >  --    HEMOGLOBINA1  --   --  7.5*    < > = values in this interval not displayed.             Passed - Patient does NOT have a diagnosis of CHF.        Passed - Medication is active on med list        Passed - Medication indicated for associated diagnosis     Medication is associated with one or more of the following diagnoses:     Gestational diabetes mellitus     Hyperinsulinar obesity     Hypersecretion of ovarian androgens    Non-alcoholic fatty liver    Polycystic ovarian syndrome               Pre-diabetes (DM 2 prevention)    Type 2 diabetes mellitus     Weight gain, antipsychotic therapy-induced    Impaired fasting glucose          Passed - Has GFR on file in past 12 months and most recent value is normal        Passed - Recent (6 mo) or future (90 days) visit within the authorizing provider's specialty     The patient must have completed an in-person or virtual visit within the past 6 months or has a future visit scheduled within the next 90 days with the authorizing provider s specialty.  Urgent care and e-visits do not quality as an office visit for this protocol.

## 2024-10-28 ENCOUNTER — MYC MEDICAL ADVICE (OUTPATIENT)
Dept: EDUCATION SERVICES | Facility: CLINIC | Age: 44
End: 2024-10-28
Payer: COMMERCIAL

## 2024-10-28 DIAGNOSIS — E11.65 TYPE 2 DIABETES MELLITUS WITH HYPERGLYCEMIA, WITH LONG-TERM CURRENT USE OF INSULIN (H): Primary | ICD-10-CM

## 2024-10-28 DIAGNOSIS — Z79.4 TYPE 2 DIABETES MELLITUS WITH HYPERGLYCEMIA, WITH LONG-TERM CURRENT USE OF INSULIN (H): Primary | ICD-10-CM

## 2024-10-29 NOTE — TELEPHONE ENCOUNTER
Diabetes Education Follow-up    Subjective/Objective:    Clarence Boyle sent in blood glucose log for review. Last date of communication was: 9/30.    Diabetes is being managed with Lifestyle (diet/activity), Diabetes Medications   Diabetes Medication(s)       Biguanides       metFORMIN (GLUCOPHAGE XR) 500 MG 24 hr tablet Take 4 tablets (2,000 mg) by mouth daily (with dinner).       Insulin       insulin aspart (NOVOLOG PEN) 100 UNIT/ML pen Inject 1 unit/10 gm of CHO + ssi 1:50> 140 up to 80 units daily     Insulin Glargine-yfgn (SEMGLEE, YFGN,) 100 UNIT/ML SOLN Inject 32 Units subcutaneously daily     Insulin Lispro (HUMALOG KWIKPEN) 200 UNIT/ML soln INJECT 2 UNITS/10 GRAMS CARBS UNDER SKIN AS DIRECTED WITH CORRECTION 1 UNIT/50 ABOVE 140. TAKE 1/2 CORRECTION AT BEDTIME. ABOUT 80 UNITS DAILY     Patient not taking: Reported on 8/9/2024       Sodium-Glucose Co-Transporter 2 (SGLT2) Inhibitors       JARDIANCE 25 MG TABS tablet TAKE ONE TABLET BY MOUTH ONCE DAILY       Incretin Mimetic Agents       tirzepatide (MOUNJARO) 10 MG/0.5ML pen Inject 10 mg subcutaneously every 7 days.            BG/Food Log:                   Assessment: Clarence is tolerating the 10mg Mounjaro dose and wants to increase dose. Given some low BG readings, will reduce Semglee back to 32 units (pt reported he was taking 40 units). He will make follow-up appointment      Plan/Response:  See Patient Instructions for co-developed, patient-stated behavior change goals.  Recommend decrease to insulin - Semglee 32 units-given BG on lower side some overnight  Increase Mounjaro to 12.5mg    Lindy Whiteside RD, LD, CDCES'      Any diabetes medication dose changes were made via the CDE Protocol and Collaborative Practice Agreement with the patient's primary care provider. A copy of this encounter was shared with the provider.

## 2024-11-19 ENCOUNTER — ALLIED HEALTH/NURSE VISIT (OUTPATIENT)
Dept: FAMILY MEDICINE | Facility: CLINIC | Age: 44
End: 2024-11-19
Payer: COMMERCIAL

## 2024-11-19 VITALS — TEMPERATURE: 97.7 F

## 2024-11-19 DIAGNOSIS — Z23 HIGH PRIORITY FOR 2019-NCOV VACCINE: ICD-10-CM

## 2024-11-19 DIAGNOSIS — Z23 NEED FOR PROPHYLACTIC VACCINATION AND INOCULATION AGAINST INFLUENZA: Primary | ICD-10-CM

## 2024-11-19 PROCEDURE — 91320 SARSCV2 VAC 30MCG TRS-SUC IM: CPT | Performed by: PEDIATRICS

## 2024-11-19 PROCEDURE — 99207 PR NO CHARGE NURSE ONLY: CPT | Performed by: PEDIATRICS

## 2024-11-19 PROCEDURE — 90480 ADMN SARSCOV2 VAC 1/ONLY CMP: CPT | Performed by: PEDIATRICS

## 2024-11-19 PROCEDURE — 90656 IIV3 VACC NO PRSV 0.5 ML IM: CPT | Performed by: PEDIATRICS

## 2024-11-19 PROCEDURE — 90471 IMMUNIZATION ADMIN: CPT | Performed by: PEDIATRICS

## 2024-11-30 ENCOUNTER — HEALTH MAINTENANCE LETTER (OUTPATIENT)
Age: 44
End: 2024-11-30

## 2024-12-04 ENCOUNTER — MEDICAL CORRESPONDENCE (OUTPATIENT)
Dept: HEALTH INFORMATION MANAGEMENT | Facility: CLINIC | Age: 44
End: 2024-12-04

## 2024-12-04 ENCOUNTER — TELEPHONE (OUTPATIENT)
Dept: ENDOCRINOLOGY | Facility: CLINIC | Age: 44
End: 2024-12-04
Payer: COMMERCIAL

## 2024-12-04 NOTE — TELEPHONE ENCOUNTER
E[employer] order form for therapeutic implantable CGM    LAST OFFICE/VIRTUAL VISIT:  06/11/24    FUTURE OFFICE/VIRTUAL VISIT:  01/06/25    Lab Results   Component Value Date    A1C 7.2 04/30/2024    A1C 7.0 12/12/2023    A1C 7.3 09/02/2023    A1C 7.1 04/18/2023    A1C 9.0 03/02/2022    A1C 8.4 04/28/2020           Rosie Meier [facility] Endocrin[facility] [place]  [phone]

## 2024-12-05 ENCOUNTER — MYC MEDICAL ADVICE (OUTPATIENT)
Dept: ENDOCRINOLOGY | Facility: CLINIC | Age: 44
End: 2024-12-05
Payer: COMMERCIAL

## 2024-12-05 NOTE — TELEPHONE ENCOUNTER
See my chart message.    Rosie Meier [facility] Endocrinology [place]  [phone]

## 2024-12-09 NOTE — TELEPHONE ENCOUNTER
Yes, this is something that I am interested in exploring to see if it is covered by insurance and if it makes sense thank you

## 2024-12-12 NOTE — TELEPHONE ENCOUNTER
Form was faxed and sent to scanning.      Rosie Meier, [facility] Endocrinology  Tobi/Jf

## 2024-12-17 DIAGNOSIS — Z79.4 TYPE 2 DIABETES MELLITUS WITH HYPERGLYCEMIA, WITH LONG-TERM CURRENT USE OF INSULIN (H): ICD-10-CM

## 2024-12-17 DIAGNOSIS — E11.65 TYPE 2 DIABETES MELLITUS WITH HYPERGLYCEMIA, WITH LONG-TERM CURRENT USE OF INSULIN (H): ICD-10-CM

## 2024-12-18 RX ORDER — EMPAGLIFLOZIN 25 MG/1
25 TABLET, FILM COATED ORAL DAILY
Qty: 30 TABLET | Refills: 0 | Status: SHIPPED | OUTPATIENT
Start: 2024-12-18

## 2024-12-18 NOTE — TELEPHONE ENCOUNTER
Requested Prescriptions   Pending Prescriptions Disp Refills    JARDIANCE 25 MG TABS tablet [Pharmacy Med Name: JARDIANCE 25MG TABS] 90 tablet 0     Sig: TAKE ONE TABLET BY MOUTH ONCE DAILY       Sodium Glucose Co-Transport Inhibitor Agents Failed - 12/18/2024 11:10 AM        Failed - Recent (6 mo) or future (90 days) visit within the authorizing provider's specialty     The patient must have completed an in-person or virtual visit within the past 6 months or has a future visit scheduled within the next 90 days with the authorizing provider s specialty.  Urgent care and e-visits do not quality as an office visit for this protocol.          Passed - Patient has documented A1c within the specified period of time.     If HgbA1C is 8 or greater, it needs to be on file within the past 3 months.  If less than 8, must be on file within the past 6 months.     Recent Labs   Lab Test 12/06/24  1133 04/28/20  0904 09/23/19  0000   A1C 6.9*   < >  --    HEMOGLOBINA1  --   --  7.5*    < > = values in this interval not displayed.             Passed - Medication is active on med list        Passed - Medication indicated for associated diagnosis     Medication is associated with one or more of the following diagnoses:     Diabetic nephropathy, With Albuminuria - Type 2 diabetes mellitus     Disorder of cardiovascular system; Prophylaxis - Type 2 diabetes mellitus     Type 2 diabetes mellitus    Disorder of cardiovascular system; Prophylaxis - Heart failure   Chronic kidney disease, (At risk of progression) to reduce the risk of sustained   estimated GFR decline, end-stage kidney disease, cardiovascular death,   and hospitalization for heart failure     Heart failure, (NYHA class II to IV, reduced ejection fraction) to reduce risk of  cardiovascular death and hospitalization           Passed - Has GFR on file in past 12 months and most recent value is >30        Passed - Patient is age 18 or older        Passed - Patient has  documented normal Potassium within the last 12 mos.     Recent Labs   Lab Test 04/30/24  0800   POTASSIUM 4.7

## 2024-12-23 NOTE — PROGRESS NOTES
Outcome for 12/23/24 9:57 AM: Data uploaded on Dexcom  Amy Hendricks MA  Outcome for 01/02/25 2:26 PM: Dexcom emailed to provider  Amy Hendricks MA

## 2025-01-06 ENCOUNTER — VIRTUAL VISIT (OUTPATIENT)
Dept: ENDOCRINOLOGY | Facility: CLINIC | Age: 45
End: 2025-01-06
Payer: COMMERCIAL

## 2025-01-06 DIAGNOSIS — I10 PRIMARY HYPERTENSION: ICD-10-CM

## 2025-01-06 DIAGNOSIS — E66.01 MORBID OBESITY (H): ICD-10-CM

## 2025-01-06 DIAGNOSIS — E11.65 TYPE 2 DIABETES MELLITUS WITH HYPERGLYCEMIA, UNSPECIFIED WHETHER LONG TERM INSULIN USE (H): ICD-10-CM

## 2025-01-06 DIAGNOSIS — E78.5 HYPERLIPIDEMIA, UNSPECIFIED HYPERLIPIDEMIA TYPE: ICD-10-CM

## 2025-01-06 DIAGNOSIS — Z79.4 LONG-TERM INSULIN USE (H): ICD-10-CM

## 2025-01-06 DIAGNOSIS — E11.65 TYPE 2 DIABETES MELLITUS WITH HYPERGLYCEMIA, WITH LONG-TERM CURRENT USE OF INSULIN (H): Primary | ICD-10-CM

## 2025-01-06 DIAGNOSIS — Z79.4 TYPE 2 DIABETES MELLITUS WITH HYPERGLYCEMIA, WITH LONG-TERM CURRENT USE OF INSULIN (H): Primary | ICD-10-CM

## 2025-01-06 DIAGNOSIS — E11.9 TYPE 2 DIABETES MELLITUS WITHOUT COMPLICATION, UNSPECIFIED WHETHER LONG TERM INSULIN USE (H): ICD-10-CM

## 2025-01-06 PROCEDURE — 98006 SYNCH AUDIO-VIDEO EST MOD 30: CPT | Performed by: INTERNAL MEDICINE

## 2025-01-06 PROCEDURE — 95251 CONT GLUC MNTR ANALYSIS I&R: CPT | Performed by: INTERNAL MEDICINE

## 2025-01-06 RX ORDER — METFORMIN HYDROCHLORIDE 500 MG/1
2000 TABLET, EXTENDED RELEASE ORAL
Qty: 360 TABLET | Refills: 2 | Status: SHIPPED | OUTPATIENT
Start: 2025-01-06

## 2025-01-06 RX ORDER — INSULIN GLARGINE-YFGN 100 [IU]/ML
35 INJECTION, SOLUTION SUBCUTANEOUS DAILY
Qty: 15 ML | Refills: 3 | Status: SHIPPED | OUTPATIENT
Start: 2025-01-06

## 2025-01-06 RX ORDER — ACYCLOVIR 400 MG/1
TABLET ORAL
Qty: 9 EACH | Refills: 0 | Status: SHIPPED | OUTPATIENT
Start: 2025-01-06

## 2025-01-06 NOTE — LETTER
"1/6/2025      Clarence Boyle  [address]  [place] [address]      Dear Colleague,    Thank you for referring your patient, Clarence Boyle, to the [facility]. Please see a copy of my visit note below.    Outcome for 12/23/24 9:57 AM: Data uploaded on Dexcom  Amy Hendricks MA  Outcome for 01/02/25 2:26 PM: Dexcom emailed to provider  Amy Hendricks MA      THIS IS A VIDEO VISIT:    Phone call visit/virtual visit encounter:    Name of patient: Clarence Boyle    Date of encounter: 1/6/2025    Time of start of video visit: 11:04    Video started: 11:10    Video ended: 11:26    Provider location: working from home/ [facility]    Patient location: patients home.    Mode of transmission: [employer] video/ [employer]    Verbal consent: obtained before starting visit. Pt is agreeable.      The patient has been notified of following:      \"This VIDEO visit will be conducted via a call between you and your physician/provider. We have found that certain health care needs can be provided without the need for a physical exam.  This service lets us provide the care you need with a short phone conversation.  If a prescription is necessary we can send it directly to your pharmacy.  If lab work is needed we can place an order for that and you can then stop by our lab to have the test done at a later time.     With new updates with corona virus patient might be billed as clinic visit.     If during the course of the call the physician/provider feels a telephone visit is not appropriate, you will not be charged for this service.\"      Past medical history, social history, family history, allergy and medications were reviewed and updated as appropriate.  Reviewed pertinent labs, notes, imaging studies personally.    ENDOCRINOLOGY CLINIC NOTE:  Name: Clarence Boyle  Seen for follow up of type 2 DM.  HPI:  Clarence Boyle is a 44 year old male who presents for the evaluation/management of Diabetes.   has a past medical " history of Diabetes (H), Hypertension, and Obese.    Was seen by  before at [facility].  Was followed by CDE closely as well.  Available records, labs and images from outside clinic were personally reviewed.    Reports on and off low BG.  Appetite is OK.  Weight: mostly stable.    Has never been on insulin pump. He might be interested in future which is compatible with Dexcom G7. But currently he is OK with MDI.  He is not sure if insulin pump is covered by insurance.  He had GI s/e on Victoza.  Using DEXCOM G7.  Recently started Mounjaro 15 mg/week. Tolerating ok.  Lost about 27 lbs so far.  Working closely with CDE.    Considering Eversense.    Wt Readings from Last 2 Encounters:   04/30/24 120 kg (264 lb 8 oz)   04/26/23 119.7 kg (264 lb)       1. Type 2 DM:  Orginally diagnosed at the age of: in 30s.  Current Regimen:   Metformin XR 2000 mg with dinner.  Moujaro 15 mg/week.  Jardiance 25 mg/day  Semglee 36 units once a day  Humalog 1:15 + 1u per 40 >140   (1/2 of correction at beditme)  (about 18-20 units TID)    yes:     Diabetes Medication(s)       Biguanides       metFORMIN (GLUCOPHAGE XR) 500 MG 24 hr tablet Take 4 tablets (2,000 mg) by mouth daily (with dinner).       Insulin       insulin aspart (NOVOLOG PEN) 100 UNIT/ML pen Inject 1 unit/15 gm of CHO + ssi 1:40> 140 up to 80 units daily     Insulin Glargine-yfgn (SEMGLEE, YFGN,) 100 UNIT/ML SOLN Inject 35 Units subcutaneously daily.       Sodium-Glucose Co-Transporter 2 (SGLT2) Inhibitors       empagliflozin (JARDIANCE) 25 MG TABS tablet TAKE ONE TABLET BY MOUTH ONCE DAILY       Incretin Mimetic Agents       Tirzepatide 15 MG/0.5ML SOAJ Inject 0.5 mLs (15 mg) subcutaneously every 7 days.          Victoza-- had GI s/e.    BS checks: DEXCOM  Average Meter Download: Blood glucose data reviewed personally. See nursing note from this encounter for details.  Last A1c: 7.2%  Symptoms of hypoglycemia (low blood sugar):  Gets symptoms of hypoglycemia.    DM  Complications:   Complications:   Diabetes Complications  Description / Detail    Diabetic Retinopathy  No   CAD / PAD  No   Neuropathy  No   Nephropathy / Microalbuminuria  No   Gastroparesis  No   Hypoglycemia [place]  No       2. Hypertension:    on medications  3. Hyperlipidemia: on medications  Medications       HMG CoA Reductase Inhibitors     simvastatin (ZOCOR) 20 MG tablet            PMH/PSH:  Past Medical History:   Diagnosis Date     Diabetes (H)      Hypertension      Obese      Past Surgical History:   Procedure Laterality Date     GENITOURINARY SURGERY      Stricture repair     URETHROPLASTY STAGE ONE WITH BUCCAL GRAFT N/A 05/02/2018    Procedure: URETHROPLASTY STAGE ONE WITH BUCCAL GRAFT;  Posterior Urethroplasty with Single Buccal Mucosa Graft;  Surgeon: Herb Awad MD;  Location: [facility] OR     Family Hx:  Family History   Problem Relation Age of Onset     Cancer Mother         Gynecologic     Thyroid Disease Mother      Colon Cancer Mother      Depression Mother      Obesity Mother      No Known Problems Father      Cancer Maternal Grandmother      Diabetes Paternal Grandmother      Mental Illness Paternal Grandmother      Colon Cancer Cousin         Approximately late 30s     Other Cancer Maternal Grandfather      Diabetes Other      Depression Sister        Diabetes: maternal aunt, p.grandmother.    Social Hx:  Social History     Socioeconomic History     Marital status:      Spouse name: Not on file     Number of children: Not on file     Years of education: Not on file     Highest education level: Not on file   Occupational History     Not on file   Tobacco Use     Smoking status: Never     Passive exposure: Never     Smokeless tobacco: Never   Vaping Use     Vaping status: Never Used   Substance and Sexual Activity     Alcohol use: Yes     Comment: 2-3/month     Drug use: No     Sexual activity: Yes     Partners: Female     Birth control/protection: Pill   Other Topics Concern      Parent/sibling w/ CABG, MI or angioplasty before 65F 55M? No   Social History Narrative     Not on file     Social Drivers of Health     Financial Resource Strain: Low Risk  (4/29/2024)    Financial Resource Strain      Within the past 12 months, have you or your family members you live with been unable to get utilities (heat, electricity) when it was really needed?: No   Food Insecurity: Low Risk  (4/29/2024)    Food Insecurity      Within the past 12 months, did you worry that your food would run out before you got money to buy more?: No      Within the past 12 months, did the food you bought just not last and you didn t have money to get more?: No   Transportation Needs: Low Risk  (4/29/2024)    Transportation Needs      Within the past 12 months, has lack of transportation kept you from medical appointments, getting your medicines, non-medical meetings or appointments, work, or from getting things that you need?: No   Physical Activity: Insufficiently Active (4/29/2024)    Exercise Vital Sign      Days of Exercise per Week: 2 days      Minutes of Exercise per Session: 20 min   Stress: No Stress Concern Present (4/29/2024)    [employer] [place] [employer] - Occupational Stress Questionnaire      Feeling of Stress : Only a little   Social Connections: Unknown (4/29/2024)    Social Connection and Isolation Panel [NHANES]      Frequency of Communication with Friends and Family: Not on file      Frequency of Social Gatherings with Friends and Family: Once a week      Attends Tenriism Services: Not on file      Active Member of Clubs or Organizations: Not on file      Attends Club or Organization Meetings: Not on file      Marital Status: Not on file   Interpersonal Safety: Low Risk  (4/30/2024)    Interpersonal Safety      Do you feel physically and emotionally safe where you currently live?: Yes      Within the past 12 months, have you been hit, slapped, kicked or otherwise physically hurt by  someone?: No      Within the past 12 months, have you been humiliated or emotionally abused in other ways by your partner or ex-partner?: No   Housing Stability: Low Risk  (4/29/2024)    Housing Stability      Do you have housing? : Yes      Are you worried about losing your housing?: No          MEDICATIONS:  has a current medication list which includes the following prescription(s): blood glucose, blood glucose, blood glucose, blood glucose monitoring, dexcom g7 sensor, jardiance, insulin aspart, insulin glargine-yfgn, bd maty u/f, lisinopril, loratadine, metformin, multiple vitamins-minerals, nystatin, simvastatin, and tirzepatide.    ROS     ROS: 10 point ROS neg other than the symptoms noted above in the HPI.    Physical Exam   VS: There were no vitals taken for this visit.  GENERAL: healthy, alert and no distress  EYES: Eyes grossly normal to inspection, conjunctivae and sclerae normal  ENT: no nose swelling, nasal discharge.  Thyroid: no apparent thyroid nodules  RESP: no audible wheeze, cough, or visible cyanosis.  No visible retractions or increased work of breathing.  Able to speak fully in complete sentences.  ABDO: not evaluated.  EXTREMITIES: no hand tremors.  NEURO: Cranial nerves grossly intact, mentation intact and speech normal  SKIN: No apparent skin lesions, rash or edema seen   PSYCH: mentation appears normal, affect normal/bright, judgement and insight intact, normal speech and appearance well-groomed    LABS:  A1c:  Lab Results   Component Value Date    A1C 6.9 12/06/2024    A1C 7.2 04/30/2024    A1C 7.0 12/12/2023    A1C 7.3 09/02/2023    A1C 7.1 04/18/2023    A1C 8.4 04/28/2020     Creatinine:  Creatinine   Date Value Ref Range Status   04/30/2024 0.82 0.67 - 1.17 mg/dL Final   04/28/2020 0.65 (L) 0.66 - 1.25 mg/dL Final     Urine Micro:  Lab Results   Component Value Date    UMALCR  04/30/2024      Comment:      Unable to calculate, urine albumin and/or urine creatinine is outside detectable  limits.  Microalbuminuria is defined as an albumin:creatinine ratio of 17 to 299 for males and 25 to 299 for females. A ratio of albumin:creatinine of 300 or higher is indicative of overt proteinuria.  Due to biologic variability, positive results should be confirmed by a second, first-morning random or 24-hour timed urine specimen. If there is discrepancy, a third specimen is recommended. When 2 out of 3 results are in the microalbuminuria range, this is evidence for incipient nephropathy and warrants increased efforts at glucose control, blood pressure control, and institution of therapy with an angiotensin-converting-enzyme (ACE) inhibitor (if the patient can tolerate it).      UMALCR  03/02/2022      Comment:      Unable to calculate:  Urine creatinine or albumin value below detectable level    UMALCR Unable to calculate due to low value 04/28/2020       LFTs/Lipids:  Recent Labs   Lab Test 04/30/24  0800 04/26/23  0905   CHOL 144 127   HDL 58 49   LDL 69 59   TRIG 87 93     TFTs:  TSH   Date Value Ref Range Status   04/30/2024 3.38 0.30 - 4.20 uIU/mL Final   03/02/2022 2.20 0.40 - 4.00 mU/L Final     All pertinent notes, labs, and images personally reviewed by me.     Glucometer/ insulin pump (if applicable)/ CGM data (if applicable) downloaded, Personally reviewed and interpreted.  All Blood sugar data reviewed personally and discussed with pt.      A/P  Mr.Ryan PHONG Boyle is a 44 year old here for the evaluation/management of diabetes:    1. DM2 - Under fair control.  A1c 6.9%  No know complications.  BG high during day. Average  with TIR 57%.  No major episodes of hypoglycemia noted/reported.   Plan:  Discussed diagnosis, pathophysiology, management and treatment options of condition with pt.  I also discussed importance of strict blood sugar control to prevent complications associated with uncontrolled diabetes.  Continue current DM regimen. ( In the setting of recent increase in Mounjaro dose)  Metformin  XR 2000 mg with dinner.  Moujaro 15 mg/week.  Jardiance 25 mg/day  Semglee 36 units once a day  Humalog 2 unit/10 gm of CHO + ssi 1:50> 140.     Follow up with CDE in 4-6 weeks ( BG review after being on Mounjaro 15 mg/week and possible titration of insulin)  Continue to use DEXCOM  If you need Rx for Eversense, please inform us.  Labs and follow up in 6 months  Please make a lab appointment for blood work and follow up clinic appointment in 1 week after that to discuss results.    2. Hypertension - Under Good control.  On lisinopril.    3. Hyperlipidemia - On simvastatin 20 mg/day. LDL 59    4. Prevention:  Opthalmology- recommend annually  ASA-not indicated.  Smoking- No    Most Recent Immunizations   Administered Date(s) Administered     COVID-19 12+ (Pfizer) 11/19/2024     COVID-19 Bivalent 18+ (Moderna) 10/19/2022     COVID-19 MONOVALENT 12+ (Pfizer) 12/28/2021     COVID-19 Vaccine (Bouchra) 12/28/2021     HEPATITIS A (PEDS 12M-18Y) 02/21/2000     Hepatitis B, Adult 09/11/2013     Hepatitis B, Peds 04/12/1999     Influenza (IIV3) PF 09/11/2013     Influenza Vaccine >6 months,quad, PF 12/22/2022     Influenza Vaccine, 6+MO IM (QUADRIVALENT W/PRESERVATIVES) 11/26/2019     Influenza, Split Virus, Trivalent, Pf (Fluzone\Fluarix) 11/19/2024     Influenza,INJ,MDCK,PF,Quad >6mo(Flucelvax) 11/07/2023     MMR 08/05/1992     Pneumococcal 23 valent 11/17/2008     TDAP (Adacel,Boostrix) 04/26/2023     Td (Adult), Adsorbed 11/10/1995     Plan: empagliflozin (JARDIANCE) 25 MG TABS tablet,         Creatinine, Albumin Random Urine Quantitative         with Creat Ratio, Hemoglobin A1c, GLUCOSE         MONITOR, 72 HOUR, PHYS INTERP        Plan: Continuous Glucose Sensor (DEXCOM G7 SENSOR)         MISC, metFORMIN (GLUCOPHAGE XR) 500 MG 24 hr         tablet   Plan: Insulin Glargine-yfgn (SEMGLEE, YFGN,) 100         UNIT/ML SOLN      Plan: metFORMIN (GLUCOPHAGE XR) 500 MG 24 hr tablet          Recommend checking blood sugars before  meals and at bedtime.    If Blood glucose are low more often-> 2-3 times/week- give us a call.  The patient is advised to Make better food choices: reduce carbs, Reduce portion size, weight loss and exercise 3-4 times a week.  Discussed hypoglycemia signs and symptoms as well as management in detail.    There is some variability among people, most will usually develop symptoms suggestive of hypoglycemia when blood glucose levels are lowered to the mid 60's. The first set of symptoms are called adrenergic. Patients may experience any of the following nervousness, sweating, intense hunger, trembling, weakness, palpitations, and difficulty speaking.   The acute management of hypoglycemia involves the rapid delivery of a source of easily absorbed sugar. Regular soda, juice, lifesavers, table sugar, are good options. 15 grams of glucose is the dose that is given, followed by an assessment of symptoms and a blood glucose check if possible. If after 10 minutes there is no improvement, another 10-15 grams should be given. This can be repeated up to three times. The equivalency of 10-15 grams of glucose (approximate servings) are: Four lifesavers, 4 teaspoons of sugar, or 1/2 can of regular soda or juice.     Discussed indications, risks and benefits of all medications prescribed, and answered questions to patient's satisfaction.  The longitudinal plan of care for the diagnosis(es)/condition(s) as documented were addressed during this visit. Due to the added complexity in care, I will continue to support Clarence in the subsequent management and with ongoing continuity of care.  All questions were answered.  The patient indicates understanding of the above issues and agrees with the plan set forth.     Follow-up:  As noted in AVS    Nayana Salinas M.D  Endocrinology  [facility]/[place]  CC: Damien Contreras    Disclaimer: This note consists of symbols derived from keyboarding, dictation and/or voice recognition  software. As a result, there may be errors in the script that have gone undetected. Please consider this when interpreting information found in this chart.    Addendum to above note and clinic visit:    Labs reviewed.    See result note/telephone encounter.                    Again, thank you for allowing me to participate in the care of your patient.        Sincerely,        Nayana Salinas MD    Electronically signed

## 2025-01-06 NOTE — NURSING NOTE
Current patient location:  [place]    Is the patient currently in the state of [place]? YES    Visit mode:VIDEO    If the visit is dropped, the patient can be reconnected by:VIDEO VISIT: Text to cell phone:   Telephone Information:   Mobile [phone]       Will anyone else be joining the visit? NO  (If patient encounters technical issues they should call [phone] :[phone])    Are changes needed to the allergy or medication list? No    Are refills needed on medications prescribed by this physician? NO    Rooming Documentation:  Questionnaire(s) completed    Reason for visit: Video Visit and RECHECK    Swathi PICKARD

## 2025-01-06 NOTE — PROGRESS NOTES
"THIS IS A VIDEO VISIT:    Phone call visit/virtual visit encounter:    Name of patient: Clarence Boyle    Date of encounter: 1/6/2025    Time of start of video visit: 11:04    Video started: 11:10    Video ended: 11:26    Provider location: working from home/ [facility]    Patient location: patients home.    Mode of transmission: [employer] video/ Live Mobile    Verbal consent: obtained before starting visit. Pt is agreeable.      The patient has been notified of following:      \"This VIDEO visit will be conducted via a call between you and your physician/provider. We have found that certain health care needs can be provided without the need for a physical exam.  This service lets us provide the care you need with a short phone conversation.  If a prescription is necessary we can send it directly to your pharmacy.  If lab work is needed we can place an order for that and you can then stop by our lab to have the test done at a later time.     With new updates with corona virus patient might be billed as clinic visit.     If during the course of the call the physician/provider feels a telephone visit is not appropriate, you will not be charged for this service.\"      Past medical history, social history, family history, allergy and medications were reviewed and updated as appropriate.  Reviewed pertinent labs, notes, imaging studies personally.    ENDOCRINOLOGY CLINIC NOTE:  Name: Clarence Boyle  Seen for follow up of type 2 DM.  HPI:  Clarence Boyle is a 44 year old male who presents for the evaluation/management of Diabetes.   has a past medical history of Diabetes (H), Hypertension, and Obese.    Was seen by  before at [facility].  Was followed by CDE closely as well.  Available records, labs and images from outside clinic were personally reviewed.    Reports on and off low BG.  Appetite is OK.  Weight: mostly stable.    Has never been on insulin pump. He might be interested in future which is compatible with Dexcom G7. " But currently he is OK with MDI.  He is not sure if insulin pump is covered by insurance.  He had GI s/e on Victoza.  Using DEXCOM G7.  Recently started Mounjaro 15 mg/week. Tolerating ok.  Lost about 27 lbs so far.  Working closely with CDE.    Considering Eversense.    Wt Readings from Last 2 Encounters:   04/30/24 120 kg (264 lb 8 oz)   04/26/23 119.7 kg (264 lb)       1. Type 2 DM:  Orginally diagnosed at the age of: in 30s.  Current Regimen:   Metformin XR 2000 mg with dinner.  Moujaro 15 mg/week.  Jardiance 25 mg/day  Semglee 36 units once a day  Humalog 1:15 + 1u per 40 >140   (1/2 of correction at beditme)  (about 18-20 units TID)    yes:     Diabetes Medication(s)       Biguanides       metFORMIN (GLUCOPHAGE XR) 500 MG 24 hr tablet Take 4 tablets (2,000 mg) by mouth daily (with dinner).       Insulin       insulin aspart (NOVOLOG PEN) 100 UNIT/ML pen Inject 1 unit/15 gm of CHO + ssi 1:40> 140 up to 80 units daily     Insulin Glargine-yfgn (SEMGLEE, YFGN,) 100 UNIT/ML SOLN Inject 35 Units subcutaneously daily.       Sodium-Glucose Co-Transporter 2 (SGLT2) Inhibitors       empagliflozin (JARDIANCE) 25 MG TABS tablet TAKE ONE TABLET BY MOUTH ONCE DAILY       Incretin Mimetic Agents       Tirzepatide 15 MG/0.5ML SOAJ Inject 0.5 mLs (15 mg) subcutaneously every 7 days.          Victoza-- had GI s/e.    BS checks: DEXCOM  Average Meter Download: Blood glucose data reviewed personally. See nursing note from this encounter for details.  Last A1c: 7.2%  Symptoms of hypoglycemia (low blood sugar):  Gets symptoms of hypoglycemia.    DM Complications:   Complications:   Diabetes Complications  Description / Detail    Diabetic Retinopathy  No   CAD / PAD  No   Neuropathy  No   Nephropathy / Microalbuminuria  No   Gastroparesis  No   Hypoglycemia [place]  No       2. Hypertension:    on medications  3. Hyperlipidemia: on medications  Medications       HMG CoA Reductase Inhibitors     simvastatin (ZOCOR) 20 MG tablet             PMH/PSH:  Past Medical History:   Diagnosis Date    Diabetes (H)     Hypertension     Obese      Past Surgical History:   Procedure Laterality Date    GENITOURINARY SURGERY      Stricture repair    URETHROPLASTY STAGE ONE WITH BUCCAL GRAFT N/A 05/02/2018    Procedure: URETHROPLASTY STAGE ONE WITH BUCCAL GRAFT;  Posterior Urethroplasty with Single Buccal Mucosa Graft;  Surgeon: Herb Awad MD;  Location: [facility] OR     Family Hx:  Family History   Problem Relation Age of Onset    Cancer Mother         Gynecologic    Thyroid Disease Mother     Colon Cancer Mother     Depression Mother     Obesity Mother     No Known Problems Father     Cancer Maternal Grandmother     Diabetes Paternal Grandmother     Mental Illness Paternal Grandmother     Colon Cancer Cousin         Approximately late 30s    Other Cancer Maternal Grandfather     Diabetes Other     Depression Sister        Diabetes: maternal aunt, p.grandmother.    Social Hx:  Social History     Socioeconomic History    Marital status:      Spouse name: Not on file    Number of children: Not on file    Years of education: Not on file    Highest education level: Not on file   Occupational History    Not on file   Tobacco Use    Smoking status: Never     Passive exposure: Never    Smokeless tobacco: Never   Vaping Use    Vaping status: Never Used   Substance and Sexual Activity    Alcohol use: Yes     Comment: 2-3/month    Drug use: No    Sexual activity: Yes     Partners: Female     Birth control/protection: Pill   Other Topics Concern    Parent/sibling w/ CABG, MI or angioplasty before 65F 55M? No   Social History Narrative    Not on file     Social Drivers of Health     Financial Resource Strain: Low Risk  (4/29/2024)    Financial Resource Strain     Within the past 12 months, have you or your family members you live with been unable to get utilities (heat, electricity) when it was really needed?: No   Food Insecurity: Low Risk  (4/29/2024)     Food Insecurity     Within the past 12 months, did you worry that your food would run out before you got money to buy more?: No     Within the past 12 months, did the food you bought just not last and you didn t have money to get more?: No   Transportation Needs: Low Risk  (4/29/2024)    Transportation Needs     Within the past 12 months, has lack of transportation kept you from medical appointments, getting your medicines, non-medical meetings or appointments, work, or from getting things that you need?: No   Physical Activity: Insufficiently Active (4/29/2024)    Exercise Vital Sign     Days of Exercise per Week: 2 days     Minutes of Exercise per Session: 20 min   Stress: No Stress Concern Present (4/29/2024)    [place] [employer] - Occupational Stress Questionnaire     Feeling of Stress : Only a little   Social Connections: Unknown (4/29/2024)    Social Connection and Isolation Panel [NHANES]     Frequency of Communication with Friends and Family: Not on file     Frequency of Social Gatherings with Friends and Family: Once a week     Attends Synagogue Services: Not on file     Active Member of Clubs or Organizations: Not on file     Attends Club or Organization Meetings: Not on file     Marital Status: Not on file   Interpersonal Safety: Low Risk  (4/30/2024)    Interpersonal Safety     Do you feel physically and emotionally safe where you currently live?: Yes     Within the past 12 months, have you been hit, slapped, kicked or otherwise physically hurt by someone?: No     Within the past 12 months, have you been humiliated or emotionally abused in other ways by your partner or ex-partner?: No   Housing Stability: Low Risk  (4/29/2024)    Housing Stability     Do you have housing? : Yes     Are you worried about losing your housing?: No          MEDICATIONS:  has a current medication list which includes the following prescription(s): blood glucose, blood glucose, blood glucose, blood  glucose monitoring, dexcom g7 sensor, jardiance, insulin aspart, insulin glargine-yfgn, bd maty u/f, lisinopril, loratadine, metformin, multiple vitamins-minerals, nystatin, simvastatin, and tirzepatide.    ROS     ROS: 10 point ROS neg other than the symptoms noted above in the HPI.    Physical Exam   VS: There were no vitals taken for this visit.  GENERAL: healthy, alert and no distress  EYES: Eyes grossly normal to inspection, conjunctivae and sclerae normal  ENT: no nose swelling, nasal discharge.  Thyroid: no apparent thyroid nodules  RESP: no audible wheeze, cough, or visible cyanosis.  No visible retractions or increased work of breathing.  Able to speak fully in complete sentences.  ABDO: not evaluated.  EXTREMITIES: no hand tremors.  NEURO: Cranial nerves grossly intact, mentation intact and speech normal  SKIN: No apparent skin lesions, rash or edema seen   PSYCH: mentation appears normal, affect normal/bright, judgement and insight intact, normal speech and appearance well-groomed    LABS:  A1c:  Lab Results   Component Value Date    A1C 6.9 12/06/2024    A1C 7.2 04/30/2024    A1C 7.0 12/12/2023    A1C 7.3 09/02/2023    A1C 7.1 04/18/2023    A1C 8.4 04/28/2020     Creatinine:  Creatinine   Date Value Ref Range Status   04/30/2024 0.82 0.67 - 1.17 mg/dL Final   04/28/2020 0.65 (L) 0.66 - 1.25 mg/dL Final     Urine Micro:  Lab Results   Component Value Date    UMALCR  04/30/2024      Comment:      Unable to calculate, urine albumin and/or urine creatinine is outside detectable limits.  Microalbuminuria is defined as an albumin:creatinine ratio of 17 to 299 for males and 25 to 299 for females. A ratio of albumin:creatinine of 300 or higher is indicative of overt proteinuria.  Due to biologic variability, positive results should be confirmed by a second, first-morning random or 24-hour timed urine specimen. If there is discrepancy, a third specimen is recommended. When 2 out of 3 results are in the  microalbuminuria range, this is evidence for incipient nephropathy and warrants increased efforts at glucose control, blood pressure control, and institution of therapy with an angiotensin-converting-enzyme (ACE) inhibitor (if the patient can tolerate it).      UMALCR  03/02/2022      Comment:      Unable to calculate:  Urine creatinine or albumin value below detectable level    UMALCR Unable to calculate due to low value 04/28/2020       LFTs/Lipids:  Recent Labs   Lab Test 04/30/24  0800 04/26/23  0905   CHOL 144 127   HDL 58 49   LDL 69 59   TRIG 87 93     TFTs:  TSH   Date Value Ref Range Status   04/30/2024 3.38 0.30 - 4.20 uIU/mL Final   03/02/2022 2.20 0.40 - 4.00 mU/L Final     All pertinent notes, labs, and images personally reviewed by me.     Glucometer/ insulin pump (if applicable)/ CGM data (if applicable) downloaded, Personally reviewed and interpreted.  All Blood sugar data reviewed personally and discussed with pt.      A/P  Mr.Ryan PHONG Boyle is a 44 year old here for the evaluation/management of diabetes:    1. DM2 - Under fair control.  A1c 6.9%  No know complications.  BG high during day. Average  with TIR 57%.  No major episodes of hypoglycemia noted/reported.   Plan:  Discussed diagnosis, pathophysiology, management and treatment options of condition with pt.  I also discussed importance of strict blood sugar control to prevent complications associated with uncontrolled diabetes.  Continue current DM regimen. ( In the setting of recent increase in Mounjaro dose)  Metformin XR 2000 mg with dinner.  Moujaro 15 mg/week.  Jardiance 25 mg/day  Semglee 36 units once a day  Humalog 2 unit/10 gm of CHO + ssi 1:50> 140.     Follow up with CDE in 4-6 weeks ( BG review after being on Mounjaro 15 mg/week and possible titration of insulin)  Continue to use DEXCOM  If you need Rx for Eversense, please inform us.  Labs and follow up in 6 months  Please make a lab appointment for blood work and follow up  clinic appointment in 1 week after that to discuss results.    2. Hypertension - Under Good control.  On lisinopril.    3. Hyperlipidemia - On simvastatin 20 mg/day. LDL 59    4. Prevention:  Opthalmology- recommend annually  ASA-not indicated.  Smoking- No    Most Recent Immunizations   Administered Date(s) Administered    COVID-19 12+ (Pfizer) 11/19/2024    COVID-19 Bivalent 18+ (Moderna) 10/19/2022    COVID-19 MONOVALENT 12+ (Pfizer) 12/28/2021    COVID-19 Vaccine (Bouchra) 12/28/2021    HEPATITIS A (PEDS 12M-18Y) 02/21/2000    Hepatitis B, Adult 09/11/2013    Hepatitis B, Peds 04/12/1999    Influenza (IIV3) PF 09/11/2013    Influenza Vaccine >6 months,quad, PF 12/22/2022    Influenza Vaccine, 6+MO IM (QUADRIVALENT W/PRESERVATIVES) 11/26/2019    Influenza, Split Virus, Trivalent, Pf (Fluzone\Fluarix) 11/19/2024    Influenza,INJ,MDCK,PF,Quad >6mo(Flucelvax) 11/07/2023    MMR 08/05/1992    Pneumococcal 23 valent 11/17/2008    TDAP (Adacel,Boostrix) 04/26/2023    Td (Adult), Adsorbed 11/10/1995     Plan: empagliflozin (JARDIANCE) 25 MG TABS tablet,         Creatinine, Albumin Random Urine Quantitative         with Creat Ratio, Hemoglobin A1c, GLUCOSE         MONITOR, 72 HOUR, PHYS INTERP        Plan: Continuous Glucose Sensor (DEXCOM G7 SENSOR)         MISC, metFORMIN (GLUCOPHAGE XR) 500 MG 24 hr         tablet   Plan: Insulin Glargine-yfgn (SEMGLEE, YFGN,) 100         UNIT/ML SOLN      Plan: metFORMIN (GLUCOPHAGE XR) 500 MG 24 hr tablet          Recommend checking blood sugars before meals and at bedtime.    If Blood glucose are low more often-> 2-3 times/week- give us a call.  The patient is advised to Make better food choices: reduce carbs, Reduce portion size, weight loss and exercise 3-4 times a week.  Discussed hypoglycemia signs and symptoms as well as management in detail.    There is some variability among people, most will usually develop symptoms suggestive of hypoglycemia when blood glucose levels are  lowered to the mid 60's. The first set of symptoms are called adrenergic. Patients may experience any of the following nervousness, sweating, intense hunger, trembling, weakness, palpitations, and difficulty speaking.   The acute management of hypoglycemia involves the rapid delivery of a source of easily absorbed sugar. Regular soda, juice, lifesavers, table sugar, are good options. 15 grams of glucose is the dose that is given, followed by an assessment of symptoms and a blood glucose check if possible. If after 10 minutes there is no improvement, another 10-15 grams should be given. This can be repeated up to three times. The equivalency of 10-15 grams of glucose (approximate servings) are: Four lifesavers, 4 teaspoons of sugar, or 1/2 can of regular soda or juice.     Discussed indications, risks and benefits of all medications prescribed, and answered questions to patient's satisfaction.  The longitudinal plan of care for the diagnosis(es)/condition(s) as documented were addressed during this visit. Due to the added complexity in care, I will continue to support Clarence in the subsequent management and with ongoing continuity of care.  All questions were answered.  The patient indicates understanding of the above issues and agrees with the plan set forth.     Follow-up:  As noted in AVS    Nayana Salinas M.D  Endocrinology  [facility]  CC: Damien Contreras    Disclaimer: This note consists of symbols derived from keyboarding, dictation and/or voice recognition software. As a result, there may be errors in the script that have gone undetected. Please consider this when interpreting information found in this chart.    Addendum to above note and clinic visit:    Labs reviewed.    See result note/telephone encounter.

## 2025-01-06 NOTE — PATIENT INSTRUCTIONS
[facility]  Dr Salinas, Endocrinology Department    [facility]   [address] [facility]  [place] [address]  Appointment [phone]  Fax: [phone]  [place]: Monday - Thursday      To provide the best diabetic care, please bring your blood glucose meter to each and every visit with your Endocrinologist.  Your blood glucose meter/insulin pump will be downloaded at every appointment.    Please arrive 15 minutes before your scheduled appointment.  This will allow for your blood glucose meter/insulin pump to be downloaded.  If you are wearing DEXCOM please bring  or sharing code so that it can be downloaded.  If you are using freestyle aleshia personal sensors please bring the reader.  If you are using TANDEM insulin pump please have your username and password to get info from [employer]andem website.    Continue current DM regimen.  Metformin XR 2000 mg with dinner.  Moujaro 15 mg/week.  Jardiance 25 mg/day  Semglee 36 units once a day  Humalog 2 unit/10 gm of CHO + ssi 1:50> 140.     Follow up with CDE in 4-6 weeks  Continue to use DEXCOM  If you need Rx for Eversense, please inform us.  Labs and follow up in 6 months  Please make a lab appointment for blood work and follow up clinic appointment in 1 week after that to discuss results.  ( Recommend in person visit)    Recommend checking blood sugars before meals and at bedtime.    If Blood glucose are low more often-> 2-3 times/week- give us a call.  Make better food choices: reduce carbs, Reduce portion size, weight loss and exercise 3-4 times a week.    What is hypoglycemia:  Hypoglycemia is when blood sugar levels become too low - below 70 m/dl.      What causes hypoglycemia?  - using too much insulin  -taking too many diabetes pills  -not eating enough, or skipping meals or snacks  -not eating enough carbohydrate with meals  -changing your exercise routine  -drinking alcohol in excess    It is also  possible to have hypoglycemia even when you are carefully managing your blood sugar levels.    What does it feel like when blood sugars get too low?  You may feel:  - anxious  -confused  -dizzy  -hungry  -light-headed  -nervous  -shaky  -sleepy  -sweaty    You may have  -blurred or cloudy vision  -heart palpitations (heart skips a beat or races)  -tingling or numbness around the mouth and tongue  -tremors    What to do if you have symptoms of hypoglycmemia:  If you think your blood sugar is too low, check it with a glucose meter.  If its below 70 mg/dl, consume one of the following:  Fruit juice (1/2 cup)  Glucose tablets (15 grams)  Hard candy (5 to 7 pieces)  Honey or sugar (2 teaspoons)  Milk (1/2 cup)  Soft drink (non-diet, 1/2 cup)    Wait 15 minutes and check your blood glucose again.  IF it is still below 70 mg/dl, have another food item listed above. Wait another 15 minutes and repeat the blood glucose test.  Have a small meal or snack that contains some carbohydrate after your blood glucose rises above 70 mg/dl.    If you are at risk of hypoglycemia, always carry with you glucose tablets or one of the foods listed above.      To prevent Hypoglycemia:  Avoid situations that may cause hypoglycemia  Before making any change to your diet or exercise routine, discuss them with your healthcare provider  Keep a record of your blood glucose levels.  Include the time of day, diabetes medications, when you had your last meal or snack, and what you were doing at the time (e.g. Watching TV, gardening, jogging, etc).    Talk to your healthcare provider if your blood glucose levels are often low        Patient guide on hypoglycemia    http://[email or web address].org/Resources/upload/patient-guide-diagnosis-and-management-[email or web address]

## 2025-02-04 ENCOUNTER — MYC REFILL (OUTPATIENT)
Dept: ENDOCRINOLOGY | Facility: CLINIC | Age: 45
End: 2025-02-04
Payer: COMMERCIAL

## 2025-02-04 DIAGNOSIS — Z79.4 TYPE 2 DIABETES MELLITUS WITH HYPERGLYCEMIA, WITH LONG-TERM CURRENT USE OF INSULIN (H): ICD-10-CM

## 2025-02-04 DIAGNOSIS — E11.65 TYPE 2 DIABETES MELLITUS WITH HYPERGLYCEMIA, WITH LONG-TERM CURRENT USE OF INSULIN (H): ICD-10-CM

## 2025-02-05 NOTE — TELEPHONE ENCOUNTER
Please schedule CDE appt, first available.    Per 1/6/25 VV:  Follow up with CDE in 4-6 weeks ( BG review after being on Mounjaro 15 mg/week and possible titration of insulin)     Requested Prescriptions   Pending Prescriptions Disp Refills    empagliflozin (JARDIANCE) 25 MG TABS tablet 30 tablet 0     Sig: Take 1 tablet (25 mg) by mouth daily.       Sodium Glucose Co-Transport Inhibitor Agents Passed - 2/5/2025  9:55 AM        Passed - Patient has documented A1c within the specified period of time.     If HgbA1C is 8 or greater, it needs to be on file within the past 3 months.  If less than 8, must be on file within the past 6 months.     Recent Labs   Lab Test 12/06/24  1133 04/28/20  0904 09/23/19  0000   A1C 6.9*   < >  --    HEMOGLOBINA1  --   --  7.5*    < > = values in this interval not displayed.             Passed - Medication is active on med list        Passed - Recent (6 mo) or future (90 days) visit within the authorizing provider's specialty     The patient must have completed an in-person or virtual visit within the past 6 months or has a future visit scheduled within the next 90 days with the authorizing provider s specialty.  Urgent care and e-visits do not quality as an office visit for this protocol.          Passed - Medication indicated for associated diagnosis     Medication is associated with one or more of the following diagnoses:     Diabetic nephropathy, With Albuminuria - Type 2 diabetes mellitus     Disorder of cardiovascular system; Prophylaxis - Type 2 diabetes mellitus     Type 2 diabetes mellitus    Disorder of cardiovascular system; Prophylaxis - Heart failure   Chronic kidney disease, (At risk of progression) to reduce the risk of sustained   estimated GFR decline, end-stage kidney disease, cardiovascular death,   and hospitalization for heart failure     Heart failure, (NYHA class II to IV, reduced ejection fraction) to reduce risk of  cardiovascular death and hospitalization            Passed - Has GFR on file in past 12 months and most recent value is >30        Passed - Patient is age 18 or older        Passed - Patient has documented normal Potassium within the last 12 mos.     Recent Labs   Lab Test 04/30/24  0800   POTASSIUM 4.7

## 2025-02-05 NOTE — TELEPHONE ENCOUNTER
02.05- x1 Left message for patient to call and schedule with Diabetes Educators per Dr. Salinas. Diabetes Education scheduling at [phone]

## 2025-02-07 NOTE — TELEPHONE ENCOUNTER
02.07- x2 Left message for patient to call and schedule with [facility] Educators per Dr. Salinas. Diabetes Education scheduling at [phone]

## 2025-02-11 NOTE — TELEPHONE ENCOUNTER
02.11- x3 Left message for patient to call and schedule with [facility] Educators per Dr. Salinas. Diabetes Education scheduling at [phone]

## 2025-02-13 NOTE — TELEPHONE ENCOUNTER
Dear MsArtem Ayan:    Cliff Duarte MD     No response from patient

## 2025-03-15 DIAGNOSIS — E66.01 MORBID OBESITY (H): ICD-10-CM

## 2025-03-15 DIAGNOSIS — E11.9 TYPE 2 DIABETES MELLITUS WITHOUT COMPLICATION, UNSPECIFIED WHETHER LONG TERM INSULIN USE (H): ICD-10-CM

## 2025-03-17 RX ORDER — SIMVASTATIN 20 MG
20 TABLET ORAL DAILY
Qty: 90 TABLET | Refills: 0 | Status: SHIPPED | OUTPATIENT
Start: 2025-03-17

## 2025-04-07 DIAGNOSIS — E11.9 TYPE 2 DIABETES MELLITUS WITHOUT COMPLICATION, UNSPECIFIED WHETHER LONG TERM INSULIN USE (H): ICD-10-CM

## 2025-04-07 DIAGNOSIS — E66.01 MORBID OBESITY (H): ICD-10-CM

## 2025-04-07 RX ORDER — LISINOPRIL 10 MG/1
TABLET ORAL
Qty: 90 TABLET | Refills: 0 | Status: SHIPPED | OUTPATIENT
Start: 2025-04-07

## 2025-04-09 ENCOUNTER — MYC REFILL (OUTPATIENT)
Dept: EDUCATION SERVICES | Facility: CLINIC | Age: 45
End: 2025-04-09
Payer: COMMERCIAL

## 2025-04-09 DIAGNOSIS — Z79.4 TYPE 2 DIABETES MELLITUS WITH HYPERGLYCEMIA, WITH LONG-TERM CURRENT USE OF INSULIN (H): ICD-10-CM

## 2025-04-09 DIAGNOSIS — E11.65 TYPE 2 DIABETES MELLITUS WITH HYPERGLYCEMIA, WITH LONG-TERM CURRENT USE OF INSULIN (H): ICD-10-CM

## 2025-04-10 RX ORDER — INSULIN LISPRO 100 [IU]/ML
INJECTION, SOLUTION INTRAVENOUS; SUBCUTANEOUS
Qty: 75 ML | Refills: 0 | Status: SHIPPED | OUTPATIENT
Start: 2025-04-10

## 2025-04-10 NOTE — TELEPHONE ENCOUNTER
Requested Prescriptions   Signed Prescriptions Disp Refills    insulin lispro (HUMALOG KWIKPEN) 100 UNIT/ML (1 unit dial) KWIKPEN 75 mL 0     Sig: Inject 1 unit/15 gm of CHO + ssi 1:40> 140 up to 80 units daily       There is no refill protocol information for this order

## 2025-05-01 DIAGNOSIS — Z79.4 TYPE 2 DIABETES MELLITUS WITH HYPERGLYCEMIA, WITH LONG-TERM CURRENT USE OF INSULIN (H): ICD-10-CM

## 2025-05-01 DIAGNOSIS — E11.65 TYPE 2 DIABETES MELLITUS WITH HYPERGLYCEMIA, WITH LONG-TERM CURRENT USE OF INSULIN (H): ICD-10-CM

## 2025-05-01 RX ORDER — EMPAGLIFLOZIN 25 MG/1
25 TABLET, FILM COATED ORAL DAILY
Qty: 90 TABLET | Refills: 0 | Status: SHIPPED | OUTPATIENT
Start: 2025-05-01

## 2025-05-01 NOTE — TELEPHONE ENCOUNTER
Requested Prescriptions   Pending Prescriptions Disp Refills    JARDIANCE 25 MG TABS tablet [Pharmacy Med Name: JARDIANCE 25MG TABLETS] 90 tablet 0     Sig: TAKE 1 TABLET(25 MG) BY MOUTH DAILY       Sodium Glucose Co-Transport Inhibitor Agents Failed - 5/1/2025  8:02 AM        Failed - Has GFR on file in past 12 months and most recent value is >30        Failed - Patient has documented normal Potassium within the last 12 mos.     Recent Labs   Lab Test 04/30/24  0800   POTASSIUM 4.7             Passed - Patient has documented A1c within the specified period of time.     If HgbA1C is 8 or greater, it needs to be on file within the past 3 months.  If less than 8, must be on file within the past 6 months.     Recent Labs   Lab Test 12/06/24  1133 04/28/20  0904 09/23/19  0000   A1C 6.9*   < >  --    HEMOGLOBINA1  --   --  7.5*    < > = values in this interval not displayed.             Passed - Medication is active on med list and the sig matches. RN to manually verify dose and sig if red X/fail.     If the protocol passes (green check), you do not need to verify med dose and sig.    A prescription matches if they are the same clinical intention.    For Example: once daily and every morning are the same.    The protocol can not identify upper and lower case letters as matching and will fail.     For Example: Take 1 tablet (50 mg) by mouth daily     TAKE 1 TABLET (50 MG) BY MOUTH DAILY    For all fails (red x), verify dose and sig.    If the refill does match what is on file, the RN can still proceed to approve the refill request.       If they do not match, route to the appropriate provider.             Passed - Recent (6 mo) or future (90 days) visit within the authorizing provider's specialty     The patient must have completed an in-person or virtual visit within the past 6 months or has a future visit scheduled within the next 90 days with the authorizing provider s specialty.  Urgent care and e-visits do not  quality as an office visit for this protocol.          Passed - Medication indicated for associated diagnosis     Medication is associated with one or more of the following diagnoses:     Diabetic nephropathy, With Albuminuria - Type 2 diabetes mellitus     Disorder of cardiovascular system; Prophylaxis - Type 2 diabetes mellitus     Type 2 diabetes mellitus    Disorder of cardiovascular system; Prophylaxis - Heart failure   Chronic kidney disease, (At risk of progression) to reduce the risk of sustained   estimated GFR decline, end-stage kidney disease, cardiovascular death,   and hospitalization for heart failure     Heart failure, (NYHA class II to IV, reduced ejection fraction) to reduce risk of  cardiovascular death and hospitalization           Passed - Patient is age 18 or older

## 2025-05-02 PROBLEM — E11.69 TYPE 2 DIABETES MELLITUS WITH OTHER SPECIFIED COMPLICATION, WITH LONG-TERM CURRENT USE OF INSULIN (H): Status: ACTIVE | Noted: 2020-12-01

## 2025-05-02 PROBLEM — Z79.4 TYPE 2 DIABETES MELLITUS WITH OTHER SPECIFIED COMPLICATION, WITH LONG-TERM CURRENT USE OF INSULIN (H): Status: ACTIVE | Noted: 2020-12-01

## 2025-05-26 ENCOUNTER — PATIENT OUTREACH (OUTPATIENT)
Dept: CARE COORDINATION | Facility: CLINIC | Age: 45
End: 2025-05-26
Payer: COMMERCIAL

## 2025-06-07 ENCOUNTER — HEALTH MAINTENANCE LETTER (OUTPATIENT)
Age: 45
End: 2025-06-07

## 2025-06-10 DIAGNOSIS — E11.9 TYPE 2 DIABETES MELLITUS WITHOUT COMPLICATION, UNSPECIFIED WHETHER LONG TERM INSULIN USE (H): ICD-10-CM

## 2025-06-10 DIAGNOSIS — E66.01 MORBID OBESITY (H): ICD-10-CM

## 2025-06-10 RX ORDER — SIMVASTATIN 20 MG
20 TABLET ORAL DAILY
Qty: 90 TABLET | Refills: 0 | Status: SHIPPED | OUTPATIENT
Start: 2025-06-10

## 2025-07-01 ENCOUNTER — TRANSFERRED RECORDS (OUTPATIENT)
Dept: MULTI SPECIALTY CLINIC | Facility: CLINIC | Age: 45
End: 2025-07-01

## 2025-07-01 ENCOUNTER — LAB (OUTPATIENT)
Dept: LAB | Facility: CLINIC | Age: 45
End: 2025-07-01
Payer: COMMERCIAL

## 2025-07-01 DIAGNOSIS — E11.65 TYPE 2 DIABETES MELLITUS WITH HYPERGLYCEMIA, WITH LONG-TERM CURRENT USE OF INSULIN (H): ICD-10-CM

## 2025-07-01 DIAGNOSIS — Z79.4 TYPE 2 DIABETES MELLITUS WITH HYPERGLYCEMIA, WITH LONG-TERM CURRENT USE OF INSULIN (H): ICD-10-CM

## 2025-07-01 LAB
CREAT SERPL-MCNC: 0.63 MG/DL (ref 0.67–1.17)
CREAT UR-MCNC: 66.3 MG/DL
EGFRCR SERPLBLD CKD-EPI 2021: >90 ML/MIN/1.73M2
EST. AVERAGE GLUCOSE BLD GHB EST-MCNC: 143 MG/DL
HBA1C MFR BLD: 6.6 % (ref 0–5.6)
MICROALBUMIN UR-MCNC: <12 MG/L
MICROALBUMIN/CREAT UR: NORMAL MG/G{CREAT}
RETINOPATHY: NORMAL

## 2025-07-01 PROCEDURE — 3044F HG A1C LEVEL LT 7.0%: CPT

## 2025-07-01 PROCEDURE — 83036 HEMOGLOBIN GLYCOSYLATED A1C: CPT

## 2025-07-01 PROCEDURE — 82043 UR ALBUMIN QUANTITATIVE: CPT

## 2025-07-01 PROCEDURE — 82565 ASSAY OF CREATININE: CPT

## 2025-07-01 PROCEDURE — 82570 ASSAY OF URINE CREATININE: CPT

## 2025-07-01 PROCEDURE — 36415 COLL VENOUS BLD VENIPUNCTURE: CPT

## 2025-07-02 ENCOUNTER — MYC REFILL (OUTPATIENT)
Dept: INTERNAL MEDICINE | Facility: CLINIC | Age: 45
End: 2025-07-02

## 2025-07-02 ENCOUNTER — RESULTS FOLLOW-UP (OUTPATIENT)
Dept: ENDOCRINOLOGY | Facility: CLINIC | Age: 45
End: 2025-07-02

## 2025-07-02 ENCOUNTER — OFFICE VISIT (OUTPATIENT)
Dept: ENDOCRINOLOGY | Facility: CLINIC | Age: 45
End: 2025-07-02
Payer: COMMERCIAL

## 2025-07-02 VITALS
RESPIRATION RATE: 16 BRPM | SYSTOLIC BLOOD PRESSURE: 121 MMHG | HEIGHT: 70 IN | OXYGEN SATURATION: 96 % | WEIGHT: 228 LBS | HEART RATE: 123 BPM | TEMPERATURE: 97.9 F | BODY MASS INDEX: 32.64 KG/M2 | DIASTOLIC BLOOD PRESSURE: 82 MMHG

## 2025-07-02 DIAGNOSIS — E11.9 TYPE 2 DIABETES MELLITUS WITHOUT COMPLICATION, UNSPECIFIED WHETHER LONG TERM INSULIN USE (H): ICD-10-CM

## 2025-07-02 DIAGNOSIS — E11.65 TYPE 2 DIABETES MELLITUS WITH HYPERGLYCEMIA, WITH LONG-TERM CURRENT USE OF INSULIN (H): Primary | ICD-10-CM

## 2025-07-02 DIAGNOSIS — E66.01 MORBID OBESITY (H): ICD-10-CM

## 2025-07-02 DIAGNOSIS — Z79.4 TYPE 2 DIABETES MELLITUS WITH HYPERGLYCEMIA, WITH LONG-TERM CURRENT USE OF INSULIN (H): Primary | ICD-10-CM

## 2025-07-02 DIAGNOSIS — E11.65 TYPE 2 DIABETES MELLITUS WITH HYPERGLYCEMIA, UNSPECIFIED WHETHER LONG TERM INSULIN USE (H): ICD-10-CM

## 2025-07-02 PROCEDURE — 95251 CONT GLUC MNTR ANALYSIS I&R: CPT | Performed by: INTERNAL MEDICINE

## 2025-07-02 PROCEDURE — 3079F DIAST BP 80-89 MM HG: CPT | Performed by: INTERNAL MEDICINE

## 2025-07-02 PROCEDURE — 3074F SYST BP LT 130 MM HG: CPT | Performed by: INTERNAL MEDICINE

## 2025-07-02 PROCEDURE — 3044F HG A1C LEVEL LT 7.0%: CPT | Performed by: INTERNAL MEDICINE

## 2025-07-02 PROCEDURE — 99214 OFFICE O/P EST MOD 30 MIN: CPT | Performed by: INTERNAL MEDICINE

## 2025-07-02 PROCEDURE — 99207 PR FOOT EXAM NO CHARGE: CPT | Performed by: INTERNAL MEDICINE

## 2025-07-02 PROCEDURE — G2211 COMPLEX E/M VISIT ADD ON: HCPCS | Performed by: INTERNAL MEDICINE

## 2025-07-02 RX ORDER — INSULIN LISPRO 100 [IU]/ML
INJECTION, SOLUTION INTRAVENOUS; SUBCUTANEOUS
Qty: 45 ML | Refills: 1 | Status: SHIPPED | OUTPATIENT
Start: 2025-07-02

## 2025-07-02 RX ORDER — LISINOPRIL 10 MG/1
TABLET ORAL
Qty: 90 TABLET | Refills: 0 | Status: SHIPPED | OUTPATIENT
Start: 2025-07-02

## 2025-07-02 RX ORDER — ACYCLOVIR 400 MG/1
TABLET ORAL
Qty: 9 EACH | Refills: 3 | Status: SHIPPED | OUTPATIENT
Start: 2025-07-02

## 2025-07-02 RX ORDER — ASPIRIN 81 MG/1
81 TABLET ORAL DAILY
COMMUNITY

## 2025-07-02 RX ORDER — METFORMIN HYDROCHLORIDE 500 MG/1
2000 TABLET, EXTENDED RELEASE ORAL
Qty: 360 TABLET | Refills: 2 | Status: SHIPPED | OUTPATIENT
Start: 2025-07-02

## 2025-07-02 RX ORDER — SIMVASTATIN 20 MG
20 TABLET ORAL DAILY
Qty: 90 TABLET | Refills: 0 | OUTPATIENT
Start: 2025-07-02

## 2025-07-02 RX ORDER — INSULIN GLARGINE-YFGN 100 [IU]/ML
INJECTION, SOLUTION SUBCUTANEOUS
Qty: 30 ML | Refills: 3 | Status: SHIPPED | OUTPATIENT
Start: 2025-07-02

## 2025-07-02 NOTE — PATIENT INSTRUCTIONS
[facility]  Dr Salinas, Endocrinology Department    [facility]   [address]  [place] [address]  Appointment [phone]  Fax: [phone]  [place]: Monday - Thursday      Please check the cost coverage and copay with insurance before recommended tests, services and medications (especially if new medications are prescribed).     If ordered, please get blood work done 1 week prior to your next appointment so they will be available to Dr. Salinas at your visit.    To provide the best diabetic care, please bring your blood glucose meter to each and every visit with your  Endocrinologist. Your blood glucose CGM/meter/insulin pump will be downloaded at every appointment.  Please arrive 15 minutes before your scheduled appointment. This will allow for your blood glucose CGM/meter/insulin pump  to be downloaded.  If you are wearing DEXCOM please bring  or sharing code from the Dexcom Clarity Deborah so that it can be downloaded.  If you are using [employer] personal sensors please bring the reader.      Continue current DM regimen.   Metformin XR 2000 mg with dinner.  Moujaro 15 mg/week.  Jardiance 25 mg/day.  Semglee -- take 25 units in AM and 15 units at night. (Decrease at night)  Humalog: increase by 1-2 units with breakfast and lunch.   Continue to use Dexcom G7.  Labs and follow up in 6 months  Please make a lab appointment for blood work and follow up clinic appointment in 1 week after that to discuss results. ( Can be video visit)    Recommend checking blood sugars before meals and at bedtime.    If Blood glucose are low more often-> 2-3 times/week- give us a call.  Make better food choices: reduce carbs, Reduce portion size, weight loss and exercise 3-4 times a week.    What is hypoglycemia:  Hypoglycemia is when blood sugar levels become too low - below 70 m/dl.      What causes hypoglycemia?  - using too much insulin  -taking too  many diabetes pills  -not eating enough, or skipping meals or snacks  -not eating enough carbohydrate with meals  -changing your exercise routine  -drinking alcohol in excess    It is also possible to have hypoglycemia even when you are carefully managing your blood sugar levels.    What does it feel like when blood sugars get too low?  You may feel:  - anxious  -confused  -dizzy  -hungry  -light-headed  -nervous  -shaky  -sleepy  -sweaty    You may have  -blurred or cloudy vision  -heart palpitations (heart skips a beat or races)  -tingling or numbness around the mouth and tongue  -tremors    What to do if you have symptoms of hypoglycmemia:  If you think your blood sugar is too low, check it with a glucose meter.  If its below 70 mg/dl, consume one of the following:  Fruit juice (1/2 cup)  Glucose tablets (15 grams)  Hard candy (5 to 7 pieces)  Honey or sugar (2 teaspoons)  Milk (1/2 cup)  Soft drink (non-diet, 1/2 cup)    Wait 15 minutes and check your blood glucose again.  IF it is still below 70 mg/dl, have another food item listed above. Wait another 15 minutes and repeat the blood glucose test.  Have a small meal or snack that contains some carbohydrate after your blood glucose rises above 70 mg/dl.    If you are at risk of hypoglycemia, always carry with you glucose tablets or one of the foods listed above.      To prevent Hypoglycemia:  Avoid situations that may cause hypoglycemia  Before making any change to your diet or exercise routine, discuss them with your healthcare provider  Keep a record of your blood glucose levels.  Include the time of day, diabetes medications, when you had your last meal or snack, and what you were doing at the time (e.g. Watching TV, gardening, jogging, etc).    Talk to your healthcare provider if your blood glucose levels are often low        Patient guide on  hypoglycemia    [email or web address]/[email or web address]-[email or web address]

## 2025-07-02 NOTE — PROGRESS NOTES
ENDOCRINOLOGY CLINIC NOTE:  Name: Clarence Boyle  Seen for follow up of type 2 DM.  HPI:  Clarence Boyle is a 44 year old male who presents for the evaluation/management of Diabetes.   has a past medical history of Diabetes (H), Hypertension, and Obese.    Was seen by  before at [facility].  Was followed by CDE closely as well.  Available records, labs and images from outside clinic were personally reviewed.    Reports on and off low BG.  Appetite is OK.  Weight: mostly stable.    Has never been on insulin pump. He might be interested in future which is compatible with Dexcom G7. But currently he is OK with MDI.  He is not sure if insulin pump is covered by insurance.  He had GI s/e on Victoza.  Using DEXCOM G7.  In 2024 he started Mounjaro and currently on 15 mg/week. Tolerating ok.  Lost about 36 lbs so far.  Working closely with CDE.    He was considering Eversense. But not covered by insurance.    Wt Readings from Last 2 Encounters:   07/02/25 103.4 kg (228 lb)   05/02/25 105.3 kg (232 lb 3.2 oz)       1. Type 2 DM:  Orginally diagnosed at the age of: in 30s.  Current Regimen:   Metformin XR 2000 mg with dinner.  Moujaro 15 mg/week.  Jardiance 25 mg/day  Semglee 36 units once a day  Humalog 1:15 + 1u per 40 >140   (1/2 of correction at beditme)  (about 5-7 units TID)    yes:     Diabetes Medication(s)       Biguanides       metFORMIN (GLUCOPHAGE XR) 500 MG 24 hr tablet Take 4 tablets (2,000 mg) by mouth daily (with dinner).       Insulin       Insulin Glargine-yfgn (SEMGLEE, YFGN,) 100 UNIT/ML SOLN Inject 35 Units subcutaneously daily.     insulin lispro (HUMALOG KWIKPEN) 100 UNIT/ML (1 unit dial) KWIKPEN Inject 1 unit/15 gm of CHO + ssi 1:40> 140 up to 80 units daily       Sodium-Glucose Co-Transporter 2 (SGLT2) Inhibitors       JARDIANCE 25 MG TABS tablet TAKE 1 TABLET(25 MG) BY MOUTH DAILY       Incretin Mimetic Agents       Tirzepatide 15 MG/0.5ML SOAJ Inject 0.5 mLs (15 mg) subcutaneously every 7 days.           Victoza-- had GI s/e.    BS checks: DEXCOM  Average Meter Download: Blood glucose data reviewed personally. See nursing note from this encounter for details.  Last A1c: 7.2%  Symptoms of hypoglycemia (low blood sugar):  Gets symptoms of hypoglycemia.    DM Complications:   Complications:   Diabetes Complications  Description / Detail    Diabetic Retinopathy  No   CAD / PAD  No   Neuropathy  No   Nephropathy / Microalbuminuria  No   Gastroparesis  No   Hypoglycemia [place]  No       2. Hypertension:    on medications  3. Hyperlipidemia: on medications  Medications       HMG CoA Reductase Inhibitors     simvastatin (ZOCOR) 20 MG tablet       Salicylates     aspirin 81 MG EC tablet            PMH/PSH:  Past Medical History:   Diagnosis Date    Diabetes (H)     Hypertension     Obese      Past Surgical History:   Procedure Laterality Date    GENITOURINARY SURGERY      Stricture repair    URETHROPLASTY STAGE ONE WITH BUCCAL GRAFT N/A 05/02/2018    Procedure: URETHROPLASTY STAGE ONE WITH BUCCAL GRAFT;  Posterior Urethroplasty with Single Buccal Mucosa Graft;  Surgeon: Herb Awad MD;  Location: [facility] OR     Family Hx:  Family History   Problem Relation Age of Onset    Cancer Mother         Gynecologic    Thyroid Disease Mother     Colon Cancer Mother     Depression Mother     Obesity Mother     No Known Problems Father     Cancer Maternal Grandmother     Diabetes Paternal Grandmother     Mental Illness Paternal Grandmother     Colon Cancer Cousin         Approximately late 30s    Other Cancer Maternal Grandfather     Diabetes Other     Depression Sister        Diabetes: maternal aunt, p.grandmother.    Social Hx:  Social History     Socioeconomic History    Marital status:      Spouse name: Not on file    Number of children: Not on file    Years of education: Not on file    Highest education level: Not on file   Occupational History    Not on file   Tobacco Use    Smoking status: Never     Passive  exposure: Never    Smokeless tobacco: Never   Vaping Use    Vaping status: Never Used   Substance and Sexual Activity    Alcohol use: Yes     Comment: 2-3/month    Drug use: No    Sexual activity: Yes     Partners: Female     Birth control/protection: Pill   Other Topics Concern    Parent/sibling w/ CABG, MI or angioplasty before 65F 55M? No   Social History Narrative    Not on file     Social Drivers of Health     Financial Resource Strain: Low Risk  (4/29/2025)    Financial Resource Strain     Within the past 12 months, have you or your family members you live with been unable to get utilities (heat, electricity) when it was really needed?: No   Food Insecurity: Low Risk  (4/29/2025)    Food Insecurity     Within the past 12 months, did you worry that your food would run out before you got money to buy more?: No     Within the past 12 months, did the food you bought just not last and you didn t have money to get more?: No   Transportation Needs: Low Risk  (4/29/2025)    Transportation Needs     Within the past 12 months, has lack of transportation kept you from medical appointments, getting your medicines, non-medical meetings or appointments, work, or from getting things that you need?: No   Physical Activity: Insufficiently Active (4/29/2025)    Exercise Vital Sign     Days of Exercise per Week: 2 days     Minutes of Exercise per Session: 20 min   Stress: No Stress Concern Present (4/29/2025)    [employer] - Occupational Stress Questionnaire     Feeling of Stress : Only a little   Social Connections: Unknown (4/29/2025)    Social Connection and Isolation Panel [NHANES]     Frequency of Communication with Friends and Family: Not on file     Frequency of Social Gatherings with Friends and Family: Once a week     Attends L[employer]-day Services: Not on file     Active Member of Clubs or Organizations: Not on file     Attends Club or Organization Meetings: Not on file     Marital Status: Not  "on file   Interpersonal Safety: Low Risk  (5/2/2025)    Interpersonal Safety     Do you feel physically and emotionally safe where you currently live?: Yes     Within the past 12 months, have you been hit, slapped, kicked or otherwise physically hurt by someone?: No     Within the past 12 months, have you been humiliated or emotionally abused in other ways by your partner or ex-partner?: No   Housing Stability: Low Risk  (4/29/2025)    Housing Stability     Do you have housing? : Yes     Are you worried about losing your housing?: No          MEDICATIONS:  has a current medication list which includes the following prescription(s): aspirin, blood glucose, blood glucose monitoring, dexcom g7 sensor, insulin glargine-yfgn, insulin lispro, bd maty u/f, jardiance, lisinopril, loratadine, metformin, multiple vitamins-minerals, nystatin, simvastatin, and tirzepatide.    ROS     ROS: 10 point ROS neg other than the symptoms noted above in the HPI.    Physical Exam   VS: /82 (BP Location: Left arm, Patient Position: Chair, Cuff Size: Adult Large)   Pulse (!) 123   Temp 97.9  F (36.6  C) (Tympanic)   Resp 16   Ht 1.772 m (5' 9.76\")   Wt 103.4 kg (228 lb)   SpO2 96%   BMI 32.94 kg/m    GENERAL: healthy, alert and no distress  EYES: Eyes grossly normal to inspection, conjunctivae and sclerae normal  ENT: no nose swelling, nasal discharge.  Thyroid: no apparent thyroid nodules.  Thyroid appears normal in size and nontender.  CV: RRR, no rubs, gallops, no murmurs  RESP: CTAB, no wheezes, rales, or ronchi  ABDO: +BS  EXTREMITIES: no hand tremors.  NEURO: Cranial nerves grossly intact, mentation intact and speech normal  SKIN: No apparent skin lesions, rash or edema seen   PSYCH: mentation appears normal, affect normal/bright, judgement and insight intact, normal speech and appearance well-groomed  DM FOOT EXAM: normal DP and PT pulses, no trophic changes or ulcerative lesions, normal sensory exam and normal " monofilament exam.     LABS:  A1c:  Lab Results   Component Value Date    A1C 6.6 07/01/2025    A1C 6.9 12/06/2024    A1C 7.2 04/30/2024    A1C 7.0 12/12/2023    A1C 7.3 09/02/2023    A1C 8.4 04/28/2020     Creatinine:  Creatinine   Date Value Ref Range Status   07/01/2025 0.63 (L) 0.67 - 1.17 mg/dL Final   04/28/2020 0.65 (L) 0.66 - 1.25 mg/dL Final     Urine Micro:  Lab Results   Component Value Date    UMALCR  07/01/2025      Comment:      Unable to calculate, urine albumin and/or urine creatinine is outside detectable limits.  Microalbuminuria is defined as an albumin:creatinine ratio of 17 to 299 for males and 25 to 299 for females. A ratio of albumin:creatinine of 300 or higher is indicative of overt proteinuria.  Due to biologic variability, positive results should be confirmed by a second, first-morning random or 24-hour timed urine specimen. If there is discrepancy, a third specimen is recommended. When 2 out of 3 results are in the microalbuminuria range, this is evidence for incipient nephropathy and warrants increased efforts at glucose control, blood pressure control, and institution of therapy with an angiotensin-converting-enzyme (ACE) inhibitor (if the patient can tolerate it).      UMALCR  03/02/2022      Comment:      Unable to calculate:  Urine creatinine or albumin value below detectable level    UMALCR Unable to calculate due to low value 04/28/2020       LFTs/Lipids:  Recent Labs   Lab Test 05/02/25  0806 04/30/24  0800   CHOL 110 144   HDL 49 58   LDL 35 69   TRIG 131 87     TFTs:  TSH   Date Value Ref Range Status   05/02/2025 2.45 0.30 - 4.20 uIU/mL Final   03/02/2022 2.20 0.40 - 4.00 mU/L Final     All pertinent notes, labs, and images personally reviewed by me.     Glucometer/ insulin pump (if applicable)/ CGM data (if applicable) downloaded, Personally reviewed and interpreted.  All Blood sugar data reviewed personally and discussed with pt.      A/P  Mr.Ryan PHONG Boyle is a 45 year old here  for the evaluation/management of diabetes:    1. DM2 - Under fair control.  A1c 6.6%  No know complications.  BG high during day. Average  with TIR 57%.  No major episodes of hypoglycemia noted/reported.   Plan:  Discussed diagnosis, pathophysiology, management and treatment options of condition with pt.  I also discussed importance of strict blood sugar control to prevent complications associated with uncontrolled diabetes.  Continue current DM regimen.   Metformin XR 2000 mg with dinner.  Moujaro 15 mg/week.  Jardiance 25 mg/day.  Semglee -- take 25 units in AM and 15 units at night. (Decrease at night)  Humalog: increase by 1-2 units with breakfast and lunch. Continue current dose with dinner.  Continue to use Dexcom G7.  Labs and follow up in 6 months  Please make a lab appointment for blood work and follow up clinic appointment in 1 week after that to discuss results.    2. Hypertension - Under Good control.  On lisinopril.    3. Hyperlipidemia - On simvastatin 20 mg/day. LDL 59    4. Prevention:  Opthalmology- recommend annually  ASA-not indicated.  Smoking- No    Foot exam: 7/2/2025      Most Recent Immunizations   Administered Date(s) Administered    COVID-19 12+ (Pfizer) 11/19/2024    COVID-19 Bivalent 18+ (Moderna) 10/19/2022    COVID-19 MONOVALENT 12+ (Pfizer) 12/28/2021    COVID-19 Vaccine (Bouchra) 12/28/2021    Hepatitis A (Vaqta/Havrix)(Peds 12m-18y) 02/21/2000    Hepatitis B, Adult (Energix-B/Recombivax HB) 09/11/2013    Hepatitis B, Peds (Engerix-B/Recombivax HB) 04/12/1999    Influenza (IIV3) PF 09/11/2013    Influenza Vaccine >6 months,quad, PF 12/22/2022    Influenza Vaccine, 6+MO IM (QUADRIVALENT W/PRESERVATIVES) 11/26/2019    Influenza, Split Virus, Trivalent, Pf (Fluzone\Fluarix) 11/19/2024    Influenza,INJ,MDCK,PF,Quad >6mo(Flucelvax) 11/07/2023    MMR (MMRII) 08/05/1992    Pneumococcal 23 valent 11/17/2008    TDAP (Adacel,Boostrix) 04/26/2023    Td (Adult), Adsorbed 11/10/1995      Plan: CT FOOT EXAM NO CHARGE, insulin lispro (HUMALOG        KWIKPEN) 100 UNIT/ML (1 unit dial) KWIKPEN,         empagliflozin (JARDIANCE) 25 MG TABS tablet,         GLUCOSE MONITOR, 72 HOUR, PHYS INTERP,       Recommend checking blood sugars before meals and at bedtime.    If Blood glucose are low more often-> 2-3 times/week- give us a call.  The patient is advised to Make better food choices: reduce carbs, Reduce portion size, weight loss and exercise 3-4 times a week.  Discussed hypoglycemia signs and symptoms as well as management in detail.    There is some variability among people, most will usually develop symptoms suggestive of hypoglycemia when blood glucose levels are lowered to the mid 60's. The first set of symptoms are called adrenergic. Patients may experience any of the following nervousness, sweating, intense hunger, trembling, weakness, palpitations, and difficulty speaking.   The acute management of hypoglycemia involves the rapid delivery of a source of easily absorbed sugar. Regular soda, juice, lifesavers, table sugar, are good options. 15 grams of glucose is the dose that is given, followed by an assessment of symptoms and a blood glucose check if possible. If after 10 minutes there is no improvement, another 10-15 grams should be given. This can be repeated up to three times. The equivalency of 10-15 grams of glucose (approximate servings) are: Four lifesavers, 4 teaspoons of sugar, or 1/2 can of regular soda or juice.     Discussed indications, risks and benefits of all medications prescribed, and answered questions to patient's satisfaction.  The longitudinal plan of care for the diagnosis(es)/condition(s) as documented were addressed during this visit. Due to the added complexity in care, I will continue to support Clarence in the subsequent management and with ongoing continuity of care.  All questions were answered.  The patient indicates understanding of the above issues and agrees with  the plan set forth.     Follow-up:  As noted in AVS    Nayana Salinas M.D  [facility]  CC: Damien Contreras    Disclaimer: This note consists of symbols derived from keyboarding, dictation and/or voice recognition software. As a result, there may be errors in the script that have gone undetected. Please consider this when interpreting information found in this chart.    Addendum to above note and clinic visit:    Labs reviewed.    See result note/telephone encounter.

## 2025-07-02 NOTE — LETTER
7/2/2025      Clarence Boyle  [address]  [facility] [address]      Dear Colleague,    Thank you for referring your patient, Clarence Boyle, to the [facility]. Please see a copy of my visit note below.    ENDOCRINOLOGY CLINIC NOTE:  Name: Clarence Boyle  Seen for follow up of type 2 DM.  HPI:  Clarence Boyle is a 44 year old male who presents for the evaluation/management of Diabetes.   has a past medical history of Diabetes (H), Hypertension, and Obese.    Was seen by  before at [facility].  Was followed by CDE closely as well.  Available records, labs and images from outside clinic were personally reviewed.    Reports on and off low BG.  Appetite is OK.  Weight: mostly stable.    Has never been on insulin pump. He might be interested in future which is compatible with Dexcom G7. But currently he is OK with MDI.  He is not sure if insulin pump is covered by insurance.  He had GI s/e on Victoza.  Using DEXCOM G7.  In 2024 he started Mounjaro and currently on 15 mg/week. Tolerating ok.  Lost about 36 lbs so far.  Working closely with CDE.    He was considering Eversense. But not covered by insurance.    Wt Readings from Last 2 Encounters:   07/02/25 103.4 kg (228 lb)   05/02/25 105.3 kg (232 lb 3.2 oz)       1. Type 2 DM:  Orginally diagnosed at the age of: in 30s.  Current Regimen:   Metformin XR 2000 mg with dinner.  Moujaro 15 mg/week.  Jardiance 25 mg/day  Semglee 36 units once a day  Humalog 1:15 + 1u per 40 >140   (1/2 of correction at beditme)  (about 5-7 units TID)    yes:     Diabetes Medication(s)       Biguanides       metFORMIN (GLUCOPHAGE XR) 500 MG 24 hr tablet Take 4 tablets (2,000 mg) by mouth daily (with dinner).       Insulin       Insulin Glargine-yfgn (SEMGLEE, YFGN,) 100 UNIT/ML SOLN Inject 35 Units subcutaneously daily.     insulin lispro (HUMALOG KWIKPEN) 100 UNIT/ML (1 unit dial) KWIKPEN Inject 1 unit/15 gm of CHO + ssi 1:40> 140 up to 80 units daily       Sodium-Glucose  Co-Transporter 2 (SGLT2) Inhibitors       JARDIANCE 25 MG TABS tablet TAKE 1 TABLET(25 MG) BY MOUTH DAILY       Incretin Mimetic Agents       Tirzepatide 15 MG/0.5ML SOAJ Inject 0.5 mLs (15 mg) subcutaneously every 7 days.          Victoza-- had GI s/e.    BS checks: DEXCOM  Average Meter Download: Blood glucose data reviewed personally. See nursing note from this encounter for details.  Last A1c: 7.2%  Symptoms of hypoglycemia (low blood sugar):  Gets symptoms of hypoglycemia.    DM Complications:   Complications:   Diabetes Complications  Description / Detail    Diabetic Retinopathy  No   CAD / PAD  No   Neuropathy  No   Nephropathy / Microalbuminuria  No   Gastroparesis  No   Hypoglycemia [place]arness  No       2. Hypertension:    on medications  3. Hyperlipidemia: on medications  Medications       HMG CoA Reductase Inhibitors     simvastatin (ZOCOR) 20 MG tablet       Salicylates     aspirin 81 MG EC tablet            PMH/PSH:  Past Medical History:   Diagnosis Date     Diabetes (H)      Hypertension      Obese      Past Surgical History:   Procedure Laterality Date     GENITOURINARY SURGERY      Stricture repair     URETHROPLASTY STAGE ONE WITH BUCCAL GRAFT N/A 05/02/2018    Procedure: URETHROPLASTY STAGE ONE WITH BUCCAL GRAFT;  Posterior Urethroplasty with Single Buccal Mucosa Graft;  Surgeon: Herb Awad MD;  Location: [facility] OR     Family Hx:  Family History   Problem Relation Age of Onset     Cancer Mother         Gynecologic     Thyroid Disease Mother      Colon Cancer Mother      Depression Mother      Obesity Mother      No Known Problems Father      Cancer Maternal Grandmother      Diabetes Paternal Grandmother      Mental Illness Paternal Grandmother      Colon Cancer Cousin         Approximately late 30s     Other Cancer Maternal Grandfather      Diabetes Other      Depression Sister        Diabetes: maternal aunt, p.grandmother.    Social Hx:  Social History     Socioeconomic History     Marital  status:      Spouse name: Not on file     Number of children: Not on file     Years of education: Not on file     Highest education level: Not on file   Occupational History     Not on file   Tobacco Use     Smoking status: Never     Passive exposure: Never     Smokeless tobacco: Never   Vaping Use     Vaping status: Never Used   Substance and Sexual Activity     Alcohol use: Yes     Comment: 2-3/month     Drug use: No     Sexual activity: Yes     Partners: Female     Birth control/protection: Pill   Other Topics Concern     Parent/sibling w/ CABG, MI or angioplasty before 65F 55M? No   Social History Narrative     Not on file     Social Drivers of Health     Financial Resource Strain: Low Risk  (4/29/2025)    Financial Resource Strain      Within the past 12 months, have you or your family members you live with been unable to get utilities (heat, electricity) when it was really needed?: No   Food Insecurity: Low Risk  (4/29/2025)    Food Insecurity      Within the past 12 months, did you worry that your food would run out before you got money to buy more?: No      Within the past 12 months, did the food you bought just not last and you didn t have money to get more?: No   Transportation Needs: Low Risk  (4/29/2025)    Transportation Needs      Within the past 12 months, has lack of transportation kept you from medical appointments, getting your medicines, non-medical meetings or appointments, work, or from getting things that you need?: No   Physical Activity: Insufficiently Active (4/29/2025)    Exercise Vital Sign      Days of Exercise per Week: 2 days      Minutes of Exercise per Session: 20 min   Stress: No Stress Concern Present (4/29/2025)    [employer] [place] [employer] - Occupational Stress Questionnaire      Feeling of Stress : Only a little   Social Connections: Unknown (4/29/2025)    Social Connection and Isolation Panel [NHANES]      Frequency of Communication with Friends and Family:  "Not on file      Frequency of Social Gatherings with Friends and Family: Once a week      Attends Gnosticism Services: Not on file      Active Member of Clubs or Organizations: Not on file      Attends Club or Organization Meetings: Not on file      Marital Status: Not on file   Interpersonal Safety: Low Risk  (5/2/2025)    Interpersonal Safety      Do you feel physically and emotionally safe where you currently live?: Yes      Within the past 12 months, have you been hit, slapped, kicked or otherwise physically hurt by someone?: No      Within the past 12 months, have you been humiliated or emotionally abused in other ways by your partner or ex-partner?: No   Housing Stability: Low Risk  (4/29/2025)    Housing Stability      Do you have housing? : Yes      Are you worried about losing your housing?: No          MEDICATIONS:  has a current medication list which includes the following prescription(s): aspirin, blood glucose, blood glucose monitoring, dexcom g7 sensor, insulin glargine-yfgn, insulin lispro, bd maty u/f, jardiance, lisinopril, loratadine, metformin, multiple vitamins-minerals, nystatin, simvastatin, and tirzepatide.    ROS     ROS: 10 point ROS neg other than the symptoms noted above in the HPI.    Physical Exam   VS: /82 (BP Location: Left arm, Patient Position: Chair, Cuff Size: Adult Large)   Pulse (!) 123   Temp 97.9  F (36.6  C) (Tympanic)   Resp 16   Ht 1.772 m (5' 9.76\")   Wt 103.4 kg (228 lb)   SpO2 96%   BMI 32.94 kg/m    GENERAL: healthy, alert and no distress  EYES: Eyes grossly normal to inspection, conjunctivae and sclerae normal  ENT: no nose swelling, nasal discharge.  Thyroid: no apparent thyroid nodules.  Thyroid appears normal in size and nontender.  CV: RRR, no rubs, gallops, no murmurs  RESP: CTAB, no wheezes, rales, or ronchi  ABDO: +BS  EXTREMITIES: no hand tremors.  NEURO: Cranial nerves grossly intact, mentation intact and speech normal  SKIN: No apparent skin " lesions, rash or edema seen   PSYCH: mentation appears normal, affect normal/bright, judgement and insight intact, normal speech and appearance well-groomed  DM FOOT EXAM: normal DP and PT pulses, no trophic changes or ulcerative lesions, normal sensory exam and normal monofilament exam.     LABS:  A1c:  Lab Results   Component Value Date    A1C 6.6 07/01/2025    A1C 6.9 12/06/2024    A1C 7.2 04/30/2024    A1C 7.0 12/12/2023    A1C 7.3 09/02/2023    A1C 8.4 04/28/2020     Creatinine:  Creatinine   Date Value Ref Range Status   07/01/2025 0.63 (L) 0.67 - 1.17 mg/dL Final   04/28/2020 0.65 (L) 0.66 - 1.25 mg/dL Final     Urine Micro:  Lab Results   Component Value Date    UMALCR  07/01/2025      Comment:      Unable to calculate, urine albumin and/or urine creatinine is outside detectable limits.  Microalbuminuria is defined as an albumin:creatinine ratio of 17 to 299 for males and 25 to 299 for females. A ratio of albumin:creatinine of 300 or higher is indicative of overt proteinuria.  Due to biologic variability, positive results should be confirmed by a second, first-morning random or 24-hour timed urine specimen. If there is discrepancy, a third specimen is recommended. When 2 out of 3 results are in the microalbuminuria range, this is evidence for incipient nephropathy and warrants increased efforts at glucose control, blood pressure control, and institution of therapy with an angiotensin-converting-enzyme (ACE) inhibitor (if the patient can tolerate it).      UMALCR  03/02/2022      Comment:      Unable to calculate:  Urine creatinine or albumin value below detectable level    UMALCR Unable to calculate due to low value 04/28/2020       LFTs/Lipids:  Recent Labs   Lab Test 05/02/25  0806 04/30/24  0800   CHOL 110 144   HDL 49 58   LDL 35 69   TRIG 131 87     TFTs:  TSH   Date Value Ref Range Status   05/02/2025 2.45 0.30 - 4.20 uIU/mL Final   03/02/2022 2.20 0.40 - 4.00 mU/L Final     All pertinent notes, labs,  and images personally reviewed by me.     Glucometer/ insulin pump (if applicable)/ CGM data (if applicable) downloaded, Personally reviewed and interpreted.  All Blood sugar data reviewed personally and discussed with pt.      A/P  Mr.Ryan PHONG Boyle is a 45 year old here for the evaluation/management of diabetes:    1. DM2 - Under fair control.  A1c 6.6%  No know complications.  BG high during day. Average  with TIR 57%.  No major episodes of hypoglycemia noted/reported.   Plan:  Discussed diagnosis, pathophysiology, management and treatment options of condition with pt.  I also discussed importance of strict blood sugar control to prevent complications associated with uncontrolled diabetes.  Continue current DM regimen.   Metformin XR 2000 mg with dinner.  Moujaro 15 mg/week.  Jardiance 25 mg/day.  Semglee -- take 25 units in AM and 15 units at night. (Decrease at night)  Humalog: increase by 1-2 units with breakfast and lunch. Continue current dose with dinner.  Continue to use Dexcom G7.  Labs and follow up in 6 months  Please make a lab appointment for blood work and follow up clinic appointment in 1 week after that to discuss results.    2. Hypertension - Under Good control.  On lisinopril.    3. Hyperlipidemia - On simvastatin 20 mg/day. LDL 59    4. Prevention:  Opthalmology- recommend annually  ASA-not indicated.  Smoking- No    Foot exam: 7/2/2025      Most Recent Immunizations   Administered Date(s) Administered     COVID-19 12+ (Pfizer) 11/19/2024     COVID-19 Bivalent 18+ (Moderna) 10/19/2022     COVID-19 MONOVALENT 12+ (Pfizer) 12/28/2021     COVID-19 Vaccine (Bouchra) 12/28/2021     Hepatitis A (Vaqta/Havrix)(Peds 12m-18y) 02/21/2000     Hepatitis B, Adult (Energix-B/Recombivax HB) 09/11/2013     Hepatitis B, Peds (Engerix-B/Recombivax HB) 04/12/1999     Influenza (IIV3) PF 09/11/2013     Influenza Vaccine >6 months,quad, PF 12/22/2022     Influenza Vaccine, 6+MO IM (QUADRIVALENT W/PRESERVATIVES)  11/26/2019     Influenza, Split Virus, Trivalent, Pf (Fluzone\Fluarix) 11/19/2024     Influenza,INJ,MDCK,PF,Quad >6mo(Flucelvax) 11/07/2023     MMR (MMRII) 08/05/1992     Pneumococcal 23 valent 11/17/2008     TDAP (Adacel,Boostrix) 04/26/2023     Td (Adult), Adsorbed 11/10/1995     Plan: HI FOOT EXAM NO CHARGE, insulin lispro (HUMALOG        KWIKPEN) 100 UNIT/ML (1 unit dial) KWIKPEN,         empagliflozin (JARDIANCE) 25 MG TABS tablet,         GLUCOSE MONITOR, 72 HOUR, PHYS INTERP,       Recommend checking blood sugars before meals and at bedtime.    If Blood glucose are low more often-> 2-3 times/week- give us a call.  The patient is advised to Make better food choices: reduce carbs, Reduce portion size, weight loss and exercise 3-4 times a week.  Discussed hypoglycemia signs and symptoms as well as management in detail.    There is some variability among people, most will usually develop symptoms suggestive of hypoglycemia when blood glucose levels are lowered to the mid 60's. The first set of symptoms are called adrenergic. Patients may experience any of the following nervousness, sweating, intense hunger, trembling, weakness, palpitations, and difficulty speaking.   The acute management of hypoglycemia involves the rapid delivery of a source of easily absorbed sugar. Regular soda, juice, lifesavers, table sugar, are good options. 15 grams of glucose is the dose that is given, followed by an assessment of symptoms and a blood glucose check if possible. If after 10 minutes there is no improvement, another 10-15 grams should be given. This can be repeated up to three times. The equivalency of 10-15 grams of glucose (approximate servings) are: Four lifesavers, 4 teaspoons of sugar, or 1/2 can of regular soda or juice.     Discussed indications, risks and benefits of all medications prescribed, and answered questions to patient's satisfaction.  The longitudinal plan of care for the diagnosis(es)/condition(s) as  documented were addressed during this visit. Due to the added complexity in care, I will continue to support Clarence in the subsequent management and with ongoing continuity of care.  All questions were answered.  The patient indicates understanding of the above issues and agrees with the plan set forth.     Follow-up:  As noted in AVS    Nayana Salinas M.D  Endocrinology  [facility]  CC: Damien Contreras    Disclaimer: This note consists of symbols derived from keyboarding, dictation and/or voice recognition software. As a result, there may be errors in the script that have gone undetected. Please consider this when interpreting information found in this chart.    Addendum to above note and clinic visit:    Labs reviewed.    See result note/telephone encounter.                    Again, thank you for allowing me to participate in the care of your patient.        Sincerely,        Nayana Salinas MD    Electronically signed

## 2025-07-02 NOTE — NURSING NOTE
"ENDOCRINOLOGY INTAKE FORM    Patient Name:  Clarence Boyle  :  1980    Is patient Diabetic?   Yes: type 2  Does patient have non-diabetic or other endocrine issues?  Yes: hyperlipidemia, obesity    Vitals: There were no vitals taken for this visit.  BMI= There is no height or weight on file to calculate BMI.    Flu vaccine:  Yes:   Pneumonia vaccine:  Yes: 23 x 2    Smoking and Alcohol use:  Social History     Tobacco Use    Smoking status: Never     Passive exposure: Never    Smokeless tobacco: Never   Vaping Use    Vaping status: Never Used   Substance Use Topics    Alcohol use: Yes     Comment: 2-3/month    Drug use: No       Foot Exam: No  Eye Exam:  No  Dental Exam:    Aspirin Use:  No    Lab Results   Component Value Date    A1C 6.6 2025    A1C 6.9 2024    A1C 7.2 2024    A1C 7.0 2023    A1C 7.3 2023    A1C 8.4 2020       Lab Results   Component Value Date    MICROL <12.0 2025    MICROL <5 2022    MICROL <5 2020     No results found for: \"MICROALBUMIN\"    Rosie Meier [facility] Endocrinology [place]  [phone]    "

## 2025-07-14 ENCOUNTER — MYC REFILL (OUTPATIENT)
Dept: ENDOCRINOLOGY | Facility: CLINIC | Age: 45
End: 2025-07-14
Payer: COMMERCIAL

## 2025-07-14 DIAGNOSIS — E11.65 TYPE 2 DIABETES MELLITUS WITH HYPERGLYCEMIA, UNSPECIFIED WHETHER LONG TERM INSULIN USE (H): ICD-10-CM

## 2025-07-14 RX ORDER — INSULIN GLARGINE-YFGN 100 [IU]/ML
INJECTION, SOLUTION SUBCUTANEOUS
Qty: 30 ML | Refills: 3 | Status: CANCELLED | OUTPATIENT
Start: 2025-07-14

## 2025-07-25 ENCOUNTER — TRANSFERRED RECORDS (OUTPATIENT)
Dept: ADMINISTRATIVE | Facility: CLINIC | Age: 45
End: 2025-07-25
Payer: COMMERCIAL

## 2025-07-28 PROBLEM — D12.6 TUBULAR ADENOMA OF COLON: Status: ACTIVE | Noted: 2025-07-28

## 2025-07-28 NOTE — PROCEDURES
[facility]   [address], [address], [place] [address]     Patient Name: Clarence Boyle  Gender:  Male  Exam Date: 07/25/2025 Visit Number:  [id number]  Age: 45 Years YOB: 1980  Attending MD: Pipo Chavez MD Medical Record#:  [id number]    Procedure: Colonoscopy   Indications: Colorectal cancer screening      Referring MD: Damien Contreras MD  Primary MD:      Damien Contreras MD  Medications: Admitting Medications:   0.9% Normal Saline at TKO   Intra Procedure Medications:   Patient received monitored anesthesia care.     Complications: No immediate complications  ______________________________________________________________________________  Procedure:   An examination of the heart and lungs was performed and found to be within acceptable limits.  .  The patient was therefore deemed a reasonable candidate for endoscopy and sedation.   The risks and benefits of the procedure were explained to the patient.After obtaining informed consent, the patient received monitored anesthesia care and I passed the scope   without difficulty via the rectum to the cecum.  The appendiceal orifice and ic valve were identified.  The scope was retroflexed during the examination  The quality of the prep was good  (Miralax/Gatorade/2 tablets Bisacodyl/Magnesium Citrate).    This was a complete examination throughout the entire colon.    Findings:    Polyp location: Cecum/ascending colon.  Quantity: 3.  Size:  3 mm, 5 mm, 10 mm.  Polyp shape:  sessile.         Maneuver: polypectomy was performed with a cold snare.       Removal:  complete.  Retrieval: complete.  Bleeding: none.    Polyp location: sigmoid.  Quantity: 3.  Size:  3 mm, 3 mm, 3 mm.  Polyp shape: sessile.         Maneuver: polypectomy was performed with a  cold snare.       Removal: complete.  Retrieval: incomplete-2out of 3.  Bleeding: none.    Hemorrhoids.  Internal hemorrhoids without bleeding.  Remainder of the exam is  normal.    Impression:  Benign neoplasm of ascending colon  Benign neoplasm of sigmoid colon  Polyp of cecum    Preliminary Plan:  The patient and their physician will receive a copy of the pathology report as well as pathology-based recommendations for future screening or surveillance.  Repeat colonoscopy in 3 years for colon cancer screening  Pathology Results:  A: COLON, CECUM AND ASCENDING, POLYPS:           1. Sessile serrated adenomas (3)           2. Negative for overt dysplasia           3. Per the colonoscopy report:               a. Polyp sizes: 3 mm, 5 mm and 10 mm               b. Resection: Complete               c. Retrieval: Complete      B: COLON, SIGMOID, POLYPS:           1. Tubular adenoma (1) and hyperplastic polyp (1); two tissue fragments received           2. Negative for high grade dysplasia           3. Per the colonoscopy report:               a. Polyp sizes: 3 mm               b. Resection: Complete               c. Retrieval: Complete      MICROSCOPIC  A: Performed   B: Performed     Electronically signed by: Erasmo Gan MD    Interpreted at [facility], [address] [place] [id number]    Final Plan:  Repeat colonoscopy in 3 years for Colon cancer screening and Polyp surveillance.    We will attempt to contact you at appropriate intervals via [place] mail.  We may not be able to find you or contact you at that time, therefore you should know that the responsibility for following our recommendation rests with you.  If you don't hear from us at the time your procedure is due, please contact our office to schedule an appointment.  If your contact information should change, please contact our office so that we can update your record.      _Electronically signed by:___________________  Pipo Chavez MD                 07/25/2025    cc: Damien Contreras MD  cc: Damien Contreras MD

## 2025-07-29 ENCOUNTER — PATIENT OUTREACH (OUTPATIENT)
Dept: GASTROENTEROLOGY | Facility: CLINIC | Age: 45
End: 2025-07-29
Payer: COMMERCIAL

## 2025-09-04 DIAGNOSIS — E66.01 MORBID OBESITY (H): ICD-10-CM

## 2025-09-04 DIAGNOSIS — E11.9 TYPE 2 DIABETES MELLITUS WITHOUT COMPLICATION, UNSPECIFIED WHETHER LONG TERM INSULIN USE (H): ICD-10-CM

## 2025-09-04 RX ORDER — SIMVASTATIN 20 MG
20 TABLET ORAL DAILY
Qty: 90 TABLET | Refills: 1 | Status: SHIPPED | OUTPATIENT
Start: 2025-09-04

## (undated) DEVICE — Device

## (undated) DEVICE — SU MONOCRYL 4-0 PS-2 27" UND Y426H

## (undated) DEVICE — PACK ENT MINOR CUSTOM ASC

## (undated) DEVICE — PREP CHLORAPREP 26ML TINTED ORANGE  260815

## (undated) DEVICE — LABEL MEDICATION SYSTEM 3303-P

## (undated) DEVICE — SU CHROMIC 4-0 SH 27" G121H

## (undated) DEVICE — PAD CHUX UNDERPAD 30X30"

## (undated) DEVICE — KIT ENDO FIRST STEP DISINFECTANT 200ML W/POUCH EP-4

## (undated) DEVICE — TUBING IRRIG CYSTO/BLADDER SET 81" LF 2C4040

## (undated) DEVICE — SYR BULB IRRIG 50ML LATEX FREE 0035280

## (undated) DEVICE — BLADE KNIFE SURG 15 371115

## (undated) DEVICE — NDL COUNTER 20CT 31142493

## (undated) DEVICE — PANTIES MESH LG/XLG 2PK 706M2

## (undated) DEVICE — SU ETHILON 3-0 FS-1 18" 663G

## (undated) DEVICE — SOL NACL 0.9% INJ 1000ML BAG 2B1324X

## (undated) DEVICE — SOL WATER IRRIG 1000ML BOTTLE 2F7114

## (undated) DEVICE — SU MONOCRYL 4-0 RB-1 27" Y214H

## (undated) DEVICE — SUCTION MANIFOLD NEPTUNE 2 SYS 4 PORT 0702-020-000

## (undated) DEVICE — GLOVE PROTEXIS W/NEU-THERA 7.5  2D73TE75

## (undated) DEVICE — CATH FOLYSIL 16FR 15ML AA6116

## (undated) DEVICE — VESSEL LOOPS BLUE SUPERMAXI 011022PBX

## (undated) DEVICE — PREP POVIDONE IODINE SOLUTION 10% 120ML

## (undated) DEVICE — SU SILK 2-0 SH CR 5X18" C0125

## (undated) DEVICE — DRAPE GYN/UROLOGY FLUID POUCH TUR 29455

## (undated) DEVICE — BLADE CLIPPER SGL USE 9680

## (undated) DEVICE — JELLY LUBRICATING SURGILUBE 2OZ TUBE

## (undated) DEVICE — SOL NACL 0.9% IRRIG 1000ML BOTTLE 2F7124

## (undated) DEVICE — PREP SKIN SCRUB TRAY 4461A

## (undated) DEVICE — SUTURE BOOTS 051003PBX

## (undated) DEVICE — CATH PLUG W/CAP 000076

## (undated) DEVICE — LINEN GOWN XLG 5407

## (undated) DEVICE — SYR 50ML LL W/O NDL 309653

## (undated) DEVICE — GLOVE PROTEXIS W/NEU-THERA 8.0  2D73TE80

## (undated) DEVICE — ENDO SEAL BX PORT BPS-A

## (undated) DEVICE — TUBING SUCTION 12"X1/4" N612

## (undated) DEVICE — SU PDS II 5-0 RB-1 DA 30" Z320H

## (undated) DEVICE — ESU CORD BIPOLAR GREEN 10-4000

## (undated) DEVICE — LINEN TOWEL PACK X5 5464

## (undated) DEVICE — CATH URETERAL OPEN END 05FR 70CM 020015

## (undated) DEVICE — TOOTHBRUSH ADULT NON STERILE MDS136850

## (undated) DEVICE — SU VICRYL 3-0 SH 27" UND J416H

## (undated) DEVICE — SU VICRYL 4-0 RB-1 27" UND J214H

## (undated) DEVICE — SPONGE RAY-TEC 4X8" 7318

## (undated) DEVICE — DRAPE BACK TABLE  44X90" 8377

## (undated) DEVICE — DRAPE POUCH INSTRUMENT 1018

## (undated) DEVICE — LINEN TOWEL PACK X6 WHITE 5487

## (undated) DEVICE — DRSG GAUZE 4X4" TRAY 6939

## (undated) DEVICE — DRAPE STERI TOWEL LG 1010

## (undated) DEVICE — ESU GROUND PAD ADULT W/CORD E7507

## (undated) DEVICE — DRAPE LEGGINGS CLEAR 8430

## (undated) DEVICE — CONNECTOR WATER VALVE PERFUSION PACK STR 020272801

## (undated) DEVICE — NDL 25GA 5/8" 305122

## (undated) RX ORDER — ONDANSETRON 2 MG/ML
INJECTION INTRAMUSCULAR; INTRAVENOUS
Status: DISPENSED
Start: 2018-05-02

## (undated) RX ORDER — PROPOFOL 10 MG/ML
INJECTION, EMULSION INTRAVENOUS
Status: DISPENSED
Start: 2018-05-02

## (undated) RX ORDER — CHLORHEXIDINE GLUCONATE ORAL RINSE 1.2 MG/ML
SOLUTION DENTAL
Status: DISPENSED
Start: 2018-05-02

## (undated) RX ORDER — BUPIVACAINE HYDROCHLORIDE 2.5 MG/ML
INJECTION, SOLUTION EPIDURAL; INFILTRATION; INTRACAUDAL
Status: DISPENSED
Start: 2018-05-02

## (undated) RX ORDER — BUPIVACAINE HYDROCHLORIDE 5 MG/ML
INJECTION, SOLUTION EPIDURAL; INTRACAUDAL
Status: DISPENSED
Start: 2018-05-02

## (undated) RX ORDER — KETOROLAC TROMETHAMINE 30 MG/ML
INJECTION, SOLUTION INTRAMUSCULAR; INTRAVENOUS
Status: DISPENSED
Start: 2018-05-02

## (undated) RX ORDER — LIDOCAINE HYDROCHLORIDE 20 MG/ML
INJECTION, SOLUTION EPIDURAL; INFILTRATION; INTRACAUDAL; PERINEURAL
Status: DISPENSED
Start: 2018-05-02

## (undated) RX ORDER — FENTANYL CITRATE 50 UG/ML
INJECTION, SOLUTION INTRAMUSCULAR; INTRAVENOUS
Status: DISPENSED
Start: 2018-05-02

## (undated) RX ORDER — HYDROMORPHONE HYDROCHLORIDE 1 MG/ML
INJECTION, SOLUTION INTRAMUSCULAR; INTRAVENOUS; SUBCUTANEOUS
Status: DISPENSED
Start: 2018-05-02

## (undated) RX ORDER — ACETAMINOPHEN 325 MG/1
TABLET ORAL
Status: DISPENSED
Start: 2018-05-02

## (undated) RX ORDER — GABAPENTIN 300 MG/1
CAPSULE ORAL
Status: DISPENSED
Start: 2018-05-02

## (undated) RX ORDER — EPINEPHRINE 1 MG/ML
INJECTION, SOLUTION, CONCENTRATE INTRAVENOUS
Status: DISPENSED
Start: 2018-05-02

## (undated) RX ORDER — DEXAMETHASONE SODIUM PHOSPHATE 4 MG/ML
INJECTION, SOLUTION INTRA-ARTICULAR; INTRALESIONAL; INTRAMUSCULAR; INTRAVENOUS; SOFT TISSUE
Status: DISPENSED
Start: 2018-05-02

## (undated) RX ORDER — EPHEDRINE SULFATE 50 MG/ML
INJECTION, SOLUTION INTRAMUSCULAR; INTRAVENOUS; SUBCUTANEOUS
Status: DISPENSED
Start: 2018-05-02

## (undated) RX ORDER — CIPROFLOXACIN 2 MG/ML
INJECTION, SOLUTION INTRAVENOUS
Status: DISPENSED
Start: 2018-05-02

## (undated) RX ORDER — OXYCODONE HYDROCHLORIDE 5 MG/1
TABLET ORAL
Status: DISPENSED
Start: 2018-05-02

## (undated) RX ORDER — VANCOMYCIN HYDROCHLORIDE 500 MG/10ML
INJECTION, POWDER, LYOPHILIZED, FOR SOLUTION INTRAVENOUS
Status: DISPENSED
Start: 2018-05-02